# Patient Record
Sex: MALE | Race: WHITE | NOT HISPANIC OR LATINO | Employment: OTHER | URBAN - METROPOLITAN AREA
[De-identification: names, ages, dates, MRNs, and addresses within clinical notes are randomized per-mention and may not be internally consistent; named-entity substitution may affect disease eponyms.]

---

## 2017-01-24 ENCOUNTER — OFFICE VISIT (OUTPATIENT)
Dept: ORTHOPEDICS | Facility: CLINIC | Age: 82
End: 2017-01-24
Payer: COMMERCIAL

## 2017-01-24 ENCOUNTER — RADIANT APPOINTMENT (OUTPATIENT)
Dept: GENERAL RADIOLOGY | Facility: CLINIC | Age: 82
End: 2017-01-24
Attending: ORTHOPAEDIC SURGERY
Payer: COMMERCIAL

## 2017-01-24 VITALS — HEIGHT: 68 IN | WEIGHT: 170 LBS | BODY MASS INDEX: 25.76 KG/M2 | RESPIRATION RATE: 18 BRPM

## 2017-01-24 DIAGNOSIS — M17.0 OSTEOARTHRITIS OF BOTH KNEES, UNSPECIFIED OSTEOARTHRITIS TYPE: Primary | ICD-10-CM

## 2017-01-24 DIAGNOSIS — M17.0 PRIMARY OSTEOARTHRITIS OF BOTH KNEES: ICD-10-CM

## 2017-01-24 DIAGNOSIS — M17.0 OSTEOARTHRITIS OF BOTH KNEES, UNSPECIFIED OSTEOARTHRITIS TYPE: ICD-10-CM

## 2017-01-24 PROCEDURE — 20610 DRAIN/INJ JOINT/BURSA W/O US: CPT | Mod: 50 | Performed by: ORTHOPAEDIC SURGERY

## 2017-01-24 PROCEDURE — 73562 X-RAY EXAM OF KNEE 3: CPT | Mod: RT

## 2017-01-24 PROCEDURE — 99203 OFFICE O/P NEW LOW 30 MIN: CPT | Mod: 25 | Performed by: ORTHOPAEDIC SURGERY

## 2017-01-24 NOTE — PROGRESS NOTES
The patient's left and right knee was prepped with betadine solution after verification of allergies. Area approximately 10 cm x 10 cm prepped in a sterile fashion. After injection, betadine removed with soap and water and band-aids applied.    1ml depo-medrol with 1% lidocaine plain injected into patient's left and right knee by Dr. Juliano Childers  LOT# [id number]  Exp. 05/2019

## 2017-01-24 NOTE — PATIENT INSTRUCTIONS
You have had a steroid injection today.  For the first 2 hours there will likely be some numbing in the joint from the lidocaine.  This is a good sign, indicating that the injection is in the right place.  In 2 hours the lidocaine will wear off, and the joint will hurt like you had a shot.  Each day the cortisone makes it feel better.  It reaches peak effect in 2 weeks.  We expect it to last for 3 months.  You may resume regular activity when you feel ready.  If you are diabetic, your glucoses will be quite high for several days.

## 2017-01-25 RX ORDER — METHYLPREDNISOLONE ACETATE 80 MG/ML
160 INJECTION, SUSPENSION INTRA-ARTICULAR; INTRALESIONAL; INTRAMUSCULAR; SOFT TISSUE ONCE
Qty: 2 ML | Refills: 0 | OUTPATIENT
Start: 2017-01-25 | End: 2017-01-25

## 2017-01-26 NOTE — PROGRESS NOTES
Bambi Feldman is a 89 year old male who is seen as self referral  for bilateral knee pain.    Past Medical History   Diagnosis Date     Elevated cholesterol      HTN (hypertension)      Other malignant neoplasm of skin, site unspecified      Non-melanoma skin cancer     BPH (benign prostatic hyperplasia)        Past Surgical History   Procedure Laterality Date     Surgical history of -        skin cancer right neck and lip     Suprapubic prostatectomy         Family History   Problem Relation Age of Onset     C.A.D. No family hx of      DIABETES No family hx of      Hypertension No family hx of      CEREBROVASCULAR DISEASE No family hx of      Breast Cancer No family hx of      Cancer - colorectal No family hx of      Prostate Cancer No family hx of      Alzheimer Disease Sister        Social History     Social History     Marital Status: Single     Spouse Name: N/A     Number of Children: N/A     Years of Education: N/A     Occupational History     Not on file.     Social History Main Topics     Smoking status: Former Smoker     Quit date: 01/20/1970     Smokeless tobacco: Never Used     Alcohol Use: Yes      Comment: 1 drink per night      Drug Use: No     Sexual Activity: Not Currently     Other Topics Concern     Parent/Sibling W/ Cabg, Mi Or Angioplasty Before 65f 55m? No     Social History Narrative       Current Outpatient Prescriptions   Medication Sig Dispense Refill     methylPREDNISolone acetate (DEPO-MEDROL) 80 MG/ML injection 2 mLs (160 mg) by INTRA-ARTICULAR route once for 1 dose 2 mL 0     cyanocolbalamin (VITAMIN  B-12) 500 MCG tablet Take 500 mcg by mouth daily       UNABLE TO FIND daily MEDICATION NAME: Super Vitamin B       REINALDO SEED PO Take 1 teaspoonful by mouth daily       Ginger Root POWD Take 1 Dose by mouth daily       naproxen sodium (ALEVE) 220 MG tablet Take 220 mg by mouth nightly as needed for moderate pain       pravastatin (PRAVACHOL) 10 MG tablet Take 1 tablet (10 mg) by mouth daily  "90 tablet 1     lisinopril (PRINIVIL,ZESTRIL) 10 MG tablet Take 1 tablet (10 mg) by mouth daily 90 tablet 1     Red Yeast Rice Extract (RED YEAST RICE PO)        Magnesium 100 MG CAPS Take  by mouth.       Garlic 2000 MG CAPS Take  by mouth.       co-enzyme Q-10 (COQ10) 100 MG CAPS Take  by mouth daily.       cholecalciferol (VITAMIN D3) 19255 UNIT capsule Take 1 capsule by mouth daily.         Allergies   Allergen Reactions     Nkda [No Known Drug Allergies]        REVIEW OF SYSTEMS:  CONSTITUTIONAL:  NEGATIVE for fever, chills, change in weight, not feeling tired  SKIN:  NEGATIVE for worrisome rashes, no skin lumps, no skin ulcers and no non-healing wounds  EYES:  NEGATIVE for vision changes or irritation.  ENT/MOUTH:  NEGATIVE.  No hearing loss, no hoarseness, no difficulty swallowing.  RESP:  NEGATIVE. No cough or shortness of breath.  CV:  NEGATIVE for chest pain, palpitations or peripheral edema  GI:  NEGATIVE for nausea, abdominal pain, heartburn, or change in bowel habits  :  Negative. No dysuria, no hematuria  MUSCULOSKELETAL:  See HPI above  NEURO:  NEGATIVE . No headaches, no dizziness,  no numbness  ENDOCRINE:  NEGATIVE for temperature intolerance, skin/hair changes  HEME/ALLERGY/IMMUNE:  NEGATIVE for bleeding problems  PSYCHIATRIC:  NEGATIVE. no anxiety, no depression.      Exam:  Vitals: Resp 18  Ht 1.721 m (5' 7.75\")  Wt 77.111 kg (170 lb)  BMI 26.03 kg/m2  BMI= Body mass index is 26.03 kg/(m^2).  Constitutional:  healthy, alert and no distress  Neuro: Alert and Oriented x 3, Gait normal. Sensation grossly WNL.  HEENT:  Atraumatic, EOMI  Neck:  Neck supple with no tenderness.  Psych: Affect normal   Respiratory: Breathing not labored.  Cardiovascular: normal peripheral pulses  Lymph: no adenopathy  Skin: No rashes,worrisome lesions or skin problems  Spine: straight, no straight leg raising pain.  Hips show full range of motion.  There is no tenderness over the sacro-iliac joints, sciatic notch, " or greater trochanters.

## 2017-01-26 NOTE — PROGRESS NOTES
DATE OF VISIT:  2017      HISTORY OF PRESENT ILLNESS:  Mr. Bambi Feldman is an 89-year-old male seen for evaluation of bilateral knee pain.  This has been present for almost the last year, not sure what caused it.  He has had pain sleeping, walking, going up and down stairs, any kind of kneeling or bending.  He is on aspirin and Tylenol help.  He has aching constant pain rated 7/10.  He has had shots in the knees previously and they do seem to help.      IMAGING:  X-rays of both knees were performed today showing severe osteoarthritis with medial joint, bone on bone on the left and almost bone on bone on the right.      PHYSICAL EXAMINATION:  Shows motion from 0-130 degrees on both knees.  He has medial joint tenderness on both knees.  He has mild pseudolaxity of the MCL.  No laxity of LCL or cruciates.  He has mild effusion of both knees and does have patellofemoral crepitation of both knees.  Sensation and circulation are intact.      IMPRESSION:  Bilateral knee osteoarthritis, left slightly greater than right.  His right is actually more painful.  We discussed options and will perform steroid injection of each knee today with 80 mg Depo-Medrol and lidocaine.  We have discussed possible use of anti-inflammatories, glucosamine chondroitin sulfate or possible knee replacement.  He is on Aleve right now and getting some relief with that, so we will continue that for him.         TERESA GOODWIN MD             D: 2017 21:39   T: 2017 22:57   MT:       Name:     BAMBI FELDMAN   MRN:      [id number]        Account:      [id number]   :      1927           Visit Date:   2017      Document: [id number]

## 2017-02-06 ENCOUNTER — OFFICE VISIT (OUTPATIENT)
Dept: ORTHOPEDICS | Facility: CLINIC | Age: 82
End: 2017-02-06
Payer: COMMERCIAL

## 2017-02-06 VITALS — HEIGHT: 68 IN | RESPIRATION RATE: 15 BRPM | BODY MASS INDEX: 25.61 KG/M2 | WEIGHT: 169 LBS

## 2017-02-06 DIAGNOSIS — M70.61 TROCHANTERIC BURSITIS OF RIGHT HIP: Primary | ICD-10-CM

## 2017-02-06 PROCEDURE — 99213 OFFICE O/P EST LOW 20 MIN: CPT | Performed by: ORTHOPAEDIC SURGERY

## 2017-02-06 NOTE — MR AVS SNAPSHOT
After Visit Summary   2/6/2017    Bambi Feldman    MRN: [id number]           Patient Information     Date Of Birth          12/23/1927        Visit Information        Provider Department      2/6/2017 1:15 PM Juliano Childers MD [facility] Instructions                     Trochanteric Bursitis           What is trochanteric bursitis?   Trochanteric bursitis is irritation or inflammation of the trochanteric bursa. A bursa is a fluid-filled sac that acts as a cushion between tendons, bones, and skin. The trochanteric bursa is located on the upper, outer area of the thigh. There is a bump on the outer side of the upper part of the thigh bone (femur) called the greater trochanter. The trochanteric bursa is located over the greater trochanter.   How does it occur?   The trochanteric bursa may be inflamed by a group of muscles or tendons rubbing over the bursa and causing friction against the thigh bone. This injury can occur with running, walking, or bicycling, especially when the bicycle seat is too high.   What are the symptoms?   You have pain on the upper outer area of your thigh or in your hip. The pain is worse when you walk, bicycle, or go up or down stairs. You have pain when you move your thigh bone and feel tenderness in the area over the greater trochanter.   How is it diagnosed?   Your healthcare provider will ask about your symptoms and examine your hip and thigh.   How is it treated?   To treat this condition:   Put an ice pack, gel pack, or package of frozen vegetables, wrapped in a cloth on the area every 3 to 4 hours, for up to 20 minutes at a time.   Sleep with a pillow between your legs.  Take an anti-inflammatory medicine such as ibuprofen, or other medicine as directed by your provider. Nonsteroidal anti-inflammatory medicines (NSAIDs) may cause stomach bleeding and other problems. These risks increase with age. Read the label and take as directed. Unless  recommended by your healthcare provider, do not take for more than 10 days.   Your provider may give you an injection of a corticosteroid medicine.   While you are recovering from your injury you will need to change your sport or activity to one that does not make your condition worse. For example, you may need to swim instead of running or bicycling. If you are bicycling, you may need to lower your bicycle seat.   How long do the effects last?   The length of recovery depends on many factors such as your age, health, and if you have had a previous injury. Recovery time also depends on the severity of the injury. A bursa that is only mildly inflamed and has just started to hurt may improve within a few weeks. A bursa that is significantly inflamed and has been painful for a long time may take up to a few months to improve. You need to stop doing the activities that cause pain until your bursa has healed. If you continue doing activities that cause pain, your symptoms will return and it will take longer to recover.   When can I return to my normal activities?   Everyone recovers from an injury at a different rate. Return to your activities depends on how soon your leg recovers, not by how many days or weeks it has been since your injury has occurred. In general, the longer you have symptoms before you start treatment, the longer it will take to get better. The goal of rehabilitation is to return to your normal activities as soon as is safely possible. If you return too soon you may worsen your injury.   You may safely return to your normal activities when, starting from the top of the list and progressing to the end, each of the following is true:   You have full range of motion in the injured leg compared to the uninjured leg.   You have full strength of the injured leg compared to the uninjured leg.   You can walk straight ahead without pain or limping.   How can I prevent trochanteric bursitis?   Trochanteric  bursitis is best prevented by warming up properly and stretching the muscles on the outer side of your upper thigh.     Published by [employer].  This content is reviewed periodically and is subject to change as new health information becomes available. The information is intended to inform and educate and is not a replacement for medical evaluation, advice, diagnosis or treatment by a healthcare professional.   Written by Eitan Cedeno MD, for [employer].   ? 2010 [facility] and/or its affiliates. All Rights Reserved.   Copyright   [employer] 2011                               Trochanteric Bursitis Rehabilitation Exercises   You can begin stretching the muscles that run along the outside of your hip using the first exercise. You can do the strengthening exercises when the sharp pain lessens.                    Strengthening exercises:  Start abductor strengthening and begin the exercises demonstrated today.  Leg strengthening:  Hold onto the sink with painful leg toward the sink.  Lift painful leg out to touch the wall with the heel.  Lift 10-15 times, twice a day.                                              Follow-ups after your visit        Who to contact     If you have questions or need follow up information about today's clinic visit or your schedule please contact [facility] directly at [phone].  Normal or non-critical lab and imaging results will be communicated to you by MyChart, letter or phone within 4 business days after the clinic has received the results. If you do not hear from us within 7 days, please contact the clinic through MyChart or phone. If you have a critical or abnormal lab result, we will notify you by phone as soon as possible.  Submit refill requests through [employer] or call your pharmacy and they will forward the refill request to us. Please allow 3 business days for your refill to be completed.          Additional Information About Your Visit    "     MyChart Information     [employer] lets you send messages to your doctor, view your test results, renew your prescriptions, schedule appointments and more. To sign up, go to [email or web address] . Click on \"Log in\" on the left side of the screen, which will take you to the Welcome page. Then click on \"Sign up Now\" on the right side of the page.     You will be asked to enter the access code listed below, as well as some personal information. Please follow the directions to create your username and password.     Your access code is: [email or web address]  Expires: 3/1/2017 12:20 PM     Your access code will  in 90 days. If you need help or a new code, please call your [place] clinic or [phone].        Care EveryWhere ID     This is your Care EveryWhere ID. This could be used by other organizations to access your [facility] medical records  [id number]        Your Vitals Were     Respirations Height BMI (Body Mass Index)             15 1.727 m (5' 8\") 25.70 kg/m2          Blood Pressure from Last 3 Encounters:   16 121/62   16 132/76   03/10/16 128/64    Weight from Last 3 Encounters:   17 76.658 kg (169 lb)   17 77.111 kg (170 lb)   16 76.658 kg (169 lb)              Today, you had the following     No orders found for display       Primary Care Provider Office Phone # Fax #    Chandrika Stonemerlin  [phone]       [address] [address]        Thank you!     Thank you for choosing [facility]  for your care. Our goal is always to provide you with excellent care. Hearing back from our patients is one way we can continue to improve our services. Please take a few minutes to complete the written survey that you may receive in the mail after your visit with us. Thank you!             Your Updated Medication List - Protect others around you: Learn how to safely use, store and throw away your medicines at " www.disposemymeds.org.          This list is accurate as of: 2/6/17  1:44 PM.  Always use your most recent med list.                   Brand Name Dispense Instructions for use    ALEVE 220 MG tablet   Generic drug:  naproxen sodium      Take 220 mg by mouth nightly as needed for moderate pain       REINALDO SEED PO      Take 1 teaspoonful by mouth daily       cholecalciferol 02969 UNITS capsule    vitamin D3     Take 1 capsule by mouth daily.       co-enzyme Q-10 100 MG Caps capsule      Take  by mouth daily.       cyanocolbalamin 500 MCG tablet    vitamin  B-12     Take 500 mcg by mouth daily       Garlic 2000 MG Caps      Take  by mouth.       Ginger Root Powd      Take 1 Dose by mouth daily       lisinopril 10 MG tablet    PRINIVIL/ZESTRIL    90 tablet    Take 1 tablet (10 mg) by mouth daily       magnesium 100 MG Caps      Take  by mouth.       pravastatin 10 MG tablet    PRAVACHOL    90 tablet    Take 1 tablet (10 mg) by mouth daily       RED YEAST RICE PO          UNABLE TO FIND      daily MEDICATION NAME: Super Vitamin B

## 2017-02-06 NOTE — PATIENT INSTRUCTIONS
Trochanteric Bursitis           What is trochanteric bursitis?   Trochanteric bursitis is irritation or inflammation of the trochanteric bursa. A bursa is a fluid-filled sac that acts as a cushion between tendons, bones, and skin. The trochanteric bursa is located on the upper, outer area of the thigh. There is a bump on the outer side of the upper part of the thigh bone (femur) called the greater trochanter. The trochanteric bursa is located over the greater trochanter.   How does it occur?   The trochanteric bursa may be inflamed by a group of muscles or tendons rubbing over the bursa and causing friction against the thigh bone. This injury can occur with running, walking, or bicycling, especially when the bicycle seat is too high.   What are the symptoms?   You have pain on the upper outer area of your thigh or in your hip. The pain is worse when you walk, bicycle, or go up or down stairs. You have pain when you move your thigh bone and feel tenderness in the area over the greater trochanter.   How is it diagnosed?   Your healthcare provider will ask about your symptoms and examine your hip and thigh.   How is it treated?   To treat this condition:   Put an ice pack, gel pack, or package of frozen vegetables, wrapped in a cloth on the area every 3 to 4 hours, for up to 20 minutes at a time.   Sleep with a pillow between your legs.  Take an anti-inflammatory medicine such as ibuprofen, or other medicine as directed by your provider. Nonsteroidal anti-inflammatory medicines (NSAIDs) may cause stomach bleeding and other problems. These risks increase with age. Read the label and take as directed. Unless recommended by your healthcare provider, do not take for more than 10 days.   Your provider may give you an injection of a corticosteroid medicine.   While you are recovering from your injury you will need to change your sport or activity to one that does not make your condition worse. For example,  you may need to swim instead of running or bicycling. If you are bicycling, you may need to lower your bicycle seat.   How long do the effects last?   The length of recovery depends on many factors such as your age, health, and if you have had a previous injury. Recovery time also depends on the severity of the injury. A bursa that is only mildly inflamed and has just started to hurt may improve within a few weeks. A bursa that is significantly inflamed and has been painful for a long time may take up to a few months to improve. You need to stop doing the activities that cause pain until your bursa has healed. If you continue doing activities that cause pain, your symptoms will return and it will take longer to recover.   When can I return to my normal activities?   Everyone recovers from an injury at a different rate. Return to your activities depends on how soon your leg recovers, not by how many days or weeks it has been since your injury has occurred. In general, the longer you have symptoms before you start treatment, the longer it will take to get better. The goal of rehabilitation is to return to your normal activities as soon as is safely possible. If you return too soon you may worsen your injury.   You may safely return to your normal activities when, starting from the top of the list and progressing to the end, each of the following is true:   You have full range of motion in the injured leg compared to the uninjured leg.   You have full strength of the injured leg compared to the uninjured leg.   You can walk straight ahead without pain or limping.   How can I prevent trochanteric bursitis?   Trochanteric bursitis is best prevented by warming up properly and stretching the muscles on the outer side of your upper thigh.     Published by [employer].  This content is reviewed periodically and is subject to change as new health information becomes available. The information is intended to inform and educate  and is not a replacement for medical evaluation, advice, diagnosis or treatment by a healthcare professional.   Written by Eitan Cedeno MD, for [employer].   ? 2010 [employer] and/or its affiliates. All Rights Reserved.   Copyright   [employer] 2011                               Trochanteric Bursitis Rehabilitation Exercises   You can begin stretching the muscles that run along the outside of your hip using the first exercise. You can do the strengthening exercises when the sharp pain lessens.                    Strengthening exercises:  Start abductor strengthening and begin the exercises demonstrated today.  Leg strengthening:  Hold onto the sink with painful leg toward the sink.  Lift painful leg out to touch the wall with the heel.  Lift 10-15 times, twice a day.

## 2017-02-06 NOTE — NURSING NOTE
"Chief Complaint   Patient presents with     Consult     Right hip pain. Last seen for bilateral knee osteoarthritis 1/24/17       Initial Resp 15  Ht 1.727 m (5' 8\")  Wt 76.658 kg (169 lb)  BMI 25.70 kg/m2 Estimated body mass index is 25.7 kg/(m^2) as calculated from the following:    Height as of this encounter: 1.727 m (5' 8\").    Weight as of this encounter: 76.658 kg (169 lb).  Medication Reconciliation: complete     Godfrey Pinon PA-C  Supervising physician: Juliano Childers MD  Dept. of Orthopedics  [facility]          "

## 2017-02-07 NOTE — PROGRESS NOTES
Bambi Feldman is a 89 year old male who is seen as self referral for right hip pain.    Past Medical History   Diagnosis Date     Elevated cholesterol      HTN (hypertension)      Other malignant neoplasm of skin, site unspecified      Non-melanoma skin cancer     BPH (benign prostatic hyperplasia)        Past Surgical History   Procedure Laterality Date     Surgical history of -        skin cancer right neck and lip     Suprapubic prostatectomy         Family History   Problem Relation Age of Onset     C.A.D. No family hx of      DIABETES No family hx of      Hypertension No family hx of      CEREBROVASCULAR DISEASE No family hx of      Breast Cancer No family hx of      Cancer - colorectal No family hx of      Prostate Cancer No family hx of      Alzheimer Disease Sister        Social History     Social History     Marital Status: Single     Spouse Name: N/A     Number of Children: N/A     Years of Education: N/A     Occupational History     Not on file.     Social History Main Topics     Smoking status: Former Smoker     Quit date: 01/20/1970     Smokeless tobacco: Never Used     Alcohol Use: Yes      Comment: 1 drink per night      Drug Use: No     Sexual Activity: Not Currently     Other Topics Concern     Parent/Sibling W/ Cabg, Mi Or Angioplasty Before 65f 55m? No     Social History Narrative       Current Outpatient Prescriptions   Medication Sig Dispense Refill     cyanocolbalamin (VITAMIN  B-12) 500 MCG tablet Take 500 mcg by mouth daily       UNABLE TO FIND daily MEDICATION NAME: Super Vitamin B       REINALDO SEED PO Take 1 teaspoonful by mouth daily       Ginger Root POWD Take 1 Dose by mouth daily       naproxen sodium (ALEVE) 220 MG tablet Take 220 mg by mouth nightly as needed for moderate pain       pravastatin (PRAVACHOL) 10 MG tablet Take 1 tablet (10 mg) by mouth daily 90 tablet 1     lisinopril (PRINIVIL,ZESTRIL) 10 MG tablet Take 1 tablet (10 mg) by mouth daily 90 tablet 1     Red Yeast Rice Extract  "(RED YEAST RICE PO)        Magnesium 100 MG CAPS Take  by mouth.       Garlic 2000 MG CAPS Take  by mouth.       co-enzyme Q-10 (COQ10) 100 MG CAPS Take  by mouth daily.       cholecalciferol (VITAMIN D3) 48320 UNIT capsule Take 1 capsule by mouth daily.         Allergies   Allergen Reactions     Nkda [No Known Drug Allergies]        REVIEW OF SYSTEMS:  CONSTITUTIONAL:  NEGATIVE for fever, chills, change in weight, not feeling tired  SKIN:  NEGATIVE for worrisome rashes, no skin lumps, no skin ulcers and no non-healing wounds  EYES:  NEGATIVE for vision changes or irritation.  ENT/MOUTH:  NEGATIVE.  No hearing loss, no hoarseness, no difficulty swallowing.  RESP:  NEGATIVE. No cough or shortness of breath.  BREAST:  NEGATIVE for masses, tenderness or discharge  CV:  NEGATIVE for chest pain, palpitations or peripheral edema  GI:  NEGATIVE for nausea, abdominal pain, heartburn, or change in bowel habits  :  Negative. No dysuria, no hematuria  MUSCULOSKELETAL:  See HPI above  NEURO:  NEGATIVE . No headaches, no dizziness,  no numbness  ENDOCRINE:  NEGATIVE for temperature intolerance, skin/hair changes  HEME/ALLERGY/IMMUNE:  NEGATIVE for bleeding problems  PSYCHIATRIC:  NEGATIVE. no anxiety, no depression.     Exam:  Vitals: Resp 15  Ht 1.727 m (5' 8\")  Wt 76.658 kg (169 lb)  BMI 25.70 kg/m2  BMI= Body mass index is 25.7 kg/(m^2).  Constitutional:  healthy, alert and no distress  Neuro: Alert and Oriented x 3, Gait normal. Sensation grossly WNL.  Psych: Affect normal   Respiratory: Breathing not labored.  Cardiovascular: normal peripheral pulses  Lymph: no adenopathy  Skin: No rashes,worrisome lesions or skin problems    "

## 2017-02-07 NOTE — PROGRESS NOTES
DATE OF SERVICE:  2017      HISTORY OF PRESENT ILLNESS:  Mr. Bambi Feldman is an 89-year-old male seen with right hip pain.  He has pain on the right hip with sleeping, and cannot lie on that side.  Even on the left side the right hip hurts.  He often wakes up in the middle of the night with pain.  Gets up and takes Aleve PM and it seems to help.  He has had no other specific treatment.  This started about 2016.  He does not have x-ray of the hips.  We have seen him just recently for knee osteoarthritis and performed steroid injection of that.      PHYSICAL EXAMINATION:  Good mobility of the hips with external rotation to 50 degrees, internal rotation to 30 degrees without pain.  He has tenderness over the right greater trochanter.  There is mild tenderness near the sacroiliac joints bilaterally.  He has good flexion, extension of the hips.  He does have some increased pain with adduction across his body on the right, negative on the left.  He had negative straight leg raise to 90 degrees bilaterally.  Sensation, motor and circulation are intact.      IMPRESSION:  Right hip trochanteric bursitis.  We discussed options.  Will have him use a pillow between his knees when he sleeps.  He should tilt slightly forward or backward, sleeping on the right side.  We have gone over hip abduction strengthening and IT band stretching.  If this fails, consider steroid injection.         TERESA GOODWIN MD             D: 2017 20:01   T: 2017 04:44   MT: [email or web address]      Name:     BAMBI FELDMAN   MRN:      [id number]        Account:      [id number]   :      1927           Visit Date:   2017      Document: [id number]

## 2017-04-27 ENCOUNTER — TELEPHONE (OUTPATIENT)
Dept: FAMILY MEDICINE | Facility: CLINIC | Age: 82
End: 2017-04-27

## 2017-04-27 NOTE — TELEPHONE ENCOUNTER
TC left a message for patient to return call to clinic to clarify appointment that is scheduled on 05/01 with Dr Linda for cortisone injections. Patient has seen Dr Childers in the past and has never seen Dr Linda. Dr Linda can see patient if needed but need to clarify if this appointment was intended to be scheduled with Dr Childers and not Dr Linda.

## 2017-05-02 ENCOUNTER — OFFICE VISIT (OUTPATIENT)
Dept: ORTHOPEDICS | Facility: CLINIC | Age: 82
End: 2017-05-02
Payer: COMMERCIAL

## 2017-05-02 VITALS — TEMPERATURE: 97.8 F | WEIGHT: 170 LBS | HEIGHT: 68 IN | BODY MASS INDEX: 25.76 KG/M2 | RESPIRATION RATE: 18 BRPM

## 2017-05-02 DIAGNOSIS — M70.61 TROCHANTERIC BURSITIS OF RIGHT HIP: Primary | ICD-10-CM

## 2017-05-02 PROCEDURE — 20610 DRAIN/INJ JOINT/BURSA W/O US: CPT | Mod: RT | Performed by: ORTHOPAEDIC SURGERY

## 2017-05-02 RX ORDER — TRIAMCINOLONE ACETONIDE 40 MG/ML
40 INJECTION, SUSPENSION INTRA-ARTICULAR; INTRAMUSCULAR ONCE
Qty: 1 ML | Refills: 0 | OUTPATIENT
Start: 2017-05-02 | End: 2017-05-02

## 2017-05-02 NOTE — PROGRESS NOTES
HISTORY OF PRESENT ILLNESS:  Mr. Bambi Feldman is an 89-year-old male seen with right hip pain.  He has pain on the right hip with sleeping, and cannot lie on that side.  Even on the left side the right hip hurts.  He often wakes up in the middle of the night with pain.  Gets up and takes Aleve PM and it seems to help.  We did give him stretching and strengthening exercises. Pain started about 11/01/2016.  He does not have x-ray of the hips.      PHYSICAL EXAMINATION:  Good mobility of the hips with external rotation to 50 degrees, internal rotation to 30 degrees without pain.  He has tenderness over the right greater trochanter.  There is mild tenderness near the sacroiliac joints bilaterally.  He has good flexion, extension of the hips.  He does have some increased pain with adduction across his body on the right, negative on the left.  He had negative straight leg raise to 90 degrees bilaterally.  Sensation, motor and circulation are intact.      IMPRESSION:  Right hip trochanteric bursitis.  We discussed options.    He  desires injection today of right greater trochanteric bursitis.  Risks, benefits, potential complications and alternatives were discussed.  With the patient's consent, we injected the right greater trochanteric bursitis  with Kenalog 40 mg and lidocaine  after sterile prep.          TERESA GOODWIN MD

## 2017-05-02 NOTE — MR AVS SNAPSHOT
"              After Visit Summary   2017    Bambi Feldman    MRN: [id number]           Patient Information     Date Of Birth          1927        Visit Information        Provider Department      2017 2:00 PM Juliano Childers MD [facility]        Today's Diagnoses     Trochanteric bursitis of right hip    -  1       Follow-ups after your visit        Who to contact     If you have questions or need follow up information about today's clinic visit or your schedule please contact [facility] directly at [phone].  Normal or non-critical lab and imaging results will be communicated to you by [facility], letter or phone within 4 business days after the clinic has received the results. If you do not hear from us within 7 days, please contact the clinic through [email or web address] or phone. If you have a critical or abnormal lab result, we will notify you by phone as soon as possible.  Submit refill requests through [facility] or call your pharmacy and they will forward the refill request to us. Please allow 3 business days for your refill to be completed.          Additional Information About Your Visit        MyChart Information     [facility] lets you send messages to your doctor, view your test results, renew your prescriptions, schedule appointments and more. To sign up, go to [email or web address] . Click on \"Log in\" on the left side of the screen, which will take you to the Welcome page. Then click on \"Sign up Now\" on the right side of the page.     You will be asked to enter the access code listed below, as well as some personal information. Please follow the directions to create your username and password.     Your access code is: [email or web address]  Expires: 2017  2:25 PM     Your access code will  in 90 days. If you need help or a new code, please call your [facility] or [phone].        Care EveryWhere ID     This is your Care EveryWhere ID. This " "could be used by other organizations to access your [facility] medical records  [id number]        Your Vitals Were     Temperature Respirations Height BMI (Body Mass Index)          97.8  F (36.6  C) 18 1.721 m (5' 7.75\") 26.04 kg/m2         Blood Pressure from Last 3 Encounters:   12/01/16 121/62   08/18/16 132/76   03/10/16 128/64    Weight from Last 3 Encounters:   05/02/17 77.1 kg (170 lb)   02/06/17 76.7 kg (169 lb)   01/24/17 77.1 kg (170 lb)              We Performed the Following     DRAIN/INJECT LARGE JOINT/BURSA     TRIAMCINOLONE ACET INJ NOS          Today's Medication Changes          These changes are accurate as of: 5/2/17  2:25 PM.  If you have any questions, ask your nurse or doctor.               Start taking these medicines.        Dose/Directions    triamcinolone acetonide 40 MG/ML injection   Commonly known as:  KENALOG   Used for:  Trochanteric bursitis of right hip   Started by:  Juliano Childers MD        Dose:  40 mg   1 mL (40 mg) by INTRA-ARTICULAR route once for 1 dose   Quantity:  1 mL   Refills:  0            Where to get your medicines      Some of these will need a paper prescription and others can be bought over the counter.  Ask your nurse if you have questions.     You don't need a prescription for these medications     triamcinolone acetonide 40 MG/ML injection                Primary Care Provider Office Phone # Fax #    Chandrika Cordova [phone]       [address] [address]        Thank you!     Thank you for choosing [facility]  for your care. Our goal is always to provide you with excellent care. Hearing back from our patients is one way we can continue to improve our services. Please take a few minutes to complete the written survey that you may receive in the mail after your visit with us. Thank you!             Your Updated Medication List - Protect others around you: Learn how to " safely use, store and throw away your medicines at www.disposemymeds.org.          This list is accurate as of: 5/2/17  2:25 PM.  Always use your most recent med list.                   Brand Name Dispense Instructions for use    ALEVE 220 MG tablet   Generic drug:  naproxen sodium      Take 220 mg by mouth nightly as needed for moderate pain       REINALDO SEED PO      Take 1 teaspoonful by mouth daily       cholecalciferol 74212 UNITS capsule    vitamin D3     Take 1 capsule by mouth daily.       co-enzyme Q-10 100 MG Caps capsule      Take  by mouth daily.       cyanocolbalamin 500 MCG tablet    vitamin  B-12     Take 500 mcg by mouth daily       Garlic 2000 MG Caps      Take  by mouth.       Ginger Root Powd      Take 1 Dose by mouth daily       lisinopril 10 MG tablet    PRINIVIL/ZESTRIL    90 tablet    Take 1 tablet (10 mg) by mouth daily       magnesium 100 MG Caps      Take  by mouth.       pravastatin 10 MG tablet    PRAVACHOL    90 tablet    Take 1 tablet (10 mg) by mouth daily       RED YEAST RICE PO          triamcinolone acetonide 40 MG/ML injection    KENALOG    1 mL    1 mL (40 mg) by INTRA-ARTICULAR route once for 1 dose       UNABLE TO FIND      daily MEDICATION NAME: Super Vitamin B

## 2017-05-02 NOTE — PROGRESS NOTES
The patient's right hip was prepped with betadine solution after verification of allergies. Area approximately 10 cm x 10 cm prepped in a sterile fashion. After injection, betadine removed with soap and water and band-aids applied.    1ml kenalog with 1% lidocaine plain injected into patient's right hip by Dr. Juliano Childers  LOT# [id number]  Exp. 06/2018    Godfrey Pinon PA-C  Supervising physician: Juliano Childers MD  Dept. of Orthopedics  [facility]

## 2017-05-02 NOTE — NURSING NOTE
"Chief Complaint   Patient presents with     RECHECK     Right hip trochanteric bursitis and bilateral knee OA last injection 1/24/17.       Initial Temp 97.8  F (36.6  C)  Resp 18  Ht 1.721 m (5' 7.75\")  Wt 77.1 kg (170 lb)  BMI 26.04 kg/m2 Estimated body mass index is 26.04 kg/(m^2) as calculated from the following:    Height as of this encounter: 1.721 m (5' 7.75\").    Weight as of this encounter: 77.1 kg (170 lb).  Medication Reconciliation: complete   Sarah William MA      "

## 2017-05-02 NOTE — LETTER
5/2/2017       RE: Bambi Feldman  [address] [place] [address]           Dear Colleague,    Thank you for referring your patient, Bambi Feldman, to the [facility]. Please see a copy of my visit note below.         HISTORY OF PRESENT ILLNESS:  Mr. Bambi Feldman is an 89-year-old male seen with right hip pain.  He has pain on the right hip with sleeping, and cannot lie on that side.  Even on the left side the right hip hurts.  He often wakes up in the middle of the night with pain.  Gets up and takes Aleve PM and it seems to help.  We did give him stretching and strengthening exercises. Pain started about 11/01/2016.  He does not have x-ray of the hips.      PHYSICAL EXAMINATION:  Good mobility of the hips with external rotation to 50 degrees, internal rotation to 30 degrees without pain.  He has tenderness over the right greater trochanter.  There is mild tenderness near the sacroiliac joints bilaterally.  He has good flexion, extension of the hips.  He does have some increased pain with adduction across his body on the right, negative on the left.  He had negative straight leg raise to 90 degrees bilaterally.  Sensation, motor and circulation are intact.      IMPRESSION:  Right hip trochanteric bursitis.  We discussed options.    He  desires injection today of right greater trochanteric bursitis.  Risks, benefits, potential complications and alternatives were discussed.  With the patient's consent, we injected the right greater trochanteric bursitis  with Kenalog 40 mg and lidocaine  after sterile prep.          JULIANO GOODWIN MD              The patient's right hip was prepped with betadine solution after verification of allergies. Area approximately 10 cm x 10 cm prepped in a sterile fashion. After injection, betadine removed with soap and water and band-aids applied.    1ml kenalog with 1% lidocaine plain injected into patient's right hip by Dr. Juliano Goodwin  LOT#  [id number]  Exp. 06/2018    Godfrey Pinon PA-C  Supervising physician: Juliano Childers MD  Dept. of Orthopedics  [facility]          Again, thank you for allowing me to participate in the care of your patient.        Sincerely,              Juliano Childers MD

## 2017-05-05 ENCOUNTER — TELEPHONE (OUTPATIENT)
Dept: ORTHOPEDICS | Facility: CLINIC | Age: 82
End: 2017-05-05

## 2017-05-05 NOTE — TELEPHONE ENCOUNTER
Reason for Call:  Other Prior Auth    Detailed comments: Patient contacted [facility] regarding Synvisc injection.  Insurance will cover but prior auth is needed.  Please call patient to advise when this is in place so patient can schedule appointment.    Phone Number Patient can be reached at: Other phone number:  [phone]    Best Time: Any    Can we leave a detailed message on this number? YES    Call taken on 5/5/2017 at 1:30 PM by Jose Olsen

## 2017-05-08 NOTE — TELEPHONE ENCOUNTER
Reason for Call:  Other Prior Auth    Detailed comments: Marya with [employer] called after speaking to patient.  Prior Auth form can be downloaded and printed from [employer]; main page scroll to provider portal and at top go to Admin tool; click on forms for providers; medical policy and scroll to prior auth and fax number is on top of form. Medication should be covered but prior auth is needed.    Phone Number Patient can be reached at: Other phone number:  [phone] to reach Marya    Best Time: Any    Can we leave a detailed message on this number? YES    Call taken on 5/8/2017 at 2:00 PM by Jose Olsen

## 2017-05-08 NOTE — TELEPHONE ENCOUNTER
Spoke with Bambi regarding his request. I let him know to reach out to his insurance company to see what is needed for the prior authorization and to ensure that Synvisc is the proper medication that will be covered. I gave him our fax number for to give to his insurance company. He had no further questions at this time.    Godfrey Pinon PA-C  Supervising physician: Juliano Childers MD  Dept. of Orthopedics  [facility]

## 2017-05-12 NOTE — TELEPHONE ENCOUNTER
Reason for Call:  Other call back    Detailed comments: patient calling back to find out update    Phone Number Patient can be reached at: cell number on file [phone]    Best Time: any    Can we leave a detailed message on this number? YES    Call taken on 5/12/2017 at 11:31 AM by Anitha Cartwright

## 2017-05-15 NOTE — TELEPHONE ENCOUNTER
Spoke with Bambi regarding the synvisc injections. I let him know that we need him to fill out a release of information form before we can send in the completed prior authorization form as requested by [facility]. I left this at the  of the [facility] for him to complete. I told him that once this is complete I can fax in the prior authorization. I advised him to cancel his appointment for 5/16/17 because we will not have the prior authorization completed by that time and to wait until we hear back from [facility] before scheduling his appointment. I warned him that given his health history it is possible that his insurance will deny the injections but we will still let the process play out. He expressed understanding of this plan and had no further questions at this time.    Godfrey Pinon PA-C  Supervising physician: Juliano Childers MD  Dept. of Orthopedics  [facility]

## 2017-05-18 ENCOUNTER — TELEPHONE (OUTPATIENT)
Dept: ORTHOPEDICS | Facility: CLINIC | Age: 82
End: 2017-05-18

## 2017-05-18 NOTE — TELEPHONE ENCOUNTER
Reason for Call:  Other Call back    Detailed comments: Patient calling to check if OK had been received for Dr. Childers to give longer lasting injections in his knees.    Phone Number Patient can be reached at: Home number on file [phone] (home)    Best Time: Any    Can we leave a detailed message on this number? YES    Call taken on 5/18/2017 at 1:53 PM by Jose Olsen

## 2017-05-19 NOTE — TELEPHONE ENCOUNTER
Spoke with Bambi regarding his request. I asked if he had completed the release of information form yet and he had not. He stated that he went to the [facility] to do this and I informed him that I had left it at the [facility] . I let him know that once he completes this I will send out the prior authorization paperwork on Monday 5/22/17. He had no further questions at this time.    VALE CorralesC  Supervising physician: Juliano Childers MD  Dept. of Orthopedics  [facility]

## 2017-05-26 ENCOUNTER — TELEPHONE (OUTPATIENT)
Dept: ORTHOPEDICS | Facility: CLINIC | Age: 82
End: 2017-05-26

## 2017-05-26 NOTE — TELEPHONE ENCOUNTER
Spoke with Bambi letting him know that we spoke with [facility] and they stated that they would do what they can to try and expedite the prior authorization. I told Bambi that we would contact him once we have received the response from [facility].    VALE CorralesC  Supervising physician: Juliano Childers MD  Dept. of Orthopedics  [facility]

## 2017-05-26 NOTE — TELEPHONE ENCOUNTER
Reason for Call:  Other patient request    Detailed comments: patient needing an injection called Synvisc. Patient states that there needs to be an authorization on it and asking for Dr. Chliders to call [employer] to get it approved [phone]    Phone Number Patient can be reached at: Home number on file [phone] (home)    Best Time: any time    Can we leave a detailed message on this number? YES    Call taken on 5/26/2017 at 1:26 PM by Yolanda Newton

## 2017-05-26 NOTE — TELEPHONE ENCOUNTER
Spoke with Bambi letting him know that I sent in the prior authorization paperwork on 5/22/17 and that he should check with [facility] regarding the status. He had no further questions at this time.    Godfrey Pinon PA-C  Supervising physician: Juliano Childers MD  Dept. of Orthopedics  [facility]

## 2017-06-05 ENCOUNTER — OFFICE VISIT (OUTPATIENT)
Dept: ORTHOPEDICS | Facility: CLINIC | Age: 82
End: 2017-06-05
Payer: COMMERCIAL

## 2017-06-05 VITALS — BODY MASS INDEX: 24.71 KG/M2 | TEMPERATURE: 97.4 F | HEIGHT: 68 IN | RESPIRATION RATE: 18 BRPM | WEIGHT: 163 LBS

## 2017-06-05 DIAGNOSIS — M17.0 PRIMARY OSTEOARTHRITIS OF BOTH KNEES: Primary | ICD-10-CM

## 2017-06-05 PROCEDURE — 20610 DRAIN/INJ JOINT/BURSA W/O US: CPT | Mod: 50 | Performed by: ORTHOPAEDIC SURGERY

## 2017-06-05 NOTE — NURSING NOTE
"Chief Complaint   Patient presents with     RECHECK     Bilateral knee OA last injection 1/24/17.       Initial Temp 97.4  F (36.3  C)  Resp 18  Ht 1.727 m (5' 8\")  Wt 73.9 kg (163 lb)  BMI 24.78 kg/m2 Estimated body mass index is 24.78 kg/(m^2) as calculated from the following:    Height as of this encounter: 1.727 m (5' 8\").    Weight as of this encounter: 73.9 kg (163 lb).  Medication Reconciliation: complete   Sarah William MA      "

## 2017-06-05 NOTE — LETTER
6/5/2017       RE: Bambi Feldman  [address] [facility] [address]           Dear Colleague,    Thank you for referring your patient, Bambi Feldman, to the [facility]. Please see a copy of my visit note below.    Injected 96 mg as directed one for 1 dose dispense 12ml 6cc of Synvisc one injected into bilateral knee after local anesthetic by Dr. Childers product number [id number] Lot#[id number] exp. 10/31/19            Follow up bilateral knee osteoarthritis.  X-ray on 1/24/17 shows severe osteoarthritis with medial wear.  Has had prior treatment with Symptom treatment  Activity modification  Steroid injection last 1/24/17  NSAID use: naproxen (Anaprox, Naprosyn, Naprelan)  Physical Therapy: No  Pain limits him with walking, ambulating stairs and kneeling, bending, sleeping.  Range of motion 0-130 degrees..    Tenderness at medial knee.  Prior Synvisc injection? No  Any other joint disease such as rheumatoid arthritis or psoriatic arthritis?  No    Assessment:  Bilateral knee osteoarthritis.  He  desires Synvisc injection today of bilateral knee(s).  Risks, benefits, potential complications and alternatives were discussed.   With the patient's consent, sterile prep was performed of bilateral knee(s).  Each knee was injected with 6 cc Synvisc-One after lidocaine infiltration of the needle tract at anterolateral site.  Return to clinic as needed.         Again, thank you for allowing me to participate in the care of your patient.        Sincerely,              Juliano Childers MD

## 2017-06-05 NOTE — MR AVS SNAPSHOT
"              After Visit Summary   6/5/2017    Bambi Feldman    MRN: [id number]           Patient Information     Date Of Birth          12/23/1927        Visit Information        Provider Department      6/5/2017 1:45 PM Juliano Childers MD [facility]        Today's Diagnoses     Primary osteoarthritis of both knees    -  1      Care Instructions    Your knee was injected with Synvisc (a lubricant) today.  It will not numb the knee.  There should be some improvement right away, but it reaches peak effect in two months.  Resume activity as tolerated.            Follow-ups after your visit        Who to contact     If you have questions or need follow up information about today's clinic visit or your schedule please contact [facility] directly at [phone].  Normal or non-critical lab and imaging results will be communicated to you by MyChart, letter or phone within 4 business days after the clinic has received the results. If you do not hear from us within 7 days, please contact the clinic through MyChart or phone. If you have a critical or abnormal lab result, we will notify you by phone as soon as possible.  Submit refill requests through [employer] or call your pharmacy and they will forward the refill request to us. Please allow 3 business days for your refill to be completed.          Additional Information About Your Visit        MyChart Information     [employer] lets you send messages to your doctor, view your test results, renew your prescriptions, schedule appointments and more. To sign up, go to [email or web address] . Click on \"Log in\" on the left side of the screen, which will take you to the Welcome page. Then click on \"Sign up Now\" on the right side of the page.     You will be asked to enter the access code listed below, as well as some personal information. Please follow the directions to create your username and password.     Your access code is: " "[email or web address]  Expires: 2017  2:25 PM     Your access code will  in 90 days. If you need help or a new code, please call your [facility] clinic or [phone].        Care EveryWhere ID     This is your Care EveryWhere ID. This could be used by other organizations to access your [facility] medical records  [id number]        Your Vitals Were     Temperature Respirations Height BMI (Body Mass Index)          97.4  F (36.3  C) 18 1.727 m (5' 8\") 24.78 kg/m2         Blood Pressure from Last 3 Encounters:   16 121/62   16 132/76   03/10/16 128/64    Weight from Last 3 Encounters:   17 73.9 kg (163 lb)   17 77.1 kg (170 lb)   17 76.7 kg (169 lb)              We Performed the Following     C SYNVISC PER 1 MG     DRAIN/INJECT LARGE JOINT/BURSA          Today's Medication Changes          These changes are accurate as of: 17  7:48 PM.  If you have any questions, ask your nurse or doctor.               Start taking these medicines.        Dose/Directions    Hylan 48 MG/6ML Sosy injection   Commonly known as:  SYNVISC ONE   Used for:  Primary osteoarthritis of both knees   Started by:  Juliano Childers MD        Dose:  6 mL   6 mLs by INTRA-ARTICULAR route once for 1 dose   Quantity:  6 mL   Refills:  0            Where to get your medicines      Some of these will need a paper prescription and others can be bought over the counter.  Ask your nurse if you have questions.     You don't need a prescription for these medications     Hylan 48 MG/6ML Sosy injection                Primary Care Provider Office Phone # Fax #    Chandrika Cordova  [phone]       [address] [address]        Thank you!     Thank you for choosing [facility]  for your care. Our goal is always to provide you with excellent care. Hearing back from our patients is one way we can continue to improve our services. Please take a few " minutes to complete the written survey that you may receive in the mail after your visit with us. Thank you!             Your Updated Medication List - Protect others around you: Learn how to safely use, store and throw away your medicines at www.disposemymeds.org.          This list is accurate as of: 6/5/17  7:48 PM.  Always use your most recent med list.                   Brand Name Dispense Instructions for use    ALEVE 220 MG tablet   Generic drug:  naproxen sodium      Take 220 mg by mouth nightly as needed for moderate pain       REINALDO SEED PO      Take 1 teaspoonful by mouth daily       cholecalciferol 41160 UNITS capsule    vitamin D3     Take 1 capsule by mouth daily.       co-enzyme Q-10 100 MG Caps capsule      Take  by mouth daily.       cyanocolbalamin 500 MCG tablet    vitamin  B-12     Take 500 mcg by mouth daily       Garlic 2000 MG Caps      Take  by mouth.       Ginger Root Powd      Take 1 Dose by mouth daily       Hylan 48 MG/6ML Sosy injection    SYNVISC ONE    6 mL    6 mLs by INTRA-ARTICULAR route once for 1 dose       lisinopril 10 MG tablet    PRINIVIL/ZESTRIL    90 tablet    Take 1 tablet (10 mg) by mouth daily       magnesium 100 MG Caps      Take  by mouth.       pravastatin 10 MG tablet    PRAVACHOL    90 tablet    Take 1 tablet (10 mg) by mouth daily       RED YEAST RICE PO          UNABLE TO FIND      daily MEDICATION NAME: Super Vitamin B

## 2017-06-05 NOTE — PROGRESS NOTES
Injected 96 mg as directed one for 1 dose dispense 12ml 6cc of Synvisc one injected into bilateral knee after local anesthetic by Dr. Childers product number [id number] Lot#[id number] exp. 10/31/19

## 2017-06-06 NOTE — PROGRESS NOTES
Follow up bilateral knee osteoarthritis.  X-ray on 1/24/17 shows severe osteoarthritis with medial wear.  Has had prior treatment with Symptom treatment  Activity modification  Steroid injection last 1/24/17  NSAID use: naproxen (Anaprox, Naprosyn, Naprelan)  Physical Therapy: No  Pain limits him with walking, ambulating stairs and kneeling, bending, sleeping.  Range of motion 0-130 degrees..    Tenderness at medial knee.  Prior Synvisc injection? No  Any other joint disease such as rheumatoid arthritis or psoriatic arthritis?  No    Assessment:  Bilateral knee osteoarthritis.  He  desires Synvisc injection today of bilateral knee(s).  Risks, benefits, potential complications and alternatives were discussed.   With the patient's consent, sterile prep was performed of bilateral knee(s).  Each knee was injected with 6 cc Synvisc-One after lidocaine infiltration of the needle tract at anterolateral site.  Return to clinic as needed.

## 2017-06-06 NOTE — PATIENT INSTRUCTIONS
Your knee was injected with Synvisc (a lubricant) today.  It will not numb the knee.  There should be some improvement right away, but it reaches peak effect in two months.  Resume activity as tolerated.

## 2017-07-31 ENCOUNTER — TRANSFERRED RECORDS (OUTPATIENT)
Dept: HEALTH INFORMATION MANAGEMENT | Facility: CLINIC | Age: 82
End: 2017-07-31

## 2017-07-31 LAB
CREAT SERPL-MCNC: 1.57 MG/DL (ref 0.6–1.2)
GFR SERPL CREATININE-BSD FRML MDRD: 44 ML/MIN/1.73M2
TSH SERPL-ACNC: 3.46 UIU/ML (ref 0.47–4.68)

## 2017-08-03 ENCOUNTER — OFFICE VISIT (OUTPATIENT)
Dept: FAMILY MEDICINE | Facility: CLINIC | Age: 82
End: 2017-08-03
Payer: COMMERCIAL

## 2017-08-03 VITALS
SYSTOLIC BLOOD PRESSURE: 136 MMHG | TEMPERATURE: 97.3 F | BODY MASS INDEX: 24.33 KG/M2 | DIASTOLIC BLOOD PRESSURE: 71 MMHG | OXYGEN SATURATION: 98 % | WEIGHT: 160 LBS | HEART RATE: 69 BPM

## 2017-08-03 DIAGNOSIS — K52.9 GASTROENTERITIS: ICD-10-CM

## 2017-08-03 DIAGNOSIS — D72.828 NEUTROPHILIC LEUKOCYTOSIS: Primary | ICD-10-CM

## 2017-08-03 LAB
ALBUMIN SERPL-MCNC: 3.4 G/DL (ref 3.4–5)
ALP SERPL-CCNC: 96 U/L (ref 40–150)
ALT SERPL W P-5'-P-CCNC: 40 U/L (ref 0–70)
ANION GAP SERPL CALCULATED.3IONS-SCNC: 8 MMOL/L (ref 3–14)
AST SERPL W P-5'-P-CCNC: 22 U/L (ref 0–45)
BASOPHILS # BLD AUTO: 0.1 10E9/L (ref 0–0.2)
BASOPHILS NFR BLD AUTO: 1.1 %
BILIRUB SERPL-MCNC: 0.6 MG/DL (ref 0.2–1.3)
BUN SERPL-MCNC: 21 MG/DL (ref 7–30)
CALCIUM SERPL-MCNC: 9 MG/DL (ref 8.5–10.1)
CHLORIDE SERPL-SCNC: 103 MMOL/L (ref 94–109)
CO2 SERPL-SCNC: 26 MMOL/L (ref 20–32)
CREAT SERPL-MCNC: 1.11 MG/DL (ref 0.66–1.25)
DIFFERENTIAL METHOD BLD: NORMAL
EOSINOPHIL # BLD AUTO: 0.1 10E9/L (ref 0–0.7)
EOSINOPHIL NFR BLD AUTO: 1.2 %
ERYTHROCYTE [DISTWIDTH] IN BLOOD BY AUTOMATED COUNT: 13.8 % (ref 10–15)
GFR SERPL CREATININE-BSD FRML MDRD: 62 ML/MIN/1.7M2
GLUCOSE SERPL-MCNC: 105 MG/DL (ref 70–99)
HCT VFR BLD AUTO: 40.5 % (ref 40–53)
HGB BLD-MCNC: 13.6 G/DL (ref 13.3–17.7)
LYMPHOCYTES # BLD AUTO: 2.2 10E9/L (ref 0.8–5.3)
LYMPHOCYTES NFR BLD AUTO: 33.1 %
MAGNESIUM SERPL-MCNC: 1.9 MG/DL (ref 1.6–2.3)
MCH RBC QN AUTO: 30.8 PG (ref 26.5–33)
MCHC RBC AUTO-ENTMCNC: 33.6 G/DL (ref 31.5–36.5)
MCV RBC AUTO: 92 FL (ref 78–100)
MONOCYTES # BLD AUTO: 0.7 10E9/L (ref 0–1.3)
MONOCYTES NFR BLD AUTO: 10.9 %
NEUTROPHILS # BLD AUTO: 3.5 10E9/L (ref 1.6–8.3)
NEUTROPHILS NFR BLD AUTO: 53.7 %
PLATELET # BLD AUTO: 264 10E9/L (ref 150–450)
POTASSIUM SERPL-SCNC: 4.7 MMOL/L (ref 3.4–5.3)
PROT SERPL-MCNC: 7 G/DL (ref 6.8–8.8)
RBC # BLD AUTO: 4.41 10E12/L (ref 4.4–5.9)
SODIUM SERPL-SCNC: 137 MMOL/L (ref 133–144)
WBC # BLD AUTO: 6.5 10E9/L (ref 4–11)

## 2017-08-03 PROCEDURE — 36415 COLL VENOUS BLD VENIPUNCTURE: CPT | Performed by: NURSE PRACTITIONER

## 2017-08-03 PROCEDURE — 85025 COMPLETE CBC W/AUTO DIFF WBC: CPT | Performed by: NURSE PRACTITIONER

## 2017-08-03 PROCEDURE — 80053 COMPREHEN METABOLIC PANEL: CPT | Performed by: NURSE PRACTITIONER

## 2017-08-03 PROCEDURE — 83735 ASSAY OF MAGNESIUM: CPT | Performed by: NURSE PRACTITIONER

## 2017-08-03 PROCEDURE — 99213 OFFICE O/P EST LOW 20 MIN: CPT | Performed by: NURSE PRACTITIONER

## 2017-08-03 ASSESSMENT — PAIN SCALES - GENERAL: PAINLEVEL: NO PAIN (0)

## 2017-08-03 NOTE — PROGRESS NOTES
SUBJECTIVE:                                                    Bambi Feldman is a 89 year old male who presents to clinic today for the following health issues:      Acute Illness   Acute illness concerns: Diarrhea and vomiting  Onset: Last Monday    Fever: no    Chills/Sweats: YES- Monday night    Headache (location?): no    Sinus Pressure:no    Conjunctivitis:  no    Ear Pain: no    Rhinorrhea: no    Congestion: no    Sore Throat: no     Cough: no    Wheeze: no    Decreased Appetite: YES    Nausea: YES- Monday     Vomiting: YES- Monday x 6    Diarrhea:  YES- Monday and may now be constipated.    Dysuria/Freq.: no    Fatigue/Achiness: YES    Sick/Strep Exposure: no     Therapies Tried and outcome: Went to a clinic in [place], [place]    Seen at [facility] 8/1  Seen for diarrhea and vomiting  No fever  No diarrhea or vomiting since 8/1  Denies nausea  Appetite is normal    UA normal  TSH normal  WBC 21.18  Elevated neutrophils 19.23  90.85%    Denies new pains  Rash  Cough, SOB, lightheadedness, fatigue, malaise  No tick bites      Problem list and histories reviewed & adjusted, as indicated.  Additional history: none    Patient Active Problem List   Diagnosis     HTN (hypertension)     Other malignant neoplasm of skin, site unspecified     OA (osteoarthritis) of knee     HYPERLIPIDEMIA LDL GOAL <130     Advanced directives, counseling/discussion     Elevated serum creatinine     Primary osteoarthritis of both knees     Trochanteric bursitis of right hip     Past Surgical History:   Procedure Laterality Date     SUPRAPUBIC PROSTATECTOMY       SURGICAL HISTORY OF -       skin cancer right neck and lip       Social History   Substance Use Topics     Smoking status: Former Smoker     Quit date: 1/20/1970     Smokeless tobacco: Never Used     Alcohol use Yes      Comment: 1 drink per night      Family History   Problem Relation Age of Onset     Alzheimer Disease Sister      C.A.D. No family hx of      DIABETES No  family hx of      Hypertension No family hx of      CEREBROVASCULAR DISEASE No family hx of      Breast Cancer No family hx of      Cancer - colorectal No family hx of      Prostate Cancer No family hx of              Reviewed and updated as needed this visit by clinical staffTobacco  Allergies  Meds  Med Hx  Surg Hx  Fam Hx  Soc Hx      Reviewed and updated as needed this visit by Provider         ROS:  Constitutional, HEENT, cardiovascular, pulmonary, gi and gu systems are negative, except as otherwise noted.      OBJECTIVE:   /71 (BP Location: Right arm, Patient Position: Chair, Cuff Size: Adult Regular)  Pulse 69  Temp 97.3  F (36.3  C) (Oral)  Wt 160 lb (72.6 kg)  SpO2 98%  BMI 24.33 kg/m2  Body mass index is 24.33 kg/(m^2).  GENERAL: healthy, alert and no distress  NECK: no adenopathy, no asymmetry, masses, or scars and thyroid normal to palpation  RESP: lungs clear to auscultation - no rales, rhonchi or wheezes  CV: regular rate and rhythm, normal S1 S2, no S3 or S4, no murmur, click or rub, no peripheral edema and peripheral pulses strong  ABDOMEN: soft, nontender, no hepatosplenomegaly, no masses and bowel sounds normal  MS: no gross musculoskeletal defects noted, no edema, gait steady and age appropriate    Diagnostic Test Results:  none     ASSESSMENT/PLAN:       ICD-10-CM    1. Neutrophilic leukocytosis D72.9 CBC with platelets and differential   2. Gastroenteritis K52.9 Comprehensive metabolic panel (BMP + Alb, Alk Phos, ALT, AST, Total. Bili, TP)     Magnesium       Patient with neutrophilia in the setting of gastroenteritis. Will evaluate for improvement in white count  Symptoms improving so I would expect blood work to be returning to normal  Otherwise, check peripheral smear    [facility]      This encounter has been reviewed.  The patient was not examined by me.  JUDE LANDAVERDE M.D.   Supervising Physician in Internal Medicine,  [place].

## 2017-08-03 NOTE — MR AVS SNAPSHOT
"              After Visit Summary   8/3/2017    Bambi Feldman    MRN: [id number]           Patient Information     Date Of Birth          12/23/1927        Visit Information        Provider Department      8/3/2017 8:40 AM Vinita Medel APRN CNP [facility]        Today's Diagnoses     Neutrophilic leukocytosis    -  1    Gastroenteritis           Follow-ups after your visit        Your next 10 appointments already scheduled     Aug 03, 2017 10:20 AM CDT   SHORT with Remy Alatorre MD   [facility] ([facility])    [address] [place] [id number] [phone]              Who to contact     If you have questions or need follow up information about today's clinic visit or your schedule please contact [facility] directly at [phone].  Normal or non-critical lab and imaging results will be communicated to you by MyChart, letter or phone within 4 business days after the clinic has received the results. If you do not hear from us within 7 days, please contact the clinic through MyChart or phone. If you have a critical or abnormal lab result, we will notify you by phone as soon as possible.  Submit refill requests through [employer] or call your pharmacy and they will forward the refill request to us. Please allow 3 business days for your refill to be completed.          Additional Information About Your Visit        MyChart Information     [employer] lets you send messages to your doctor, view your test results, renew your prescriptions, schedule appointments and more. To sign up, go to [email or web address] . Click on \"Log in\" on the left side of the screen, which will take you to the Welcome page. Then click on \"Sign up Now\" on the right side of the page.     You will be asked to enter the access code listed below, as well as some personal information. Please follow the directions to " create your username and password.     Your access code is: [email or web address]  Expires: 2017  9:12 AM     Your access code will  in 90 days. If you need help or a new code, please call your [facility] or [phone].        Care EveryWhere ID     This is your Care EveryWhere ID. This could be used by other organizations to access your [place] medical records  [id number]        Your Vitals Were     Pulse Temperature Pulse Oximetry BMI (Body Mass Index)          69 97.3  F (36.3  C) (Oral) 98% 24.33 kg/m2         Blood Pressure from Last 3 Encounters:   17 136/71   16 121/62   16 132/76    Weight from Last 3 Encounters:   17 160 lb (72.6 kg)   17 163 lb (73.9 kg)   17 170 lb (77.1 kg)              We Performed the Following     CBC with platelets and differential     Comprehensive metabolic panel (BMP + Alb, Alk Phos, ALT, AST, Total. Bili, TP)     Magnesium        Primary Care Provider Office Phone # Fax #    Chandrika Cordova [phone]       [address] [address]        Equal Access to Services     MARYBETH SAN : Hadii aad ku hadasho Soomaali, waaxda luqadaha, qaybta kaalmada adeegyada, familia olivas. So [facility] [phone].    ATENCIÓN: Si habla español, tiene a torres disposición servicios gratuitos de asistencia lingüística. ame al [phone].    We comply with applicable federal civil rights laws and [place] laws. We do not discriminate on the basis of race, color, national origin, age, disability sex, sexual orientation or gender identity.            Thank you!     Thank you for choosing [facility]  for your care. Our goal is always to provide you with excellent care. Hearing back from our patients is one way we can continue to improve our services. Please take a few minutes to complete the written survey that you may receive in the mail after your  visit with us. Thank you!             Your Updated Medication List - Protect others around you: Learn how to safely use, store and throw away your medicines at www.disposemymeds.org.          This list is accurate as of: 8/3/17  9:12 AM.  Always use your most recent med list.                   Brand Name Dispense Instructions for use Diagnosis    ALEVE 220 MG tablet   Generic drug:  naproxen sodium      Take 220 mg by mouth nightly as needed for moderate pain        REINALDO SEED PO      Take 1 teaspoonful by mouth daily        cholecalciferol 68411 UNITS capsule    vitamin D3     Take 1 capsule by mouth daily.        co-enzyme Q-10 100 MG Caps capsule      Take  by mouth daily.        cyanocolbalamin 500 MCG tablet    vitamin  B-12     Take 500 mcg by mouth daily    Diarrhea, unspecified type       Garlic 2000 MG Caps      Take  by mouth.        Ginger Root Powd      Take 1 Dose by mouth daily        lisinopril 10 MG tablet    PRINIVIL/ZESTRIL    90 tablet    Take 1 tablet (10 mg) by mouth daily    Essential hypertension       magnesium 100 MG Caps      Take  by mouth.        pravastatin 10 MG tablet    PRAVACHOL    90 tablet    Take 1 tablet (10 mg) by mouth daily    Hyperlipidemia LDL goal <130       RED YEAST RICE PO           UNABLE TO FIND      daily MEDICATION NAME: Super Vitamin B    Diarrhea, unspecified type

## 2017-08-03 NOTE — NURSING NOTE
"Chief Complaint   Patient presents with     Patient Request     Seen at a clinic from [place], [place] high WBC counts       Initial /71 (BP Location: Right arm, Patient Position: Chair, Cuff Size: Adult Regular)  Pulse 69  Temp 97.3  F (36.3  C) (Oral)  Wt 160 lb (72.6 kg)  SpO2 98%  BMI 24.33 kg/m2 Estimated body mass index is 24.33 kg/(m^2) as calculated from the following:    Height as of 6/5/17: 5' 8\" (1.727 m).    Weight as of this encounter: 160 lb (72.6 kg).  Medication Reconciliation: complete.  ZEYAD Rivera MA      "

## 2017-09-08 ENCOUNTER — OFFICE VISIT (OUTPATIENT)
Dept: FAMILY MEDICINE | Facility: CLINIC | Age: 82
End: 2017-09-08
Payer: COMMERCIAL

## 2017-09-08 VITALS
HEART RATE: 63 BPM | TEMPERATURE: 96.6 F | BODY MASS INDEX: 24.63 KG/M2 | WEIGHT: 162 LBS | OXYGEN SATURATION: 99 % | DIASTOLIC BLOOD PRESSURE: 67 MMHG | SYSTOLIC BLOOD PRESSURE: 147 MMHG

## 2017-09-08 DIAGNOSIS — I10 HYPERTENSION GOAL BP (BLOOD PRESSURE) < 140/90: ICD-10-CM

## 2017-09-08 DIAGNOSIS — E78.5 HYPERLIPIDEMIA LDL GOAL <130: ICD-10-CM

## 2017-09-08 DIAGNOSIS — Z01.818 PREOPERATIVE EXAMINATION: Primary | ICD-10-CM

## 2017-09-08 DIAGNOSIS — M17.0 PRIMARY OSTEOARTHRITIS OF BOTH KNEES: ICD-10-CM

## 2017-09-08 DIAGNOSIS — Z01.818 PREOP GENERAL PHYSICAL EXAM: ICD-10-CM

## 2017-09-08 DIAGNOSIS — Z23 NEED FOR PNEUMOCOCCAL VACCINATION: ICD-10-CM

## 2017-09-08 DIAGNOSIS — R79.89 ELEVATED SERUM CREATININE: ICD-10-CM

## 2017-09-08 PROCEDURE — 93000 ELECTROCARDIOGRAM COMPLETE: CPT | Performed by: INTERNAL MEDICINE

## 2017-09-08 PROCEDURE — 99214 OFFICE O/P EST MOD 30 MIN: CPT | Performed by: INTERNAL MEDICINE

## 2017-09-08 RX ORDER — PRAVASTATIN SODIUM 10 MG
10 TABLET ORAL DAILY
Qty: 90 TABLET | Refills: 3 | Status: SHIPPED | OUTPATIENT
Start: 2017-09-08 | End: 2020-01-21

## 2017-09-08 RX ORDER — LISINOPRIL 10 MG/1
10 TABLET ORAL DAILY
Qty: 90 TABLET | Refills: 3 | Status: SHIPPED | OUTPATIENT
Start: 2017-09-08 | End: 2018-10-16

## 2017-09-08 NOTE — PROGRESS NOTES
[address] [address]  [phone]  Dept: [phone]    PRE-OP EVALUATION:  Today's date: 2017    Bambi Feldman (: 1927) presents for pre-operative evaluation assessment as requested by  .  He requires evaluation and anesthesia risk assessment prior to undergoing surgery/procedure for treatment of left eye .  Proposed procedure: cataract    Date of Surgery/ Procedure: 17  Time of Surgery/ Procedure: 11:20 A.M.  Hospital/Surgical Facility: [facility]  Fax number for surgical facility: [phone]  Primary Physician: Chandrika Cordova  Type of Anesthesia Anticipated: to be determined    Patient has a Health Care Directive or Living Will:  NO    Preop Questions 2017   1.  Do you have a history of heart attack, stroke, stent, bypass or surgery on an artery in the head, neck, heart or legs? No   2.  Do you ever have any pain or discomfort in your chest? No   3.  Do you have a history of  Heart Failure? No   4.   Are you troubled by shortness of breath when:  walking on a level surface, or up a slight hill, or at night? No   5.  Do you currently have a cold, bronchitis or other respiratory infection? No   6.  Do you have a cough, shortness of breath, or wheezing? No   7.  Do you sometimes get pains in the calves of your legs when you walk? No   8. Do you or anyone in your family have previous history of blood clots? No   9.  Do you or does anyone in your family have a serious bleeding problem such as prolonged bleeding following surgeries or cuts? No   10. Have you ever had problems with anemia or been told to take iron pills? No   11. Have you had any abnormal blood loss such as black, tarry or bloody stools? No   12. Have you ever had a blood transfusion? No   13. Have you or any of your relatives ever had problems with anesthesia? No   14. Do you have sleep apnea, excessive snoring or daytime drowsiness? No   15. Do  you have any prosthetic heart valves? No   16. Do you have prosthetic joints? No           HPI:                                                      Brief HPI related to upcoming procedure: will have cataract surgery.      Patient reports normal XIMENA at [facility] last month, too    HYPERTENSION - Patient has longstanding history of mod-severe HTN , currently denies any symptoms referable to elevated blood pressure. Specifically denies chest pain, palpitations, dyspnea, orthopnea, PND or peripheral edema. Blood pressure readings have been in normal range. Current medication regimen is as listed below. Patient denies any side effects of medication.                                                                                                                                                                                          .  HYPOTHYROIDISM - Patient has a longstanding history of chronic Hypothyroidism. Patient has been doing well, noting no tremor, insomnia, hair loss or changes in skin texture. Last TSH value of 3.4 on 7/31/17. Continues to take medications as directed, without adverse reactions or side effects.                                                                                                                                                                                                                        .  RENAL INSUFFICIENCY - Patient has a longstanding history of chronic renal insufficiency of moderate severity. Exacerbating triggers in the past have been NA. Last Cr 1.11 on 8/3/17  .                                                                                                                                                                               .    MEDICAL HISTORY:                                                    Patient Active Problem List    Diagnosis Date Noted     Trochanteric bursitis of right hip 02/06/2017     Priority: Medium     Primary osteoarthritis of both knees  01/24/2017     Priority: Medium     Elevated serum creatinine 03/10/2016     Priority: Medium     Advanced directives, counseling/discussion 03/13/2013     Priority: Medium     Advance Care Planning:   ACP Review and Resources Provided:  Reviewed chart for advance care plan.  Bambi Feldman has no plan or code status on file. Discussed available resources and provided with information. Confirmed code status reflects current choices pending further ACP discussions.  Confirmed/documented designated decision maker(s). See permanent comments section of demographics in clinical tab.   Added by Julio Alberto on 3/13/2013             HYPERLIPIDEMIA LDL GOAL <130 05/09/2010     Priority: Medium     OA (osteoarthritis) of knee 02/09/2010     Priority: Medium     Hypertension goal BP (blood pressure) < 140/90      Priority: Medium     Just started medication in 2009       Other malignant neoplasm of skin, site unspecified      Priority: Medium     Non-melanoma skin cancer.  Treated by Dr[facility] Corea at [facility]  Problem list name updated by automated process. Provider to review and confirm        Past Medical History:   Diagnosis Date     BPH (benign prostatic hyperplasia)      Elevated cholesterol      HTN (hypertension)      Other malignant neoplasm of skin, site unspecified     Non-melanoma skin cancer     Past Surgical History:   Procedure Laterality Date     SUPRAPUBIC PROSTATECTOMY       SURGICAL HISTORY OF -       skin cancer right neck and lip     Current Outpatient Prescriptions   Medication Sig Dispense Refill     cyanocolbalamin (VITAMIN  B-12) 500 MCG tablet Take 500 mcg by mouth daily       UNABLE TO FIND daily MEDICATION NAME: Super Vitamin B       REINALDO SEED PO Take 1 teaspoonful by mouth daily       Ginger Root POWD Take 1 Dose by mouth daily       naproxen sodium (ALEVE) 220 MG tablet Take 220 mg by mouth nightly as needed for moderate pain       pravastatin (PRAVACHOL) 10 MG tablet Take 1 tablet (10 mg) by mouth daily 90  tablet 1     lisinopril (PRINIVIL,ZESTRIL) 10 MG tablet Take 1 tablet (10 mg) by mouth daily 90 tablet 1     Red Yeast Rice Extract (RED YEAST RICE PO)        Magnesium 100 MG CAPS Take  by mouth.       Garlic 2000 MG CAPS Take  by mouth.       co-enzyme Q-10 (COQ10) 100 MG CAPS Take  by mouth daily.       cholecalciferol (VITAMIN D3) 91648 UNIT capsule Take 1 capsule by mouth daily.       OTC products: None, except as noted above    Allergies   Allergen Reactions     Nkda [No Known Drug Allergies]       Latex Allergy: NO    Social History   Substance Use Topics     Smoking status: Former Smoker     Quit date: 1/20/1970     Smokeless tobacco: Never Used     Alcohol use Yes      Comment: 1 drink per night      History   Drug Use No       REVIEW OF SYSTEMS:                                                    C: NEGATIVE for fever, chills, change in weight  E/M: NEGATIVE for ear, mouth and throat problems  R: NEGATIVE for significant cough or SOB  CV: NEGATIVE for chest pain, palpitations or peripheral edema  GI: NEGATIVE for nausea, abdominal pain, heartburn, or change in bowel habits  MUSCULOSKELETAL: POSITIVE  for arthralgias in knees lately: Getting injections via [facility]    EXAM:                                                    /67 (BP Location: Right arm, Patient Position: Chair, Cuff Size: Adult Regular)  Pulse 63  Temp 96.6  F (35.9  C) (Oral)  Wt 162 lb (73.5 kg)  SpO2 99%  BMI 24.63 kg/m2  GENERAL APPEARANCE: healthy, alert and no distress  HENT: ear canals and TM's normal and nose and mouth without ulcers or lesions. 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     DIAGNOSTICS:                                                    EKG: appears normal, NSR, normal axis, normal intervals, no acute ST/T changes c/w ischemia, no LVH by voltage criteria, Right Bundle Branch Block, unchanged from previous tracings    Recent Labs   Lab Test  08/03/17   0916 07/31/17  03/10/16   1343   HGB  13.6   --    --    PLT  264   --    --    NA  137   --   135   POTASSIUM  4.7   --   4.4   CR  1.11  1.57*  1.28*        IMPRESSION:                                                    Reason for surgery/procedure: cataract  Diagnosis/reason for consult: med clearance    The proposed surgical procedure is considered LOW risk.    REVISED CARDIAC RISK INDEX  The patient has the following serious cardiovascular risks for perioperative complications such as (MI, PE, VFib and 3  AV Block):  No serious cardiac risks  INTERPRETATION: 0 risks: Class I (very low risk - 0.4% complication rate)    The patient has the following additional risks for perioperative complications:  No identified additional risks      ICD-10-CM    1. Preoperative examination Z01.818    2. Primary osteoarthritis of both knees M17.0    3. Elevated serum creatinine R79.89    4. Need for pneumococcal vaccination Z23      Recent BMP and CBC and FLP all normal at his [facility] check up    RECOMMENDATIONS:                                                        Cardiovascular Risk  Performs 4 METs exercise without symptoms (Light housework (dusting, washing dishes)) .         --Patient is to take all scheduled medications on the day of surgery EXCEPT for modifications listed below.    Hold all supplements until after surgery    APPROVAL GIVEN to proceed with proposed procedure, without further diagnostic evaluation       Signed Electronically by: Jessa Alston MD    Copy of this evaluation report is provided to requesting physician.    [facility] Preop Guidelines

## 2017-09-08 NOTE — NURSING NOTE
"Chief Complaint   Patient presents with     Pre-Op Exam     [facility]     Health Maintenance     fall risk       Initial /67 (BP Location: Right arm, Patient Position: Chair, Cuff Size: Adult Regular)  Pulse 63  Temp 96.6  F (35.9  C) (Oral)  Wt 162 lb (73.5 kg)  SpO2 99%  BMI 24.63 kg/m2 Estimated body mass index is 24.63 kg/(m^2) as calculated from the following:    Height as of 6/5/17: 5' 8\" (1.727 m).    Weight as of this encounter: 162 lb (73.5 kg).  Medication Reconciliation: complete   Candida Lennon ma      "

## 2017-09-08 NOTE — MR AVS SNAPSHOT
After Visit Summary   9/8/2017    Bambi Feldman    MRN: [id number]           Patient Information     Date Of Birth          12/23/1927        Visit Information        Provider Department      9/8/2017 10:20 AM Jessa Alston MD [facility]        Today's Diagnoses     Preoperative examination    -  1    Primary osteoarthritis of both knees        Elevated serum creatinine        Need for pneumococcal vaccination        Preop general physical exam        Hypertension goal BP (blood pressure) < 140/90        Hyperlipidemia LDL goal <130          Care Instructions    Stop all supplements until after surgery    Take only the lisinopril morning of surgery    Before Your Surgery      Call your surgeon if there is any change in your health. This includes signs of a cold or flu (such as a sore throat, runny nose, cough, rash or fever).    Do not smoke, drink alcohol or take over the counter medicine (unless your surgeon or primary care doctor tells you to) for the 24 hours before and after surgery.    If you take prescribed drugs: Follow your doctor s orders about which medicines to take and which to stop until after surgery.    Eating and drinking prior to surgery: follow the instructions from your surgeon    Take a shower or bath the night before surgery. Use the soap your surgeon gave you to gently clean your skin. If you do not have soap from your surgeon, use your regular soap. Do not shave or scrub the surgery site.  Wear clean pajamas and have clean sheets on your bed.           Follow-ups after your visit        Who to contact     If you have questions or need follow up information about today's clinic visit or your schedule please contact [facility] directly at [phone].  Normal or non-critical lab and imaging results will be communicated to you by MyChart, letter or phone within 4 business days after the clinic has received the results. If you do  "not hear from us within 7 days, please contact the clinic through [employer] or phone. If you have a critical or abnormal lab result, we will notify you by phone as soon as possible.  Submit refill requests through [employer] or call your pharmacy and they will forward the refill request to us. Please allow 3 business days for your refill to be completed.          Additional Information About Your Visit        NEHPhart Information     [employer]s lets you send messages to your doctor, view your test results, renew your prescriptions, schedule appointments and more. To sign up, go to [email or web address] . Click on \"Log in\" on the left side of the screen, which will take you to the Welcome page. Then click on \"Sign up Now\" on the right side of the page.     You will be asked to enter the access code listed below, as well as some personal information. Please follow the directions to create your username and password.     Your access code is: [email or web address]  Expires: 2017  9:12 AM     Your access code will  in 90 days. If you need help or a new code, please call your [facility] clinic or [phone].        Care EveryWhere ID     This is your Care EveryWhere ID. This could be used by other organizations to access your [facility] medical records  [id number]        Your Vitals Were     Pulse Temperature Pulse Oximetry BMI (Body Mass Index)          63 96.6  F (35.9  C) (Oral) 99% 24.63 kg/m2         Blood Pressure from Last 3 Encounters:   17 147/67   17 136/71   16 121/62    Weight from Last 3 Encounters:   17 162 lb (73.5 kg)   17 160 lb (72.6 kg)   17 163 lb (73.9 kg)              We Performed the Following     EKG 12-lead complete w/read - Clinics          Where to get your medicines      These medications were sent to [id number] - [place], [place] - [address]  [address] [address]     Phone:  [phone]     lisinopril 10 MG tablet    " pravastatin 10 MG tablet          Primary Care Provider Office Phone # Fax #    Chandrika Cordova [phone]       [address] [address]        Equal Access to Services     MARYBETH SAN : Hadii aad ku hadamyo Soionaali, waaxda luqadaha, qaybta kaalmada adeegyada, familia cunninghamteri lorenzoflako obrien heaven olivas. So [facility] [phone].    ATENCIÓN: Si habla español, tiene a torres disposición servicios gratuitos de asistencia lingüística. Llame al [phone].    We comply with applicable federal civil rights laws and [place] laws. We do not discriminate on the basis of race, color, national origin, age, disability sex, sexual orientation or gender identity.            Thank you!     Thank you for choosing [facility]  for your care. Our goal is always to provide you with excellent care. Hearing back from our patients is one way we can continue to improve our services. Please take a few minutes to complete the written survey that you may receive in the mail after your visit with us. Thank you!             Your Updated Medication List - Protect others around you: Learn how to safely use, store and throw away your medicines at www.disposemymeds.org.          This list is accurate as of: 9/8/17 10:58 AM.  Always use your most recent med list.                   Brand Name Dispense Instructions for use Diagnosis    ALEVE 220 MG tablet   Generic drug:  naproxen sodium      Take 220 mg by mouth nightly as needed for moderate pain        REINALDO SEED PO      Take 1 teaspoonful by mouth daily        cholecalciferol 37862 UNITS capsule    vitamin D3     Take 1 capsule by mouth daily.        co-enzyme Q-10 100 MG Caps capsule      Take  by mouth daily.        cyanocolbalamin 500 MCG tablet    vitamin  B-12     Take 500 mcg by mouth daily    Diarrhea, unspecified type       Garlic 2000 MG Caps      Take  by mouth.        Ginger Root Powd      Take 1 Dose by mouth daily        lisinopril  10 MG tablet    PRINIVIL/ZESTRIL    90 tablet    Take 1 tablet (10 mg) by mouth daily    Hypertension goal BP (blood pressure) < 140/90       magnesium 100 MG Caps      Take  by mouth.        pravastatin 10 MG tablet    PRAVACHOL    90 tablet    Take 1 tablet (10 mg) by mouth daily    Hyperlipidemia LDL goal <130       RED YEAST RICE PO           UNABLE TO FIND      daily MEDICATION NAME: Super Vitamin B    Diarrhea, unspecified type

## 2017-11-29 ENCOUNTER — OFFICE VISIT (OUTPATIENT)
Dept: FAMILY MEDICINE | Facility: CLINIC | Age: 82
End: 2017-11-29
Payer: COMMERCIAL

## 2017-11-29 VITALS
DIASTOLIC BLOOD PRESSURE: 67 MMHG | BODY MASS INDEX: 24.32 KG/M2 | HEIGHT: 68 IN | WEIGHT: 160.5 LBS | HEART RATE: 75 BPM | SYSTOLIC BLOOD PRESSURE: 152 MMHG | OXYGEN SATURATION: 98 %

## 2017-11-29 DIAGNOSIS — R19.8 GASTROINTESTINAL SYMPTOM: Primary | ICD-10-CM

## 2017-11-29 DIAGNOSIS — I10 HYPERTENSION GOAL BP (BLOOD PRESSURE) < 140/90: ICD-10-CM

## 2017-11-29 PROCEDURE — 99214 OFFICE O/P EST MOD 30 MIN: CPT | Performed by: FAMILY MEDICINE

## 2017-11-29 RX ORDER — POLYETHYLENE GLYCOL 3350 17 G/17G
1 POWDER, FOR SOLUTION ORAL DAILY
Qty: 510 G | Refills: 1 | Status: SHIPPED | OUTPATIENT
Start: 2017-11-29 | End: 2018-02-26

## 2017-11-29 NOTE — MR AVS SNAPSHOT
After Visit Summary   11/29/2017    Bambi Feldman    MRN: [id number]           Patient Information     Date Of Birth          12/23/1927        Visit Information        Provider Department      11/29/2017 9:00 AM Angela Gallagher MD [facility]        Today's Diagnoses     Gastrointestinal symptom    -  1       Follow-ups after your visit        Additional Services     GASTROENTEROLOGY ADULT REF CONSULT ONLY       Preferred Location: [place] [facility] [phone]      Please be aware that coverage of these services is subject to the terms and limitations of your health insurance plan.  Call member services at your health plan with any benefit or coverage questions.  Any procedures must be performed at a [place] facility OR coordinated by your clinic's referral office.    Please bring the following with you to your appointment:    (1) Any X-Rays, CTs or MRIs which have been performed.  Contact the facility where they were done to arrange for  prior to your scheduled appointment.    (2) List of current medications   (3) This referral request   (4) Any documents/labs given to you for this referral                  Who to contact     If you have questions or need follow up information about today's clinic visit or your schedule please contact [facility] directly at [phone].  Normal or non-critical lab and imaging results will be communicated to you by MyChart, letter or phone within 4 business days after the clinic has received the results. If you do not hear from us within 7 days, please contact the clinic through MyChart or phone. If you have a critical or abnormal lab result, we will notify you by phone as soon as possible.  Submit refill requests through [employer] or call your pharmacy and they will forward the refill request to us. Please allow 3 business days for your refill to be completed.          Additional Information About Your Visit       "  MyChart Information     [employer] lets you send messages to your doctor, view your test results, renew your prescriptions, schedule appointments and more. To sign up, go to [email or web address] . Click on \"Log in\" on the left side of the screen, which will take you to the Welcome page. Then click on \"Sign up Now\" on the right side of the page.     You will be asked to enter the access code listed below, as well as some personal information. Please follow the directions to create your username and password.     Your access code is: [email or web address]  Expires: 2018  9:31 AM     Your access code will  in 90 days. If you need help or a new code, please call your [facility] clinic or [phone].        Care EveryWhere ID     This is your Care EveryWhere ID. This could be used by other organizations to access your [facility] medical records  [id number]        Your Vitals Were     Pulse Height Pulse Oximetry BMI (Body Mass Index)          75 5' 8\" (1.727 m) 98% 24.4 kg/m2         Blood Pressure from Last 3 Encounters:   17 152/67   17 147/67   17 136/71    Weight from Last 3 Encounters:   17 160 lb 8 oz (72.8 kg)   17 162 lb (73.5 kg)   17 160 lb (72.6 kg)              We Performed the Following     GASTROENTEROLOGY ADULT REF CONSULT ONLY          Today's Medication Changes          These changes are accurate as of: 17  9:31 AM.  If you have any questions, ask your nurse or doctor.               Start taking these medicines.        Dose/Directions    polyethylene glycol powder   Commonly known as:  MIRALAX   Used for:  Gastrointestinal symptom   Started by:  Angela Gallagher MD        Dose:  1 capful   Take 17 g (1 capful) by mouth daily   Quantity:  510 g   Refills:  1            Where to get your medicines      These medications were sent to [facility] [address] [place] [address]     Phone:  " [phone]     polyethylene glycol powder                Primary Care Provider Office Phone # Fax #    Chandrika Cordova [phone]       [facility] [facility] [address]        Equal Access to Services     MARYBETH SAN : Hadii aad ku hadamyjuan Soomaali, waaxda luqadaha, qaybta kaalmada adeegyada, familia rebeccain hayaateri waite martarupa olivas. So [facility] [phone].    ATENCIÓN: Si habla español, tiene a torres disposición servicios gratuitos de asistencia lingüística. Llame al [phone].    We comply with applicable federal civil rights laws and [place] laws. We do not discriminate on the basis of race, color, national origin, age, disability, sex, sexual orientation, or gender identity.            Thank you!     Thank you for choosing [facility]  for your care. Our goal is always to provide you with excellent care. Hearing back from our patients is one way we can continue to improve our services. Please take a few minutes to complete the written survey that you may receive in the mail after your visit with us. Thank you!             Your Updated Medication List - Protect others around you: Learn how to safely use, store and throw away your medicines at www.disposemymeds.org.          This list is accurate as of: 11/29/17  9:31 AM.  Always use your most recent med list.                   Brand Name Dispense Instructions for use Diagnosis    ALEVE 220 MG tablet   Generic drug:  naproxen sodium      Take 220 mg by mouth nightly as needed for moderate pain        REINALDO SEED PO      Take 1 teaspoonful by mouth daily        cholecalciferol 43662 UNITS capsule    vitamin D3     Take 1 capsule by mouth daily.        co-enzyme Q-10 100 MG Caps capsule      Take  by mouth daily.        cyanocolbalamin 500 MCG tablet    vitamin  B-12     Take 500 mcg by mouth daily    Diarrhea, unspecified type       Garlic 2000 MG Caps      Take  by mouth.        Ginger Root Powd      Take 1 Dose by  mouth daily        lisinopril 10 MG tablet    PRINIVIL/ZESTRIL    90 tablet    Take 1 tablet (10 mg) by mouth daily    Hypertension goal BP (blood pressure) < 140/90       magnesium 100 MG Caps      Take  by mouth.        polyethylene glycol powder    MIRALAX    510 g    Take 17 g (1 capful) by mouth daily    Gastrointestinal symptom       pravastatin 10 MG tablet    PRAVACHOL    90 tablet    Take 1 tablet (10 mg) by mouth daily    Hyperlipidemia LDL goal <130       RED YEAST RICE PO           UNABLE TO FIND      daily MEDICATION NAME: Super Vitamin B    Diarrhea, unspecified type

## 2017-11-29 NOTE — PROGRESS NOTES
SUBJECTIVE:   Bambi Feldman is a 89 year old male who presents to clinic today for the following health issues:    Concern - vomiting and diarrhea  Onset: 3 months    Description:   Patient states that he has had 4 episodes where he will throw up 4-5 times in a row followed by diarrhea.     Intensity: severe    Progression of Symptoms:  Unsure, seems to happen every month    Accompanying Signs & Symptoms:  Says that once he noticed some blood in his vomit.    Previous history of similar problem:   none    Precipitating factors:   Worsened by: possibly by taking his pain pills    Alleviating factors:  Improved by: none    Therapies Tried and outcome: none      He has been having vomiting on and off since August 2017. He had it 4 times since August 2017.   He was seen by provider up [place] and pill was given that helped to stop throwing up.   His vomiting was accompanied by diarrhea. That resolved in a day or so.     His symptoms of diarrhea and vomiting come every 6 weeks, lasts for a day or 2 and resolve on its own.     This episode started 2 days ago. He thought he had burp and instead of burping he threw up. No blood in vomitus.   He threw up for 4-5 times on Monday, Tuesday that is yesterday he did not have vomiting. No diarrhea yesterday.    no vomiting or nausea since this morning. No diarrhea today. He ate egg and bread this morning and could keep it down.   Last BM was on Monday: had diarrhea w/o blood or mucus.     No abd pain. No fever. No recent use of antibiotics.     He has had colonoscopies in past, it was good.     Per pt he was seen by [facility] last year and was put on some pill to help him relax.   He does not take that pill anymore.   He also mentioned about having to push hard to get his bowels emptied even if the stools are not hard.       Problem list and histories reviewed & adjusted, as indicated.  Additional history: as documented    Patient Active Problem List   Diagnosis     Hypertension goal  BP (blood pressure) < 140/90     Other malignant neoplasm of skin, site unspecified     OA (osteoarthritis) of knee     HYPERLIPIDEMIA LDL GOAL <130     Advanced directives, counseling/discussion     Elevated serum creatinine     Primary osteoarthritis of both knees     Trochanteric bursitis of right hip     Past Surgical History:   Procedure Laterality Date     SUPRAPUBIC PROSTATECTOMY       SURGICAL HISTORY OF -       skin cancer right neck and lip       Social History   Substance Use Topics     Smoking status: Former Smoker     Quit date: 1/20/1970     Smokeless tobacco: Never Used     Alcohol use Yes      Comment: 1 drink per night      Family History   Problem Relation Age of Onset     Alzheimer Disease Sister      C.A.D. No family hx of      DIABETES No family hx of      Hypertension No family hx of      CEREBROVASCULAR DISEASE No family hx of      Breast Cancer No family hx of      Cancer - colorectal No family hx of      Prostate Cancer No family hx of          Current Outpatient Prescriptions   Medication Sig Dispense Refill     polyethylene glycol (MIRALAX) powder Take 17 g (1 capful) by mouth daily 510 g 1     lisinopril (PRINIVIL/ZESTRIL) 10 MG tablet Take 1 tablet (10 mg) by mouth daily 90 tablet 3     pravastatin (PRAVACHOL) 10 MG tablet Take 1 tablet (10 mg) by mouth daily 90 tablet 3     cyanocolbalamin (VITAMIN  B-12) 500 MCG tablet Take 500 mcg by mouth daily       UNABLE TO FIND daily MEDICATION NAME: Super Vitamin B       REINALDO SEED PO Take 1 teaspoonful by mouth daily       Ginger Root POWD Take 1 Dose by mouth daily       naproxen sodium (ALEVE) 220 MG tablet Take 220 mg by mouth nightly as needed for moderate pain       Red Yeast Rice Extract (RED YEAST RICE PO)        Magnesium 100 MG CAPS Take  by mouth.       Garlic 2000 MG CAPS Take  by mouth.       co-enzyme Q-10 (COQ10) 100 MG CAPS Take  by mouth daily.       cholecalciferol (VITAMIN D3) 25887 UNIT capsule Take 1 capsule by mouth daily.    "    BP Readings from Last 3 Encounters:   11/29/17 152/67   09/08/17 147/67   08/03/17 136/71    Wt Readings from Last 3 Encounters:   11/29/17 160 lb 8 oz (72.8 kg)   09/08/17 162 lb (73.5 kg)   08/03/17 160 lb (72.6 kg)                  Labs reviewed in EPIC        Reviewed and updated as needed this visit by clinical staffTobacco  Allergies  Meds  Med Hx  Surg Hx  Fam Hx  Soc Hx      Reviewed and updated as needed this visit by Provider         ROS:  Constitutional, HEENT, cardiovascular, pulmonary, gi and gu systems are negative, except as otherwise noted.      OBJECTIVE:   /67 (Cuff Size: Adult Regular)  Pulse 75  Ht 5' 8\" (1.727 m)  Wt 160 lb 8 oz (72.8 kg)  SpO2 98%  BMI 24.4 kg/m2  Body mass index is 24.4 kg/(m^2).  GENERAL: healthy, alert and no distress  NECK: no adenopathy, no asymmetry, masses, or scars and thyroid normal to palpation  RESP: lungs clear to auscultation - no rales, rhonchi or wheezes  CV: regular rate and rhythm, normal S1 S2, no S3 or S4, no murmur, click or rub, no peripheral edema and peripheral pulses strong  ABDOMEN: soft, nontender, no hepatosplenomegaly, no masses and bowel sounds normal  MS: no gross musculoskeletal defects noted, no edema    ASSESSMENT/PLAN:         ICD-10-CM    1. Gastrointestinal symptom R19.8 polyethylene glycol (MIRALAX) powder     GASTROENTEROLOGY ADULT REF CONSULT ONLY   2. Hypertension goal BP (blood pressure) < 140/90 I10      Pt's symptoms have resolved today. His abd exam is benign.   Intermittent abdominal symptoms: may be changes in food, constipation, self limiting mild gastroenteritis.   If possible keep log of symptoms, diet, BMs.   He takes OTC stool softner every 1-2 days. Miralax added. Adjust dose as per stool consistency.  He has problems emptying his bowels completely. Could be age related, other causes cannot be excluded. For this I have referred him to Gastroenterology.     His systolic BP was higher than 140, did not get " chance to recheck at the end of visit. Monitor during subsequent visits.     Angela Gallagher MD  [facility]

## 2017-12-19 ENCOUNTER — OFFICE VISIT (OUTPATIENT)
Dept: ORTHOPEDICS | Facility: CLINIC | Age: 82
End: 2017-12-19
Payer: COMMERCIAL

## 2017-12-19 VITALS — RESPIRATION RATE: 18 BRPM | WEIGHT: 160 LBS | HEIGHT: 68 IN | BODY MASS INDEX: 24.25 KG/M2 | TEMPERATURE: 97 F

## 2017-12-19 DIAGNOSIS — M17.0 PRIMARY OSTEOARTHRITIS OF BOTH KNEES: Primary | ICD-10-CM

## 2017-12-19 PROCEDURE — 20610 DRAIN/INJ JOINT/BURSA W/O US: CPT | Mod: 50 | Performed by: ORTHOPAEDIC SURGERY

## 2017-12-19 NOTE — PROGRESS NOTES
The patient's left and right knees were prepped with betadine solution after verification of allergies. Area approximately 10 cm x 10 cm prepped in a sterile fashion. After injection, betadine removed with soap and water and band-aids applied.    6 mL (48 mg) Synvisc-One was injected as directed into each knee (for a total of 12 mL Synvisc-One) after local anesthetic by Dr. Childers Product # [address]-3 Lot# [id number] exp. 09/30/2019    VALE CorralesC  Supervising physician: Juliano Childers MD  Dept. of Orthopedics  [facility]

## 2017-12-19 NOTE — LETTER
12/19/2017         RE: Bambi Feldman  [address] [facility] [address]        Dear Colleague,    Thank you for referring your patient, Bambi Feldman, to the [facility]. Please see a copy of my visit note below.    The patient's left and right knees were prepped with betadine solution after verification of allergies. Area approximately 10 cm x 10 cm prepped in a sterile fashion. After injection, betadine removed with soap and water and band-aids applied.    6 mL (48 mg) Synvisc-One was injected as directed into each knee (for a total of 12 mL Synvisc-One) after local anesthetic by Dr. Childers Product # [address]-3 Lot# [id number] exp. 09/30/2019    VALE CorralesC  Supervising physician: Juliano Childers MD  Dept. of Orthopedics  [facility]          Follow up bilateral knee osteoarthritis.  X-ray on 1/24/17 shows severe osteoarthritis with medial wear.  Has had prior treatment with Symptom treatment  Activity modification  Steroid injection last 1/24/17  NSAID use: naproxen (Anaprox, Naprosyn, Naprelan)  Physical Therapy: No  Pain limits him with walking, ambulating stairs and kneeling, bending, sleeping.  Range of motion 0-130 degrees..    Tenderness at medial knee.  Prior Synvisc injection? Yes, 6/5/17 worked for several months.  Any other joint disease such as rheumatoid arthritis or psoriatic arthritis?  No    Assessment:  Bilateral knee osteoarthritis.  He  desires Synvisc injection today of bilateral knee(s).  Risks, benefits, potential complications and alternatives were discussed.   With the patient's consent, sterile prep was performed of bilateral knee(s).  Each knee was injected with 6 cc Synvisc-One after lidocaine infiltration of the needle tract at anterolateral site.  Return to clinic as needed.         Again, thank you for allowing me to participate in the care of your patient.        Sincerely,        Juliano Childers MD

## 2017-12-19 NOTE — MR AVS SNAPSHOT
"              After Visit Summary   2017    Bambi Feldman    MRN: [id number]           Patient Information     Date Of Birth          1927        Visit Information        Provider Department      2017 2:00 PM Juliano Childers MD [facility]        Today's Diagnoses     Primary osteoarthritis of both knees    -  1       Follow-ups after your visit        Who to contact     If you have questions or need follow up information about today's clinic visit or your schedule please contact [facility] directly at [phone].  Normal or non-critical lab and imaging results will be communicated to you by [employer], letter or phone within 4 business days after the clinic has received the results. If you do not hear from us within 7 days, please contact the clinic through [employer] or phone. If you have a critical or abnormal lab result, we will notify you by phone as soon as possible.  Submit refill requests through [employer] or call your pharmacy and they will forward the refill request to us. Please allow 3 business days for your refill to be completed.          Additional Information About Your Visit        MyChart Information     [employer] lets you send messages to your doctor, view your test results, renew your prescriptions, schedule appointments and more. To sign up, go to [email or web address] . Click on \"Log in\" on the left side of the screen, which will take you to the Welcome page. Then click on \"Sign up Now\" on the right side of the page.     You will be asked to enter the access code listed below, as well as some personal information. Please follow the directions to create your username and password.     Your access code is: [email or web address]  Expires: 2018  9:31 AM     Your access code will  in 90 days. If you need help or a new code, please call your [facility] or [phone].        Care EveryWhere ID     This is your Care EveryWhere ID. " "This could be used by other organizations to access your [place] medical records  [id number]        Your Vitals Were     Temperature Respirations Height BMI (Body Mass Index)          97  F (36.1  C) 18 1.727 m (5' 8\") 24.33 kg/m2         Blood Pressure from Last 3 Encounters:   11/29/17 152/67   09/08/17 147/67   08/03/17 136/71    Weight from Last 3 Encounters:   12/19/17 72.6 kg (160 lb)   11/29/17 72.8 kg (160 lb 8 oz)   09/08/17 73.5 kg (162 lb)              We Performed the Following     C SYNVISC PER 1 MG, 48 units     DRAIN/INJECT LARGE JOINT/BURSA          Today's Medication Changes          These changes are accurate as of: 12/19/17  5:26 PM.  If you have any questions, ask your nurse or doctor.               Start taking these medicines.        Dose/Directions    Hylan 48 MG/6ML Sosy injection   Used for:  Primary osteoarthritis of both knees   Started by:  Juliano Childers MD        Dose:  12 mL   12 mLs by INTRA-ARTICULAR route once for 1 dose   Quantity:  12 mL   Refills:  0            Where to get your medicines      Some of these will need a paper prescription and others can be bought over the counter.  Ask your nurse if you have questions.     You don't need a prescription for these medications     Hylan 48 MG/6ML Sosy injection                Primary Care Provider Office Phone # Fax #    Chandrika Cordova [phone]       [address] [place] [address]        Equal Access to Services     MARYBETH SAN AH: Hadii alia marroquino Soionaali, waaxda luqadaha, qaybta kaalmada adeegyada, waxesther temi olivas. So [facility] [phone].    ATENCIÓN: Si habla español, tiene a torres disposición servicios gratuitos de asistencia lingüística. Llame al [phone].    We comply with applicable federal civil rights laws and [place] laws. We do not discriminate on the basis of race, color, national origin, age, disability, sex, sexual orientation, or gender " identity.            Thank you!     Thank you for choosing [facility]  for your care. Our goal is always to provide you with excellent care. Hearing back from our patients is one way we can continue to improve our services. Please take a few minutes to complete the written survey that you may receive in the mail after your visit with us. Thank you!             Your Updated Medication List - Protect others around you: Learn how to safely use, store and throw away your medicines at www.disposemymeds.org.          This list is accurate as of: 12/19/17  5:26 PM.  Always use your most recent med list.                   Brand Name Dispense Instructions for use Diagnosis    ALEVE 220 MG tablet   Generic drug:  naproxen sodium      Take 220 mg by mouth nightly as needed for moderate pain        REINALDO SEED PO      Take 1 teaspoonful by mouth daily        cholecalciferol 66635 UNITS capsule    vitamin D3     Take 1 capsule by mouth daily.        co-enzyme Q-10 100 MG Caps capsule      Take  by mouth daily.        cyanocolbalamin 500 MCG tablet    vitamin  B-12     Take 500 mcg by mouth daily    Diarrhea, unspecified type       Garlic 2000 MG Caps      Take  by mouth.        Ginger Root Powd      Take 1 Dose by mouth daily        Hylan 48 MG/6ML Sosy injection     12 mL    12 mLs by INTRA-ARTICULAR route once for 1 dose    Primary osteoarthritis of both knees       lisinopril 10 MG tablet    PRINIVIL/ZESTRIL    90 tablet    Take 1 tablet (10 mg) by mouth daily    Hypertension goal BP (blood pressure) < 140/90       magnesium 100 MG Caps      Take  by mouth.        polyethylene glycol powder    MIRALAX    510 g    Take 17 g (1 capful) by mouth daily    Gastrointestinal symptom       pravastatin 10 MG tablet    PRAVACHOL    90 tablet    Take 1 tablet (10 mg) by mouth daily    Hyperlipidemia LDL goal <130       RED YEAST RICE PO           UNABLE TO FIND      daily MEDICATION NAME: Super Vitamin B     Diarrhea, unspecified type

## 2017-12-19 NOTE — NURSING NOTE
"Chief Complaint   Patient presents with     RECHECK     Bilateral knee OA last injection Synvisc 6/5/17.       Initial Temp 97  F (36.1  C)  Resp 18  Ht 1.727 m (5' 8\")  Wt 72.6 kg (160 lb)  BMI 24.33 kg/m2 Estimated body mass index is 24.33 kg/(m^2) as calculated from the following:    Height as of this encounter: 1.727 m (5' 8\").    Weight as of this encounter: 72.6 kg (160 lb).  Medication Reconciliation: complete   Sarah William MA      "

## 2017-12-19 NOTE — PROGRESS NOTES
Follow up bilateral knee osteoarthritis.  X-ray on 1/24/17 shows severe osteoarthritis with medial wear.  Has had prior treatment with Symptom treatment  Activity modification  Steroid injection last 1/24/17  NSAID use: naproxen (Anaprox, Naprosyn, Naprelan)  Physical Therapy: No  Pain limits him with walking, ambulating stairs and kneeling, bending, sleeping.  Range of motion 0-130 degrees..    Tenderness at medial knee.  Prior Synvisc injection? Yes, 6/5/17 worked for several months.  Any other joint disease such as rheumatoid arthritis or psoriatic arthritis?  No    Assessment:  Bilateral knee osteoarthritis.  He  desires Synvisc injection today of bilateral knee(s).  Risks, benefits, potential complications and alternatives were discussed.   With the patient's consent, sterile prep was performed of bilateral knee(s).  Each knee was injected with 6 cc Synvisc-One after lidocaine infiltration of the needle tract at anterolateral site.  Return to clinic as needed.

## 2018-02-26 ENCOUNTER — OFFICE VISIT (OUTPATIENT)
Dept: FAMILY MEDICINE | Facility: CLINIC | Age: 83
End: 2018-02-26
Payer: COMMERCIAL

## 2018-02-26 VITALS
BODY MASS INDEX: 25.58 KG/M2 | OXYGEN SATURATION: 98 % | DIASTOLIC BLOOD PRESSURE: 65 MMHG | HEIGHT: 67 IN | SYSTOLIC BLOOD PRESSURE: 144 MMHG | HEART RATE: 77 BPM | TEMPERATURE: 96.7 F | WEIGHT: 163 LBS

## 2018-02-26 DIAGNOSIS — R19.8 GASTROINTESTINAL SYMPTOM: ICD-10-CM

## 2018-02-26 DIAGNOSIS — E78.5 HYPERLIPIDEMIA LDL GOAL <130: ICD-10-CM

## 2018-02-26 DIAGNOSIS — N18.30 CHRONIC KIDNEY DISEASE, STAGE 3 (MODERATE): ICD-10-CM

## 2018-02-26 DIAGNOSIS — Z63.6 CAREGIVER STRESS: ICD-10-CM

## 2018-02-26 DIAGNOSIS — I10 HYPERTENSION GOAL BP (BLOOD PRESSURE) < 140/90: Primary | ICD-10-CM

## 2018-02-26 DIAGNOSIS — F51.01 PRIMARY INSOMNIA: ICD-10-CM

## 2018-02-26 DIAGNOSIS — M67.80 CYST OF TENDON SHEATH: ICD-10-CM

## 2018-02-26 DIAGNOSIS — M17.0 PRIMARY OSTEOARTHRITIS OF BOTH KNEES: ICD-10-CM

## 2018-02-26 PROCEDURE — 99214 OFFICE O/P EST MOD 30 MIN: CPT | Performed by: INTERNAL MEDICINE

## 2018-02-26 RX ORDER — POLYETHYLENE GLYCOL 3350 17 G/17G
1 POWDER, FOR SOLUTION ORAL DAILY
Qty: 510 G | Refills: 3 | Status: SHIPPED | OUTPATIENT
Start: 2018-02-26 | End: 2020-02-04

## 2018-02-26 RX ORDER — ESCITALOPRAM OXALATE 10 MG/1
5 TABLET ORAL DAILY
Qty: 45 TABLET | Refills: 1 | Status: SHIPPED | OUTPATIENT
Start: 2018-02-26 | End: 2020-01-21

## 2018-02-26 SDOH — SOCIAL STABILITY - SOCIAL INSECURITY: DEPENDENT RELATIVE NEEDING CARE AT HOME: Z63.6

## 2018-02-26 NOTE — NURSING NOTE
"Chief Complaint   Patient presents with     Lipids     Hypertension     Mass     lump on left arm        Initial /65 (BP Location: Right arm, Patient Position: Sitting, Cuff Size: Adult Regular)  Pulse 77  Temp 96.7  F (35.9  C) (Oral)  Ht 5' 7\" (1.702 m)  Wt 163 lb (73.9 kg)  SpO2 98%  BMI 25.53 kg/m2 Estimated body mass index is 25.53 kg/(m^2) as calculated from the following:    Height as of this encounter: 5' 7\" (1.702 m).    Weight as of this encounter: 163 lb (73.9 kg).  Medication Reconciliation: complete   Candida Lennon ma      "

## 2018-02-26 NOTE — MR AVS SNAPSHOT
"              After Visit Summary   2/26/2018    Bambi Feldman    MRN: [id number]           Patient Information     Date Of Birth          12/23/1927        Visit Information        Provider Department      2/26/2018 8:20 AM Jessa Alston MD [facility]        Today's Diagnoses     Hypertension goal BP (blood pressure) < 140/90    -  1    Hyperlipidemia LDL goal <130        Chronic kidney disease, stage 3 (moderate)        Primary osteoarthritis of both knees        Gastrointestinal symptom        Primary insomnia        Caregiver stress        Cyst of tendon sheath          Care Instructions    Try a month off of the pravastatin and observe aches and pains.      See Dr Childers re: tendon cyst      Return to clinic about 6 months, recheck labs and medications.   (August or Sept '18)          Follow-ups after your visit        Follow-up notes from your care team     Return in about 6 months (around 8/26/2018).      Who to contact     If you have questions or need follow up information about today's clinic visit or your schedule please contact [facility] directly at [phone].  Normal or non-critical lab and imaging results will be communicated to you by [employer], letter or phone within 4 business days after the clinic has received the results. If you do not hear from us within 7 days, please contact the clinic through [employer] or phone. If you have a critical or abnormal lab result, we will notify you by phone as soon as possible.  Submit refill requests through "[employer]" or call your pharmacy and they will forward the refill request to us. Please allow 3 business days for your refill to be completed.          Additional Information About Your Visit        [employer] Information     "[employer]" lets you send messages to your doctor, view your test results, renew your prescriptions, schedule appointments and more. To sign up, go to [email or web address]" . Click on \"Log " "in\" on the left side of the screen, which will take you to the Welcome page. Then click on \"Sign up Now\" on the right side of the page.     You will be asked to enter the access code listed below, as well as some personal information. Please follow the directions to create your username and password.     Your access code is: [email or web address]  Expires: 2018  9:31 AM     Your access code will  in 90 days. If you need help or a new code, please call your [place] clinic or [phone].        Care EveryWhere ID     This is your Care EveryWhere ID. This could be used by other organizations to access your [place] medical records  [id number]        Your Vitals Were     Pulse Temperature Height Pulse Oximetry BMI (Body Mass Index)       77 96.7  F (35.9  C) (Oral) 5' 7\" (1.702 m) 98% 25.53 kg/m2        Blood Pressure from Last 3 Encounters:   18 144/65   17 152/67   17 147/67    Weight from Last 3 Encounters:   18 163 lb (73.9 kg)   17 160 lb (72.6 kg)   17 160 lb 8 oz (72.8 kg)              We Performed the Following     ALT     AST     Basic metabolic panel     Lipid panel reflex to direct LDL Fasting          Today's Medication Changes          These changes are accurate as of 18  9:07 AM.  If you have any questions, ask your nurse or doctor.               Start taking these medicines.        Dose/Directions    escitalopram 10 MG tablet   Commonly known as:  LEXAPRO   Used for:  Primary insomnia, Caregiver stress   Started by:  Jessa Alston MD        Dose:  5 mg   Take 0.5 tablets (5 mg) by mouth daily   Quantity:  45 tablet   Refills:  1            Where to get your medicines      These medications were sent to [facility] [address] [place] [address]     Phone:  [phone]     escitalopram 10 MG tablet    polyethylene glycol powder                Primary Care Provider Office Phone # Fax #    " Chandrika Art,  [phone]       [address] [facility] [address]        Equal Access to Services     MARYBETH SAN : Frandy Mendoza, wateja jonestyha, maddiehoward kagabbyda mauramoo, familia rebeccain hayaateri lorenzoflako obrien heaven olivas. So [facility] [phone].    ATENCIÓN: Si habla español, tiene a torres disposición servicios gratuitos de asistencia lingüística. Llame al [phone].    We comply with applicable federal civil rights laws and [place] laws. We do not discriminate on the basis of race, color, national origin, age, disability, sex, sexual orientation, or gender identity.            Thank you!     Thank you for choosing [facility]  for your care. Our goal is always to provide you with excellent care. Hearing back from our patients is one way we can continue to improve our services. Please take a few minutes to complete the written survey that you may receive in the mail after your visit with us. Thank you!             Your Updated Medication List - Protect others around you: Learn how to safely use, store and throw away your medicines at www.disposemymeds.org.          This list is accurate as of 2/26/18  9:07 AM.  Always use your most recent med list.                   Brand Name Dispense Instructions for use Diagnosis    ALEVE 220 MG tablet   Generic drug:  naproxen sodium      Take 220 mg by mouth nightly as needed for moderate pain        REINALDO SEED PO      Take 1 teaspoonful by mouth daily        cholecalciferol 24446 UNITS capsule    vitamin D3     Take 1 capsule by mouth daily.        co-enzyme Q-10 100 MG Caps capsule      Take  by mouth daily.        cyanocolbalamin 500 MCG tablet    vitamin  B-12     Take 500 mcg by mouth daily    Diarrhea, unspecified type       escitalopram 10 MG tablet    LEXAPRO    45 tablet    Take 0.5 tablets (5 mg) by mouth daily    Primary insomnia, Caregiver stress       Garlic 2000 MG Caps      Take  by mouth.        Ginger  Root Powd      Take 1 Dose by mouth daily        lisinopril 10 MG tablet    PRINIVIL/ZESTRIL    90 tablet    Take 1 tablet (10 mg) by mouth daily    Hypertension goal BP (blood pressure) < 140/90       magnesium 100 MG Caps      Take  by mouth.        polyethylene glycol powder    MIRALAX    510 g    Take 17 g (1 capful) by mouth daily    Gastrointestinal symptom       pravastatin 10 MG tablet    PRAVACHOL    90 tablet    Take 1 tablet (10 mg) by mouth daily    Hyperlipidemia LDL goal <130       RED YEAST RICE PO           UNABLE TO FIND      daily MEDICATION NAME: Super Vitamin B    Diarrhea, unspecified type

## 2018-02-26 NOTE — PROGRESS NOTES
SUBJECTIVE:   Bambi Feldman is a 89 year old male who presents to clinic today for the following health issues:    89 y/o M here for BP recheck and also to check a lump on his L leg.   He lives in his home and looks after his daughter ([employer]).    The miralax has really helped his bowel complaints  Lexapro really helps with racing thoughts / anxiety / insomnia  ( caregiver stress)   Rx originated from [facility]    He complains of a lump on left wrist x 2 months       Hyperlipidemia Follow-Up      Rate your low fat/cholesterol diet?: good    Taking statin?  Yes, no muscle aches from statin    Other lipid medications/supplements?:  none    Hypertension Follow-up      Outpatient blood pressures are not being checked.    Low Salt Diet: low salt      Amount of exercise or physical activity: 2-3 days/week for an average of 15-30 minutes    Problems taking medications regularly: No    Medication side effects: none    Diet: regular (no restrictions)            Problem list and histories reviewed & adjusted, as indicated.  Additional history: as documented    Patient Active Problem List   Diagnosis     Hypertension goal BP (blood pressure) < 140/90     Other malignant neoplasm of skin, site unspecified     OA (osteoarthritis) of knee     HYPERLIPIDEMIA LDL GOAL <130     Advanced directives, counseling/discussion     Elevated serum creatinine     Primary osteoarthritis of both knees     Trochanteric bursitis of right hip     Chronic kidney disease, stage 3 (moderate)     Past Surgical History:   Procedure Laterality Date     SUPRAPUBIC PROSTATECTOMY       SURGICAL HISTORY OF -       skin cancer right neck and lip       Social History   Substance Use Topics     Smoking status: Former Smoker     Quit date: 1/20/1970     Smokeless tobacco: Never Used     Alcohol use Yes      Comment: 1 drink per night      Family History   Problem Relation Age of Onset     Alzheimer Disease Sister      C.A.D. No family hx of      DIABETES No family  hx of      Hypertension No family hx of      CEREBROVASCULAR DISEASE No family hx of      Breast Cancer No family hx of      Cancer - colorectal No family hx of      Prostate Cancer No family hx of          Current Outpatient Prescriptions   Medication Sig Dispense Refill     polyethylene glycol (MIRALAX) powder Take 17 g (1 capful) by mouth daily 510 g 3     escitalopram (LEXAPRO) 10 MG tablet Take 0.5 tablets (5 mg) by mouth daily 45 tablet 1     lisinopril (PRINIVIL/ZESTRIL) 10 MG tablet Take 1 tablet (10 mg) by mouth daily 90 tablet 3     pravastatin (PRAVACHOL) 10 MG tablet Take 1 tablet (10 mg) by mouth daily 90 tablet 3     cyanocolbalamin (VITAMIN  B-12) 500 MCG tablet Take 500 mcg by mouth daily       UNABLE TO FIND daily MEDICATION NAME: Super Vitamin B       REINALDO SEED PO Take 1 teaspoonful by mouth daily       Ginger Root POWD Take 1 Dose by mouth daily       naproxen sodium (ALEVE) 220 MG tablet Take 220 mg by mouth nightly as needed for moderate pain       Red Yeast Rice Extract (RED YEAST RICE PO)        Magnesium 100 MG CAPS Take  by mouth.       Garlic 2000 MG CAPS Take  by mouth.       co-enzyme Q-10 (COQ10) 100 MG CAPS Take  by mouth daily.       cholecalciferol (VITAMIN D3) 30083 UNIT capsule Take 1 capsule by mouth daily.       Allergies   Allergen Reactions     Nkda [No Known Drug Allergies]      Recent Labs   Lab Test  08/03/17   0916 07/31/17  03/10/16   1343  07/13/15   1417  01/19/15   1301   LDL   --    --   128*  165*   --    ALT  40   --    --    --    --    CR  1.11  1.57*  1.28*  1.30*   --    GFRESTIMATED  62  44  53*  52*   --    GFRESTBLACK  75   --   64  63   --    POTASSIUM  4.7   --   4.4  4.6   --    TSH   --   3.460   --    --   2.68      BP Readings from Last 3 Encounters:   02/26/18 144/65   11/29/17 152/67   09/08/17 147/67    Wt Readings from Last 3 Encounters:   02/26/18 163 lb (73.9 kg)   12/19/17 160 lb (72.6 kg)   11/29/17 160 lb 8 oz (72.8 kg)                  Labs  "reviewed in EPIC    Reviewed and updated as needed this visit by clinical staff  Tobacco  Allergies  Meds  Med Hx  Surg Hx  Fam Hx  Soc Hx      Reviewed and updated as needed this visit by Provider         ROS:  Constitutional, HEENT, cardiovascular (shoveled snow without problems) and , pulmonary (no cough), gi and gu systems are negative, except as otherwise noted.  Severe dejenerative joint arthritis in knees slows him down a bit    OBJECTIVE:     /65 (BP Location: Right arm, Patient Position: Sitting, Cuff Size: Adult Regular)  Pulse 77  Temp 96.7  F (35.9  C) (Oral)  Ht 5' 7\" (1.702 m)  Wt 163 lb (73.9 kg)  SpO2 98%  BMI 25.53 kg/m2  Body mass index is 25.53 kg/(m^2).  GENERAL: healthy, alert and no distress  EYES: Eyes grossly normal to inspection, PERRL and conjunctivae and sclerae normal  NECK: no adenopathy, no asymmetry, masses, or scars and thyroid normal to palpation  RESP: lungs clear to auscultation - no rales, rhonchi or wheezes  CV: regular rate and rhythm, normal S1 S2, no S3 or S4, soft early systolic murmur along sternum, no click or rub, no peripheral edema and peripheral pulses strong  ABDOMEN: soft, nontender, no hepatosplenomegaly, no masses and bowel sounds normal  MS: no gross musculoskeletal defects noted, no edema  MS: large tendon cyst left dorsal wrist, the size of a large grape.    SKIN: no suspicious lesions or rashes  NEURO: Normal strength and tone for age, mentation intact and speech normal  PSYCH: mentation appears normal, affect normal/bright    Diagnostic Test Results:  Results for orders placed or performed in visit on 08/03/17   CBC with platelets and differential   Result Value Ref Range    WBC 6.5 4.0 - 11.0 10e9/L    RBC Count 4.41 4.4 - 5.9 10e12/L    Hemoglobin 13.6 13.3 - 17.7 g/dL    Hematocrit 40.5 40.0 - 53.0 %    MCV 92 78 - 100 fl    MCH 30.8 26.5 - 33.0 pg    MCHC 33.6 31.5 - 36.5 g/dL    RDW 13.8 10.0 - 15.0 %    Platelet Count 264 150 - 450 " 10e9/L    Diff Method Automated Method     % Neutrophils 53.7 %    % Lymphocytes 33.1 %    % Monocytes 10.9 %    % Eosinophils 1.2 %    % Basophils 1.1 %    Absolute Neutrophil 3.5 1.6 - 8.3 10e9/L    Absolute Lymphocytes 2.2 0.8 - 5.3 10e9/L    Absolute Monocytes 0.7 0.0 - 1.3 10e9/L    Absolute Eosinophils 0.1 0.0 - 0.7 10e9/L    Absolute Basophils 0.1 0.0 - 0.2 10e9/L   Comprehensive metabolic panel (BMP + Alb, Alk Phos, ALT, AST, Total. Bili, TP)   Result Value Ref Range    Sodium 137 133 - 144 mmol/L    Potassium 4.7 3.4 - 5.3 mmol/L    Chloride 103 94 - 109 mmol/L    Carbon Dioxide 26 20 - 32 mmol/L    Anion Gap 8 3 - 14 mmol/L    Glucose 105 (H) 70 - 99 mg/dL    Urea Nitrogen 21 7 - 30 mg/dL    Creatinine 1.11 0.66 - 1.25 mg/dL    GFR Estimate 62 >60 mL/min/1.7m2    GFR Estimate If Black 75 >60 mL/min/1.7m2    Calcium 9.0 8.5 - 10.1 mg/dL    Bilirubin Total 0.6 0.2 - 1.3 mg/dL    Albumin 3.4 3.4 - 5.0 g/dL    Protein Total 7.0 6.8 - 8.8 g/dL    Alkaline Phosphatase 96 40 - 150 U/L    ALT 40 0 - 70 U/L    AST 22 0 - 45 U/L   Magnesium   Result Value Ref Range    Magnesium 1.9 1.6 - 2.3 mg/dL       ASSESSMENT/PLAN:        ICD-10-CM    1. Hypertension goal BP (blood pressure) < 140/90 I10 Basic metabolic panel  (Ca, Cl, CO2, Creat, Gluc, K, Na, BUN)     CANCELED: Basic metabolic panel   2. Hyperlipidemia LDL goal <130 E78.5 Lipid panel reflex to direct LDL Fasting     AST     ALT     CANCELED: Lipid panel reflex to direct LDL Fasting     CANCELED: AST     CANCELED: ALT   3. Chronic kidney disease, stage 3 (moderate) N18.3 Basic metabolic panel  (Ca, Cl, CO2, Creat, Gluc, K, Na, BUN)     CANCELED: Basic metabolic panel   4. Primary osteoarthritis of both knees M17.0    5. Gastrointestinal symptom R19.8 polyethylene glycol (MIRALAX) powder   6. Primary insomnia F51.01 escitalopram (LEXAPRO) 10 MG tablet   7. Caregiver stress Z63.6 escitalopram (LEXAPRO) 10 MG tablet   8. Cyst of tendon sheath M67.80        Patient  Instructions   Try a month off of the pravastatin and observe aches and pains.       see Dr Childers re: tendon cyst    Return to clinic 6 months    Jessa Alston MD  [facility]

## 2018-02-26 NOTE — PATIENT INSTRUCTIONS
Try a month off of the pravastatin and observe aches and pains.      See Dr Childers re: tendon cyst      Return to clinic about 6 months, recheck labs and medications.   (August or Sept '18)

## 2018-03-01 DIAGNOSIS — I10 HYPERTENSION GOAL BP (BLOOD PRESSURE) < 140/90: ICD-10-CM

## 2018-03-01 DIAGNOSIS — E78.5 HYPERLIPIDEMIA LDL GOAL <130: ICD-10-CM

## 2018-03-01 DIAGNOSIS — N18.30 CHRONIC KIDNEY DISEASE, STAGE 3 (MODERATE): ICD-10-CM

## 2018-03-01 LAB
ALT SERPL W P-5'-P-CCNC: 18 U/L (ref 0–70)
ANION GAP SERPL CALCULATED.3IONS-SCNC: 7 MMOL/L (ref 3–14)
AST SERPL W P-5'-P-CCNC: 16 U/L (ref 0–45)
BUN SERPL-MCNC: 15 MG/DL (ref 7–30)
CALCIUM SERPL-MCNC: 9.1 MG/DL (ref 8.5–10.1)
CHLORIDE SERPL-SCNC: 98 MMOL/L (ref 94–109)
CHOLEST SERPL-MCNC: 186 MG/DL
CO2 SERPL-SCNC: 27 MMOL/L (ref 20–32)
CREAT SERPL-MCNC: 1.11 MG/DL (ref 0.66–1.25)
GFR SERPL CREATININE-BSD FRML MDRD: 62 ML/MIN/1.7M2
GLUCOSE SERPL-MCNC: 101 MG/DL (ref 70–99)
HDLC SERPL-MCNC: 58 MG/DL
LDLC SERPL CALC-MCNC: 106 MG/DL
NONHDLC SERPL-MCNC: 128 MG/DL
POTASSIUM SERPL-SCNC: 4.7 MMOL/L (ref 3.4–5.3)
SODIUM SERPL-SCNC: 132 MMOL/L (ref 133–144)
TRIGL SERPL-MCNC: 108 MG/DL

## 2018-03-01 PROCEDURE — 84460 ALANINE AMINO (ALT) (SGPT): CPT | Performed by: INTERNAL MEDICINE

## 2018-03-01 PROCEDURE — 80048 BASIC METABOLIC PNL TOTAL CA: CPT | Performed by: INTERNAL MEDICINE

## 2018-03-01 PROCEDURE — 36415 COLL VENOUS BLD VENIPUNCTURE: CPT | Performed by: INTERNAL MEDICINE

## 2018-03-01 PROCEDURE — 80061 LIPID PANEL: CPT | Performed by: INTERNAL MEDICINE

## 2018-03-01 PROCEDURE — 84450 TRANSFERASE (AST) (SGOT): CPT | Performed by: INTERNAL MEDICINE

## 2018-03-01 NOTE — LETTER
[facility]  [address] [place]  [address]  [phone]        March 2, 2018    Bambi Feldman  [address] [facility] [address]        Dear Tyrone Lacy are your results.  Your labs are normal/stable at this time.     Please call  with any questions.  We can also discuss any questions regarding these labs at your next scheduled visit.    Results for orders placed or performed in visit on 03/01/18   Basic metabolic panel  (Ca, Cl, CO2, Creat, Gluc, K, Na, BUN)   Result Value Ref Range    Sodium 132 (L) 133 - 144 mmol/L    Potassium 4.7 3.4 - 5.3 mmol/L    Chloride 98 94 - 109 mmol/L    Carbon Dioxide 27 20 - 32 mmol/L    Anion Gap 7 3 - 14 mmol/L    Glucose 101 (H) 70 - 99 mg/dL    Urea Nitrogen 15 7 - 30 mg/dL    Creatinine 1.11 0.66 - 1.25 mg/dL    GFR Estimate 62 >60 mL/min/1.7m2    GFR Estimate If Black 75 >60 mL/min/1.7m2    Calcium 9.1 8.5 - 10.1 mg/dL   Lipid panel reflex to direct LDL Fasting   Result Value Ref Range    Cholesterol 186 <200 mg/dL    Triglycerides 108 <150 mg/dL    HDL Cholesterol 58 >39 mg/dL    LDL Cholesterol Calculated 106 (H) <100 mg/dL    Non HDL Cholesterol 128 <130 mg/dL   AST   Result Value Ref Range    AST 16 0 - 45 U/L   ALT   Result Value Ref Range    ALT 18 0 - 70 U/L         If you have any questions please call the clinic at [phone].    Sincerely,    Jessa WAITE

## 2018-03-01 NOTE — PROGRESS NOTES
Bambi Feldman    Enclosed are your results.  Your labs are normal/stable at this time.     Please call  with any questions.  We can also discuss any questions regarding these labs at your next scheduled visit.    Sincerely,    Jessa Alston M.D.

## 2018-03-20 ENCOUNTER — OFFICE VISIT (OUTPATIENT)
Dept: ORTHOPEDICS | Facility: CLINIC | Age: 83
End: 2018-03-20
Payer: COMMERCIAL

## 2018-03-20 VITALS — BODY MASS INDEX: 25.11 KG/M2 | RESPIRATION RATE: 18 BRPM | TEMPERATURE: 97 F | HEIGHT: 67 IN | WEIGHT: 160 LBS

## 2018-03-20 DIAGNOSIS — M67.432 GANGLION CYST OF WRIST, LEFT: Primary | ICD-10-CM

## 2018-03-20 PROCEDURE — 20612 ASPIRATE/INJ GANGLION CYST: CPT | Mod: LT | Performed by: ORTHOPAEDIC SURGERY

## 2018-03-20 PROCEDURE — 99203 OFFICE O/P NEW LOW 30 MIN: CPT | Mod: 25 | Performed by: ORTHOPAEDIC SURGERY

## 2018-03-20 NOTE — MR AVS SNAPSHOT
"              After Visit Summary   3/20/2018    Bambi Feldman    MRN: [id number]           Patient Information     Date Of Birth          1927        Visit Information        Provider Department      3/20/2018 2:00 PM Juliano Childers MD [facility]        Today's Diagnoses     Ganglion cyst of wrist, left    -  1      Care Instructions    If cyst returns, we may consider surgery.  Use tubigrip for 1 week.            Follow-ups after your visit        Who to contact     If you have questions or need follow up information about today's clinic visit or your schedule please contact [facility] directly at [phone].  Normal or non-critical lab and imaging results will be communicated to you by MyChart, letter or phone within 4 business days after the clinic has received the results. If you do not hear from us within 7 days, please contact the clinic through [facility] or phone. If you have a critical or abnormal lab result, we will notify you by phone as soon as possible.  Submit refill requests through [employer] or call your pharmacy and they will forward the refill request to us. Please allow 3 business days for your refill to be completed.          Additional Information About Your Visit        MyChart Information     [employer] lets you send messages to your doctor, view your test results, renew your prescriptions, schedule appointments and more. To sign up, go to [email or web address] . Click on \"Log in\" on the left side of the screen, which will take you to the Welcome page. Then click on \"Sign up Now\" on the right side of the page.     You will be asked to enter the access code listed below, as well as some personal information. Please follow the directions to create your username and password.     Your access code is: [email or web address]  Expires: 2018  8:41 AM     Your access code will  in 90 days. If you need help or a new code, please call your [place] " "clinic or [phone].        Care EveryWhere ID     This is your Care EveryWhere ID. This could be used by other organizations to access your [place] medical records  [id number]        Your Vitals Were     Temperature Respirations Height BMI (Body Mass Index)          97  F (36.1  C) 18 5' 7\" (1.702 m) 25.06 kg/m2         Blood Pressure from Last 3 Encounters:   02/26/18 144/65   11/29/17 152/67   09/08/17 147/67    Weight from Last 3 Encounters:   03/20/18 160 lb (72.6 kg)   02/26/18 163 lb (73.9 kg)   12/19/17 160 lb (72.6 kg)              We Performed the Following     ASPIRATION &/OR INJECTION GANGLION CYST, ANY     TRIAMCINOLONE ACET INJ NOS          Today's Medication Changes          These changes are accurate as of 3/20/18 11:59 PM.  If you have any questions, ask your nurse or doctor.               Start taking these medicines.        Dose/Directions    triamcinolone acetonide 40 MG/ML injection   Commonly known as:  KENALOG   Used for:  Ganglion cyst of wrist, left   Started by:  Juliano Childers MD        Dose:  20 mg   0.5 mLs (20 mg) by INTRA-ARTICULAR route once for 1 dose   Quantity:  0.5 mL   Refills:  0            Where to get your medicines      Some of these will need a paper prescription and others can be bought over the counter.  Ask your nurse if you have questions.     You don't need a prescription for these medications     triamcinolone acetonide 40 MG/ML injection                Primary Care Provider Office Phone # Fax #    Chandrika DO Art [phone]       [facility] [place] [address]        Equal Access to Services     MARYBETH SAN AH: Hadcatherine Mendoza, wagiseleda lucecilia, qaybta kaalfamilia burr. So [facility] [phone].    ATENCIÓN: Si habla español, tiene a torres disposición servicios gratuitos de asistencia lingüística. Llame al [phone].    We comply with applicable federal civil rights laws and " [place] laws. We do not discriminate on the basis of race, color, national origin, age, disability, sex, sexual orientation, or gender identity.            Thank you!     Thank you for choosing [facility]  for your care. Our goal is always to provide you with excellent care. Hearing back from our patients is one way we can continue to improve our services. Please take a few minutes to complete the written survey that you may receive in the mail after your visit with us. Thank you!             Your Updated Medication List - Protect others around you: Learn how to safely use, store and throw away your medicines at www.disposemymeds.org.          This list is accurate as of 3/20/18 11:59 PM.  Always use your most recent med list.                   Brand Name Dispense Instructions for use Diagnosis    ALEVE 220 MG tablet   Generic drug:  naproxen sodium      Take 220 mg by mouth nightly as needed for moderate pain        REINALDO SEED PO      Take 1 teaspoonful by mouth daily        cholecalciferol 74403 UNITS capsule    vitamin D3     Take 1 capsule by mouth daily.        co-enzyme Q-10 100 MG Caps capsule      Take  by mouth daily.        cyanocolbalamin 500 MCG tablet    vitamin  B-12     Take 500 mcg by mouth daily    Diarrhea, unspecified type       escitalopram 10 MG tablet    LEXAPRO    45 tablet    Take 0.5 tablets (5 mg) by mouth daily    Primary insomnia, Caregiver stress       Garlic 2000 MG Caps      Take  by mouth.        Ginger Root Powd      Take 1 Dose by mouth daily        lisinopril 10 MG tablet    PRINIVIL/ZESTRIL    90 tablet    Take 1 tablet (10 mg) by mouth daily    Hypertension goal BP (blood pressure) < 140/90       magnesium 100 MG Caps      Take  by mouth.        polyethylene glycol powder    MIRALAX    510 g    Take 17 g (1 capful) by mouth daily    Gastrointestinal symptom       pravastatin 10 MG tablet    PRAVACHOL    90 tablet    Take 1 tablet (10 mg) by mouth daily     Hyperlipidemia LDL goal <130       RED YEAST RICE PO           triamcinolone acetonide 40 MG/ML injection    KENALOG    0.5 mL    0.5 mLs (20 mg) by INTRA-ARTICULAR route once for 1 dose    Ganglion cyst of wrist, left       UNABLE TO FIND      daily MEDICATION NAME: Super Vitamin B    Diarrhea, unspecified type

## 2018-03-20 NOTE — LETTER
3/20/2018         RE: Bambi Feldman  [address] [address]        Dear Colleague,    Thank you for referring your patient, Bambi Feldman, to the [facility]. Please see a copy of my visit note below.    The patient's left wrist was prepped with betadine solution after verification of allergies. Area approximately 10 cm x 10 cm prepped in a sterile fashion. The skin was infiltrated with 1% lidocaine by Dr. Juliano Childers. Approximately 2 cc's of jelly-like fluid were removed. At this time the syringe was removed and the cyst was injected with 0.5ml kenalog. The betadine was then removed with soap and water and band-aids applied.     0.5ml kenalog with injected into patient's left wrist cyst by Dr. Juliano Childers  LOT# [id number]  Exp. 04/2019    VALE CorralesC  Supervising physician: Juliano Childers MD  Dept. of Orthopedics  [facility]          Bambi Feldman is a 89 year old male who is seen in consultation at the request of Dr. Jessa Alston  for left wrist mass.    Past Medical History:   Diagnosis Date     BPH (benign prostatic hyperplasia)      Elevated cholesterol      HTN (hypertension)      Other malignant neoplasm of skin, site unspecified     Non-melanoma skin cancer       Past Surgical History:   Procedure Laterality Date     SUPRAPUBIC PROSTATECTOMY       SURGICAL HISTORY OF -       skin cancer right neck and lip       Family History   Problem Relation Age of Onset     Alzheimer Disease Sister      C.A.D. No family hx of      DIABETES No family hx of      Hypertension No family hx of      CEREBROVASCULAR DISEASE No family hx of      Breast Cancer No family hx of      Cancer - colorectal No family hx of      Prostate Cancer No family hx of        Social History     Social History     Marital status: Single     Spouse name: N/A     Number of children: N/A     Years of education: N/A     Occupational History     Not on file.  "    Social History Main Topics     Smoking status: Former Smoker     Quit date: 1/20/1970     Smokeless tobacco: Never Used     Alcohol use Yes      Comment: 1 drink per night      Drug use: No     Sexual activity: Not Currently     Other Topics Concern     Parent/Sibling W/ Cabg, Mi Or Angioplasty Before 65f 55m? No     Social History Narrative    Lives in a house (one story, [address])  with daughter ([employer]).. She helps around the house, he is her caregiver.  Has a son in [facility] [employer] who is somewhat involved.    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         Current Outpatient Prescriptions   Medication Sig Dispense Refill     triamcinolone acetonide (KENALOG) 40 MG/ML injection 0.5 mLs (20 mg) by INTRA-ARTICULAR route once for 1 dose 0.5 mL 0     polyethylene glycol (MIRALAX) powder Take 17 g (1 capful) by mouth daily 510 g 3     escitalopram (LEXAPRO) 10 MG tablet Take 0.5 tablets (5 mg) by mouth daily 45 tablet 1     lisinopril (PRINIVIL/ZESTRIL) 10 MG tablet Take 1 tablet (10 mg) by mouth daily 90 tablet 3     pravastatin (PRAVACHOL) 10 MG tablet Take 1 tablet (10 mg) by mouth daily 90 tablet 3     cyanocolbalamin (VITAMIN  B-12) 500 MCG tablet Take 500 mcg by mouth daily       UNABLE TO FIND daily MEDICATION NAME: Super Vitamin B       REINALDO SEED PO Take 1 teaspoonful by mouth daily       Ginger Root POWD Take 1 Dose by mouth daily       naproxen sodium (ALEVE) 220 MG tablet Take 220 mg by mouth nightly as needed for moderate pain       Red Yeast Rice Extract (RED YEAST RICE PO)        Magnesium 100 MG CAPS Take  by mouth.       Garlic 2000 MG CAPS Take  by mouth.       co-enzyme Q-10 (COQ10) 100 MG CAPS Take  by mouth daily.       cholecalciferol (VITAMIN D3) 59474 UNIT capsule Take 1 capsule by mouth daily.         Allergies   Allergen Reactions     Nkda [No Known Drug Allergies]        REVIEW OF SYSTEMS:  CONSTITUTIONAL:  NEGATIVE for fever, chills, change in weight, not " "feeling tired  SKIN:  NEGATIVE for worrisome rashes, no skin lumps, no skin ulcers and no non-healing wounds  EYES:  NEGATIVE for vision changes or irritation.  ENT/MOUTH:  NEGATIVE.  No hearing loss, no hoarseness, no difficulty swallowing.  RESP:  NEGATIVE. No cough or shortness of breath.  CV:  NEGATIVE for chest pain, palpitations or peripheral edema  GI:  NEGATIVE for nausea, abdominal pain, heartburn, or change in bowel habits  :  Negative. No dysuria, no hematuria  MUSCULOSKELETAL:  See HPI above  NEURO:  NEGATIVE . No headaches, no dizziness,  no numbness  ENDOCRINE:  NEGATIVE for temperature intolerance, skin/hair changes  HEME/ALLERGY/IMMUNE:  NEGATIVE for bleeding problems  PSYCHIATRIC:  NEGATIVE. no anxiety, no depression.     Exam:  Vitals: Temp 97  F (36.1  C)  Resp 18  Ht 5' 7\" (1.702 m)  Wt 160 lb (72.6 kg)  BMI 25.06 kg/m2  BMI= Body mass index is 25.06 kg/(m^2).  Constitutional:  healthy, alert and no distress  Neuro: Alert and Oriented x 3, Gait normal. Sensation grossly WNL.  Psych: Affect normal   Respiratory: Breathing not labored.  Cardiovascular: normal peripheral pulses  Lymph: no adenopathy  Skin: No rashes,worrisome lesions or skin problems      Again, thank you for allowing me to participate in the care of your patient.        Sincerely,        Juliano Childers MD    "

## 2018-03-20 NOTE — PROGRESS NOTES
The patient's left wrist was prepped with betadine solution after verification of allergies. Area approximately 10 cm x 10 cm prepped in a sterile fashion. The skin was infiltrated with 1% lidocaine by Dr. Juliano Childers. Approximately 2 cc's of jelly-like fluid were removed. At this time the syringe was removed and the cyst was injected with 0.5ml kenalog. The betadine was then removed with soap and water and band-aids applied.     0.5ml kenalog with injected into patient's left wrist cyst by Dr. Juliano Childers  LOT# [id number]  Exp. 04/2019    VALE CorralesC  Supervising physician: Juliano Childers MD  Dept. of Orthopedics  [facility]

## 2018-03-22 PROBLEM — M67.432 GANGLION CYST OF WRIST, LEFT: Status: ACTIVE | Noted: 2018-03-22

## 2018-03-22 RX ORDER — TRIAMCINOLONE ACETONIDE 40 MG/ML
20 INJECTION, SUSPENSION INTRA-ARTICULAR; INTRAMUSCULAR ONCE
Qty: 0.5 ML | Refills: 0 | OUTPATIENT
Start: 2018-03-22 | End: 2018-03-22

## 2018-03-22 NOTE — PROGRESS NOTES
"Bambi Feldman is a 89 year old male who is seen in consultation at the request of Dr. Jessa Alston  for left wrist mass.    Past Medical History:   Diagnosis Date     BPH (benign prostatic hyperplasia)      Elevated cholesterol      HTN (hypertension)      Other malignant neoplasm of skin, site unspecified     Non-melanoma skin cancer       Past Surgical History:   Procedure Laterality Date     SUPRAPUBIC PROSTATECTOMY       SURGICAL HISTORY OF -       skin cancer right neck and lip       Family History   Problem Relation Age of Onset     Alzheimer Disease Sister      C.A.D. No family hx of      DIABETES No family hx of      Hypertension No family hx of      CEREBROVASCULAR DISEASE No family hx of      Breast Cancer No family hx of      Cancer - colorectal No family hx of      Prostate Cancer No family hx of        Social History     Social History     Marital status: Single     Spouse name: N/A     Number of children: N/A     Years of education: N/A     Occupational History     Not on file.     Social History Main Topics     Smoking status: Former Smoker     Quit date: 1/20/1970     Smokeless tobacco: Never Used     Alcohol use Yes      Comment: 1 drink per night      Drug use: No     Sexual activity: Not Currently     Other Topics Concern     Parent/Sibling W/ Cabg, Mi Or Angioplasty Before 65f 55m? No     Social History Narrative    Lives in a house ([address], [employer])  with daughter ([employer]).. She helps around the house, he is her caregiver.  Has a son in [facility] [employer] who is somewhat involved.    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         Current Outpatient Prescriptions   Medication Sig Dispense Refill     triamcinolone acetonide (KENALOG) 40 MG/ML injection 0.5 mLs (20 mg) by INTRA-ARTICULAR route once for 1 dose 0.5 mL 0     polyethylene glycol (MIRALAX) powder Take 17 g (1 capful) by mouth daily 510 g 3     escitalopram (LEXAPRO) 10 MG tablet Take 0.5 tablets (5 " "mg) by mouth daily 45 tablet 1     lisinopril (PRINIVIL/ZESTRIL) 10 MG tablet Take 1 tablet (10 mg) by mouth daily 90 tablet 3     pravastatin (PRAVACHOL) 10 MG tablet Take 1 tablet (10 mg) by mouth daily 90 tablet 3     cyanocolbalamin (VITAMIN  B-12) 500 MCG tablet Take 500 mcg by mouth daily       UNABLE TO FIND daily MEDICATION NAME: Super Vitamin B       REINALDO SEED PO Take 1 teaspoonful by mouth daily       Ginger Root POWD Take 1 Dose by mouth daily       naproxen sodium (ALEVE) 220 MG tablet Take 220 mg by mouth nightly as needed for moderate pain       Red Yeast Rice Extract (RED YEAST RICE PO)        Magnesium 100 MG CAPS Take  by mouth.       Garlic 2000 MG CAPS Take  by mouth.       co-enzyme Q-10 (COQ10) 100 MG CAPS Take  by mouth daily.       cholecalciferol (VITAMIN D3) 84487 UNIT capsule Take 1 capsule by mouth daily.         Allergies   Allergen Reactions     Nkda [No Known Drug Allergies]        REVIEW OF SYSTEMS:  CONSTITUTIONAL:  NEGATIVE for fever, chills, change in weight, not feeling tired  SKIN:  NEGATIVE for worrisome rashes, no skin lumps, no skin ulcers and no non-healing wounds  EYES:  NEGATIVE for vision changes or irritation.  ENT/MOUTH:  NEGATIVE.  No hearing loss, no hoarseness, no difficulty swallowing.  RESP:  NEGATIVE. No cough or shortness of breath.  CV:  NEGATIVE for chest pain, palpitations or peripheral edema  GI:  NEGATIVE for nausea, abdominal pain, heartburn, or change in bowel habits  :  Negative. No dysuria, no hematuria  MUSCULOSKELETAL:  See HPI above  NEURO:  NEGATIVE . No headaches, no dizziness,  no numbness  ENDOCRINE:  NEGATIVE for temperature intolerance, skin/hair changes  HEME/ALLERGY/IMMUNE:  NEGATIVE for bleeding problems  PSYCHIATRIC:  NEGATIVE. no anxiety, no depression.     Exam:  Vitals: Temp 97  F (36.1  C)  Resp 18  Ht 5' 7\" (1.702 m)  Wt 160 lb (72.6 kg)  BMI 25.06 kg/m2  BMI= Body mass index is 25.06 kg/(m^2).  Constitutional:  healthy, alert and " no distress  Neuro: Alert and Oriented x 3, Gait normal. Sensation grossly WNL.  Psych: Affect normal   Respiratory: Breathing not labored.  Cardiovascular: normal peripheral pulses  Lymph: no adenopathy  Skin: No rashes,worrisome lesions or skin problems

## 2018-03-22 NOTE — PROGRESS NOTES
Visit Date:   2018      DATE OF VISIT:  2018      HISTORY OF PRESENT ILLNESS:  Mr. Norman is a 89-year-old male referred from Dr. Jessa Alston for evaluation of left wrist mass.  He has a mass over the dorsum of the wrist that is somewhat tender and annoying.  He cannot wear a wrist watch because of it.  It has been present for about 3 months.  He has no pain at rest but with activity such as shoveling snow he has pain up to 8/10.  Ice tends to help.      PHYSICAL EXAMINATION:  There is a 2 x 1.5 cm mass over the dorsal aspect of the left wrist.  It is present over the distal radioulnar joint.  It is not tender at the more typical area toward the radial side or central side of the dorsal wrist.  He is not tender on the wrist itself, just on the mass.  He has good pronation and supination, wrist flexion and extension.  Sensation and circulation are intact.      IMPRESSION:  Left dorsal wrist ganglion cyst likely coming from the distal radioulnar joint.  I have explained the celina of ganglion cysts and that it is not dangerous.  If it is annoying we can consider aspiration, pressure, possible manual disruption or surgical excision.  He does not want surgery at this point.  He would like to try aspiration.      After consent we proceeded with sterile prep of the wrist.  Injection of 1% Lidocaine adjacent to the cyst and then aspiration through a short tunnel with removal of about 1 mL of thick gelatinous fluid.  I injected 20 mg of Kenalog.  We applied Tubigrip for compression.  He should use Tubigrip for 1 week.  Return to clinic p.r.n.         TERESA GOODWIN MD             D: 2018   T: 2018   MT: CAROLE      Name:     DEBRA NORMAN   MRN:      [id number]        Account:      [id number]   :      1927           Visit Date:   2018      Document: [id number]

## 2018-06-12 ENCOUNTER — OFFICE VISIT (OUTPATIENT)
Dept: FAMILY MEDICINE | Facility: CLINIC | Age: 83
End: 2018-06-12
Payer: COMMERCIAL

## 2018-06-12 VITALS
BODY MASS INDEX: 25.06 KG/M2 | OXYGEN SATURATION: 97 % | TEMPERATURE: 98.7 F | WEIGHT: 160 LBS | HEART RATE: 78 BPM | DIASTOLIC BLOOD PRESSURE: 67 MMHG | SYSTOLIC BLOOD PRESSURE: 130 MMHG

## 2018-06-12 DIAGNOSIS — R09.82 POST-NASAL DRAINAGE: Primary | ICD-10-CM

## 2018-06-12 PROCEDURE — 99213 OFFICE O/P EST LOW 20 MIN: CPT | Performed by: FAMILY MEDICINE

## 2018-06-12 RX ORDER — LORATADINE 10 MG/1
10 TABLET ORAL DAILY
Qty: 10 TABLET | Refills: 0 | Status: SHIPPED | OUTPATIENT
Start: 2018-06-12 | End: 2020-01-21

## 2018-06-12 NOTE — MR AVS SNAPSHOT
"              After Visit Summary   6/12/2018    Bambi Feldman    MRN: [id number]           Patient Information     Date Of Birth          12/23/1927        Visit Information        Provider Department      6/12/2018 2:00 PM Remy Alatorre MD [facility]        Today's Diagnoses     Post-nasal drainage    -  1       Follow-ups after your visit        Your next 10 appointments already scheduled     Jun 12, 2018  3:00 PM CDT   Return Visit with Juliano Childers MD   [facility]    [address]  [place] [id number] [phone]              Who to contact     If you have questions or need follow up information about today's clinic visit or your schedule please contact [facility] directly at [phone].  Normal or non-critical lab and imaging results will be communicated to you by MyChart, letter or phone within 4 business days after the clinic has received the results. If you do not hear from us within 7 days, please contact the clinic through MyChart or phone. If you have a critical or abnormal lab result, we will notify you by phone as soon as possible.  Submit refill requests through [employer] or call your pharmacy and they will forward the refill request to us. Please allow 3 business days for your refill to be completed.          Additional Information About Your Visit        MyChart Information     [employer] lets you send messages to your doctor, view your test results, renew your prescriptions, schedule appointments and more. To sign up, go to [email or web address] . Click on \"Log in\" on the left side of the screen, which will take you to the Welcome page. Then click on \"Sign up Now\" on the right side of the page.     You will be asked to enter the access code listed below, as well as some personal information. Please follow the directions to create your username and password.   "   Your access code is: [email or web address]  Expires: 2018  8:41 AM     Your access code will  in 90 days. If you need help or a new code, please call your [facility] or [phone].        Care EveryWhere ID     This is your Care EveryWhere ID. This could be used by other organizations to access your [place] medical records  [id number]        Your Vitals Were     Pulse Temperature Pulse Oximetry BMI (Body Mass Index)          78 98.7  F (37.1  C) (Oral) 97% 25.06 kg/m2         Blood Pressure from Last 3 Encounters:   18 130/67   18 144/65   17 152/67    Weight from Last 3 Encounters:   18 160 lb (72.6 kg)   18 160 lb (72.6 kg)   18 163 lb (73.9 kg)              Today, you had the following     No orders found for display         Today's Medication Changes          These changes are accurate as of 18  2:39 PM.  If you have any questions, ask your nurse or doctor.               Start taking these medicines.        Dose/Directions    loratadine 10 MG tablet   Commonly known as:  CLARITIN   Used for:  Post-nasal drainage   Started by:  Remy Alatorre MD        Dose:  10 mg   Take 1 tablet (10 mg) by mouth daily   Quantity:  10 tablet   Refills:  0            Where to get your medicines      Some of these will need a paper prescription and others can be bought over the counter.  Ask your nurse if you have questions.     Bring a paper prescription for each of these medications     loratadine 10 MG tablet                Primary Care Provider Office Phone # Fax #    Chandrika Stonemerlin  [phone]       [address] [facility] [address]        Equal Access to Services     AGUSTIN SAN AH: Hadcatherine Mendoza, wagiseleda luqadaha, qaybta kaalmada adeegyada, familia olivas. So [facility] [phone].    ATENCIÓN: Si habla español, tiene a torres disposición servicios gratuitos de asistencia lingüística. Llame al  [phone].    We comply with applicable federal civil rights laws and [place] laws. We do not discriminate on the basis of race, color, national origin, age, disability, sex, sexual orientation, or gender identity.            Thank you!     Thank you for choosing [facility]  for your care. Our goal is always to provide you with excellent care. Hearing back from our patients is one way we can continue to improve our services. Please take a few minutes to complete the written survey that you may receive in the mail after your visit with us. Thank you!             Your Updated Medication List - Protect others around you: Learn how to safely use, store and throw away your medicines at www.disposemymeds.org.          This list is accurate as of 6/12/18  2:39 PM.  Always use your most recent med list.                   Brand Name Dispense Instructions for use Diagnosis    ALEVE 220 MG tablet   Generic drug:  naproxen sodium      Take 220 mg by mouth nightly as needed for moderate pain        REINALDO SEED PO      Take 1 teaspoonful by mouth daily        cholecalciferol 52244 units capsule    vitamin D3     Take 1 capsule by mouth daily.        co-enzyme Q-10 100 MG Caps capsule      Take  by mouth daily.        cyanocolbalamin 500 MCG tablet    vitamin  B-12     Take 500 mcg by mouth daily    Diarrhea, unspecified type       escitalopram 10 MG tablet    LEXAPRO    45 tablet    Take 0.5 tablets (5 mg) by mouth daily    Primary insomnia, Caregiver stress       Garlic 2000 MG Caps      Take  by mouth.        Ginger Root Powd      Take 1 Dose by mouth daily        lisinopril 10 MG tablet    PRINIVIL/ZESTRIL    90 tablet    Take 1 tablet (10 mg) by mouth daily    Hypertension goal BP (blood pressure) < 140/90       loratadine 10 MG tablet    CLARITIN    10 tablet    Take 1 tablet (10 mg) by mouth daily    Post-nasal drainage       magnesium 100 MG Caps      Take  by mouth.        polyethylene glycol  powder    MIRALAX    510 g    Take 17 g (1 capful) by mouth daily    Gastrointestinal symptom       pravastatin 10 MG tablet    PRAVACHOL    90 tablet    Take 1 tablet (10 mg) by mouth daily    Hyperlipidemia LDL goal <130       RED YEAST RICE PO           UNABLE TO FIND      daily MEDICATION NAME: Super Vitamin B    Diarrhea, unspecified type

## 2018-06-12 NOTE — PROGRESS NOTES
SUBJECTIVE:   Bambi Feldman is a 89 year old male who presents to clinic today for the following health issues:      Acute Illness   Acute illness concerns: Cough  Onset: x 3-4 days    Fever: no     Chills/Sweats: no     Headache (location?): YES- Slight    Sinus Pressure:no    Conjunctivitis:  no    Ear Pain: no    Rhinorrhea: YES    Congestion: YES    Sore Throat: no     Cough: YES-non-productive    Wheeze: YES- Little bit    Decreased Appetite: no    Nausea: no    Vomiting: no    Diarrhea:  no    Dysuria/Freq.: no    Fatigue/Achiness: no    Sick/Strep Exposure: no     Therapies Tried and outcome: None    O: /67 (BP Location: Left arm, Patient Position: Sitting, Cuff Size: Adult Regular)  Pulse 78  Temp 98.7  F (37.1  C) (Oral)  Wt 160 lb (72.6 kg)  SpO2 97%  BMI 25.06 kg/m2      Head: Normocephalic, atraumatic.  Eyes: Conjunctiva clear, non icteric. PERRLA.  Ears: External ears and TMs normal BL.  Nose: Septum midline, nasal mucosa pink and moist. No discharge.  Mouth / Throat: Normal dentition.  No oral lesions. Pharynx non erythematous, tonsils without hypertrophy.  Neck: Supple, no enlarged LN, trachea midline.      Chest wall normal to inspection and palpation. Good excursion bilaterally. Lungs clear to auscultation. Good air movement bilaterally without rales, wheezes, or rhonchi.   Regular rate and  rhythm. S1 and S2 normal, no murmurs, clicks, gallops or rubs. No edema or JVD.      ICD-10-CM    1. Post-nasal drainage R09.82 loratadine (CLARITIN) 10 MG tablet     Patient was told if he develops fever or chills he should return.

## 2018-06-26 ENCOUNTER — OFFICE VISIT (OUTPATIENT)
Dept: ORTHOPEDICS | Facility: CLINIC | Age: 83
End: 2018-06-26
Payer: COMMERCIAL

## 2018-06-26 VITALS
BODY MASS INDEX: 25.11 KG/M2 | TEMPERATURE: 97.8 F | DIASTOLIC BLOOD PRESSURE: 71 MMHG | WEIGHT: 160 LBS | SYSTOLIC BLOOD PRESSURE: 127 MMHG | RESPIRATION RATE: 18 BRPM | HEIGHT: 67 IN

## 2018-06-26 DIAGNOSIS — M17.0 PRIMARY OSTEOARTHRITIS OF BOTH KNEES: Primary | ICD-10-CM

## 2018-06-26 PROCEDURE — 20610 DRAIN/INJ JOINT/BURSA W/O US: CPT | Mod: 50 | Performed by: ORTHOPAEDIC SURGERY

## 2018-06-26 NOTE — PROGRESS NOTES
Follow up bilateral knee osteoarthritis.  X-ray on 1/24/17 shows severe osteoarthritis with medial wear.  Has had prior treatment with Symptom treatment  Activity modification  Steroid injection last 1/24/17  NSAID use: naproxen (Anaprox, Naprosyn, Naprelan)  Physical Therapy: No  Pain limits him with walking, ambulating stairs and kneeling, bending, sleeping.  Range of motion 0-130 degrees..    Tenderness at medial knee.  Prior Synvisc injection? Yes, 6/5/17, 12/19/17 worked for several months.  Any other joint disease such as rheumatoid arthritis or psoriatic arthritis?  No    Assessment:  Bilateral knee osteoarthritis.  He  desires Synvisc injection today of bilateral knee(s).  Risks, benefits, potential complications and alternatives were discussed.   With the patient's consent, sterile prep was performed of bilateral knee(s).  Each knee was injected with 6 cc Synvisc-One after lidocaine infiltration of the needle tract at anterolateral site.  Return to clinic as needed.

## 2018-06-26 NOTE — PROGRESS NOTES
The patient's left and right knees were prepped with betadine solution after verification of allergies. Area approximately 10 cm x 10 cm prepped in a sterile fashion. After injection, betadine removed with soap and water and band-aids applied.  6 mL (48 mg) Synvisc-One was injected into each knee (for a total of 12mL, 96mg Synvisc-One) as directed after local anesthetic by Dr. Childers Product # [address]-6 Lot# [id number] exp. 02/28/2021    VALE CorralesC  Supervising physician: Juliano Childers MD  Dept. of Orthopedics  [facility]

## 2018-06-26 NOTE — MR AVS SNAPSHOT
"              After Visit Summary   6/26/2018    Bambi Feldman    MRN: [id number]           Patient Information     Date Of Birth          12/23/1927        Visit Information        Provider Department      6/26/2018 3:00 PM Juliano Childers MD [facility]        Today's Diagnoses     Primary osteoarthritis of both knees    -  1       Follow-ups after your visit        Who to contact     If you have questions or need follow up information about today's clinic visit or your schedule please contact [facility] directly at [phone].  Normal or non-critical lab and imaging results will be communicated to you by MyChart, letter or phone within 4 business days after the clinic has received the results. If you do not hear from us within 7 days, please contact the clinic through MyChart or phone. If you have a critical or abnormal lab result, we will notify you by phone as soon as possible.  Submit refill requests through [employer] or call your pharmacy and they will forward the refill request to us. Please allow 3 business days for your refill to be completed.          Additional Information About Your Visit        Care EveryWhere ID     This is your Care EveryWhere ID. This could be used by other organizations to access your [place] medical records  [id number]        Your Vitals Were     Temperature Respirations Height BMI (Body Mass Index)          97.8  F (36.6  C) 18 1.702 m (5' 7\") 25.06 kg/m2         Blood Pressure from Last 3 Encounters:   06/26/18 127/71   06/12/18 130/67   02/26/18 144/65    Weight from Last 3 Encounters:   06/26/18 72.6 kg (160 lb)   06/12/18 72.6 kg (160 lb)   03/20/18 72.6 kg (160 lb)              We Performed the Following     C SYNVISC PER 1 MG, 48 units     DRAIN/INJECT LARGE JOINT/BURSA          Today's Medication Changes          These changes are accurate as of 6/26/18  3:51 PM.  If you have any questions, ask your nurse or doctor.    "            Start taking these medicines.        Dose/Directions    hylan 48 MG/6ML injection   Commonly known as:  SYNVISC ONE   Used for:  Primary osteoarthritis of both knees   Started by:  Juliano Childers MD        Dose:  96 mg   12 mLs (96 mg) by INTRA-ARTICULAR route once for 1 dose   Quantity:  12 mL   Refills:  0            Where to get your medicines      Some of these will need a paper prescription and others can be bought over the counter.  Ask your nurse if you have questions.     You don't need a prescription for these medications     hylan 48 MG/6ML injection                Primary Care Provider Office Phone # Fax #    Chandrika Cordova [phone]       [address] [facility] [address]        Equal Access to Services     MARYBETH SAN : Frandy Mendoza, wagiseleda lumaninderadaha, qaybta kaalmada ademoo, familia olivas. So [facility] [phone].    ATENCIÓN: Si habla español, tiene a torres disposición servicios gratuitos de asistencia lingüística. Llame al [phone].    We comply with applicable federal civil rights laws and [place] laws. We do not discriminate on the basis of race, color, national origin, age, disability, sex, sexual orientation, or gender identity.            Thank you!     Thank you for choosing [facility]  for your care. Our goal is always to provide you with excellent care. Hearing back from our patients is one way we can continue to improve our services. Please take a few minutes to complete the written survey that you may receive in the mail after your visit with us. Thank you!             Your Updated Medication List - Protect others around you: Learn how to safely use, store and throw away your medicines at www.disposemymeds.org.          This list is accurate as of 6/26/18  3:51 PM.  Always use your most recent med list.                   Brand Name Dispense Instructions for use Diagnosis     ALEVE 220 MG tablet   Generic drug:  naproxen sodium      Take 220 mg by mouth nightly as needed for moderate pain        REINALDO SEED PO      Take 1 teaspoonful by mouth daily        cholecalciferol 65857 units capsule    vitamin D3     Take 1 capsule by mouth daily.        co-enzyme Q-10 100 MG Caps capsule      Take  by mouth daily.        cyanocolbalamin 500 MCG tablet    vitamin  B-12     Take 500 mcg by mouth daily    Diarrhea, unspecified type       escitalopram 10 MG tablet    LEXAPRO    45 tablet    Take 0.5 tablets (5 mg) by mouth daily    Primary insomnia, Caregiver stress       Garlic 2000 MG Caps      Take  by mouth.        Ginger Root Powd      Take 1 Dose by mouth daily        hylan 48 MG/6ML injection    SYNVISC ONE    12 mL    12 mLs (96 mg) by INTRA-ARTICULAR route once for 1 dose    Primary osteoarthritis of both knees       lisinopril 10 MG tablet    PRINIVIL/ZESTRIL    90 tablet    Take 1 tablet (10 mg) by mouth daily    Hypertension goal BP (blood pressure) < 140/90       loratadine 10 MG tablet    CLARITIN    10 tablet    Take 1 tablet (10 mg) by mouth daily    Post-nasal drainage       magnesium 100 MG Caps      Take  by mouth.        polyethylene glycol powder    MIRALAX    510 g    Take 17 g (1 capful) by mouth daily    Gastrointestinal symptom       pravastatin 10 MG tablet    PRAVACHOL    90 tablet    Take 1 tablet (10 mg) by mouth daily    Hyperlipidemia LDL goal <130       RED YEAST RICE PO           UNABLE TO FIND      daily MEDICATION NAME: Super Vitamin B    Diarrhea, unspecified type

## 2018-06-26 NOTE — LETTER
6/26/2018         RE: Bambi Feldman  [address] [address]        Dear Colleague,    Thank you for referring your patient, Bambi Feldman, to the [facility]. Please see a copy of my visit note below.    The patient's left and right knees were prepped with betadine solution after verification of allergies. Area approximately 10 cm x 10 cm prepped in a sterile fashion. After injection, betadine removed with soap and water and band-aids applied.  6 mL (48 mg) Synvisc-One was injected into each knee (for a total of 12mL, 96mg Synvisc-One) as directed after local anesthetic by Dr. Childers Product # [address]-5 Lot# [id number] exp. 02/28/2021    Godfrey Pinon PAErmelindaC  Supervising physician: Juliano Childers MD  Dept. of Orthopedics  [facility]          Follow up bilateral knee osteoarthritis.  X-ray on 1/24/17 shows severe osteoarthritis with medial wear.  Has had prior treatment with Symptom treatment  Activity modification  Steroid injection last 1/24/17  NSAID use: naproxen (Anaprox, Naprosyn, Naprelan)  Physical Therapy: No  Pain limits him with walking, ambulating stairs and kneeling, bending, sleeping.  Range of motion 0-130 degrees..    Tenderness at medial knee.  Prior Synvisc injection? Yes, 6/5/17, 12/19/17 worked for several months.  Any other joint disease such as rheumatoid arthritis or psoriatic arthritis?  No    Assessment:  Bilateral knee osteoarthritis.  He  desires Synvisc injection today of bilateral knee(s).  Risks, benefits, potential complications and alternatives were discussed.   With the patient's consent, sterile prep was performed of bilateral knee(s).  Each knee was injected with 6 cc Synvisc-One after lidocaine infiltration of the needle tract at anterolateral site.  Return to clinic as needed.         Again, thank you for allowing me to participate in the care of your patient.        Sincerely,        Juliano Childers MD

## 2018-08-08 ENCOUNTER — OFFICE VISIT (OUTPATIENT)
Dept: FAMILY MEDICINE | Facility: CLINIC | Age: 83
End: 2018-08-08
Payer: COMMERCIAL

## 2018-08-08 VITALS
DIASTOLIC BLOOD PRESSURE: 65 MMHG | HEART RATE: 77 BPM | BODY MASS INDEX: 26.31 KG/M2 | OXYGEN SATURATION: 97 % | TEMPERATURE: 97.1 F | SYSTOLIC BLOOD PRESSURE: 126 MMHG | WEIGHT: 168 LBS

## 2018-08-08 DIAGNOSIS — M17.0 PRIMARY OSTEOARTHRITIS OF BOTH KNEES: Primary | ICD-10-CM

## 2018-08-08 DIAGNOSIS — N18.30 CHRONIC KIDNEY DISEASE, STAGE 3 (MODERATE): ICD-10-CM

## 2018-08-08 DIAGNOSIS — I10 HYPERTENSION GOAL BP (BLOOD PRESSURE) < 140/90: ICD-10-CM

## 2018-08-08 PROCEDURE — 20610 DRAIN/INJ JOINT/BURSA W/O US: CPT | Performed by: FAMILY MEDICINE

## 2018-08-08 PROCEDURE — 99213 OFFICE O/P EST LOW 20 MIN: CPT | Mod: 25 | Performed by: FAMILY MEDICINE

## 2018-08-08 ASSESSMENT — PAIN SCALES - GENERAL: PAINLEVEL: EXTREME PAIN (8)

## 2018-08-08 NOTE — MR AVS SNAPSHOT
After Visit Summary   8/8/2018    Bambi Fledman    MRN: [id number]           Patient Information     Date Of Birth          12/23/1927        Visit Information        Provider Department      8/8/2018 10:40 AM Juliano Linda MD [facility]        Today's Diagnoses     Primary osteoarthritis of both knees    -  1       Follow-ups after your visit        Follow-up notes from your care team     Return if symptoms worsen or fail to improve.      Who to contact     If you have questions or need follow up information about today's clinic visit or your schedule please contact [facility] directly at [phone].  Normal or non-critical lab and imaging results will be communicated to you by MyChart, letter or phone within 4 business days after the clinic has received the results. If you do not hear from us within 7 days, please contact the clinic through MyChart or phone. If you have a critical or abnormal lab result, we will notify you by phone as soon as possible.  Submit refill requests through [employer] or call your pharmacy and they will forward the refill request to us. Please allow 3 business days for your refill to be completed.          Additional Information About Your Visit        Care EveryWhere ID     This is your Care EveryWhere ID. This could be used by other organizations to access your [place] medical records  [id number]        Your Vitals Were     Pulse Temperature Pulse Oximetry BMI (Body Mass Index)          77 97.1  F (36.2  C) (Oral) 97% 26.31 kg/m2         Blood Pressure from Last 3 Encounters:   08/08/18 126/65   06/26/18 127/71   06/12/18 130/67    Weight from Last 3 Encounters:   08/08/18 168 lb (76.2 kg)   06/26/18 160 lb (72.6 kg)   06/12/18 160 lb (72.6 kg)              Today, you had the following     No orders found for display       Primary Care Provider Office Phone # Fax #    Chandrika Cordova [phone]  [facility] [address]        Equal Access to Services     MARYBETH SAN : Hadii aad ku hadamyjuan Mendoza, waaxda lumaninderadaha, qaybta faramafamilia chavis. So [facility] [phone].    ATENCIÓN: Si habla español, tiene a torres disposición servicios gratuitos de asistencia lingüística. Llame al [phone].    We comply with applicable federal civil rights laws and [place] laws. We do not discriminate on the basis of race, color, national origin, age, disability, sex, sexual orientation, or gender identity.            Thank you!     Thank you for choosing [facility]  for your care. Our goal is always to provide you with excellent care. Hearing back from our patients is one way we can continue to improve our services. Please take a few minutes to complete the written survey that you may receive in the mail after your visit with us. Thank you!             Your Updated Medication List - Protect others around you: Learn how to safely use, store and throw away your medicines at www.disposemymeds.org.          This list is accurate as of 8/8/18 11:17 AM.  Always use your most recent med list.                   Brand Name Dispense Instructions for use Diagnosis    ALEVE 220 MG tablet   Generic drug:  naproxen sodium      Take 220 mg by mouth nightly as needed for moderate pain        REINALDO SEED PO      Take 1 teaspoonful by mouth daily        cholecalciferol 73099 units capsule    vitamin D3     Take 1 capsule by mouth daily.        co-enzyme Q-10 100 MG Caps capsule      Take  by mouth daily.        cyanocolbalamin 500 MCG tablet    vitamin  B-12     Take 500 mcg by mouth daily    Diarrhea, unspecified type       escitalopram 10 MG tablet    LEXAPRO    45 tablet    Take 0.5 tablets (5 mg) by mouth daily    Primary insomnia, Caregiver stress       Garlic 2000 MG Caps      Take  by mouth.        Ginger Root Powd      Take 1 Dose by mouth daily         lisinopril 10 MG tablet    PRINIVIL/ZESTRIL    90 tablet    Take 1 tablet (10 mg) by mouth daily    Hypertension goal BP (blood pressure) < 140/90       loratadine 10 MG tablet    CLARITIN    10 tablet    Take 1 tablet (10 mg) by mouth daily    Post-nasal drainage       magnesium 100 MG Caps      Take  by mouth.        polyethylene glycol powder    MIRALAX    510 g    Take 17 g (1 capful) by mouth daily    Gastrointestinal symptom       pravastatin 10 MG tablet    PRAVACHOL    90 tablet    Take 1 tablet (10 mg) by mouth daily    Hyperlipidemia LDL goal <130       RED YEAST RICE PO           UNABLE TO FIND      daily MEDICATION NAME: Super Vitamin B    Diarrhea, unspecified type

## 2018-08-08 NOTE — PROGRESS NOTES
SUBJECTIVE:   Bambi Feldman is a 89 year old male who presents to clinic today for the following health issues:      Joint Pain    Onset: Last couple of years    Description:   Location: left knee and right knee  Character: Sharp    Intensity: 8/10    Progression of Symptoms: worse    Accompanying Signs & Symptoms:  Other symptoms: none    History:   Previous similar pain: YES      Precipitating factors:   Trauma or overuse: no     Alleviating factors:  Improved by: support wrap and Aleve    Therapies Tried and outcome: support wrap and Aleve    He has known bilateral knee arthritis.  It is moderate to severe in both knees, especially the medial compartment.  He has seen Dr. Childers for this.  He has had some Synvisc injections, but he does not feel like those have done much for him.  He wonders what else could be done for his knees.  He does not recall ever having had cortisone shots.  He is open to trying those.  His chart does show a history of chronic kidney disease and hypertension, so we would need to be careful with NSAIDs.  He does take Aleve p.m. each evening.  He does not generally take medication during the day for his knee pain.    Problem list and histories reviewed & adjusted, as indicated.  Additional history: as documented    Patient Active Problem List   Diagnosis     Hypertension goal BP (blood pressure) < 140/90     Other malignant neoplasm of skin, site unspecified     HYPERLIPIDEMIA LDL GOAL <130     Advanced directives, counseling/discussion     Elevated serum creatinine     Primary osteoarthritis of both knees     Trochanteric bursitis of right hip     Chronic kidney disease, stage 3 (moderate)     Ganglion cyst of wrist, left     Past Surgical History:   Procedure Laterality Date     SUPRAPUBIC PROSTATECTOMY       SURGICAL HISTORY OF -       skin cancer right neck and lip       Social History   Substance Use Topics     Smoking status: Former Smoker     Quit date: 1/20/1970     Smokeless  tobacco: Never Used     Alcohol use Yes      Comment: 1 drink per night      Family History   Problem Relation Age of Onset     Alzheimer Disease Sister      C.A.D. No family hx of      Diabetes No family hx of      Hypertension No family hx of      Cerebrovascular Disease No family hx of      Breast Cancer No family hx of      Cancer - colorectal No family hx of      Prostate Cancer No family hx of          Current Outpatient Prescriptions   Medication Sig Dispense Refill     REINALDO SEED PO Take 1 teaspoonful by mouth daily       cholecalciferol (VITAMIN D3) 18200 UNIT capsule Take 1 capsule by mouth daily.       co-enzyme Q-10 (COQ10) 100 MG CAPS Take  by mouth daily.       cyanocolbalamin (VITAMIN  B-12) 500 MCG tablet Take 500 mcg by mouth daily       escitalopram (LEXAPRO) 10 MG tablet Take 0.5 tablets (5 mg) by mouth daily 45 tablet 1     Garlic 2000 MG CAPS Take  by mouth.       Ginger Root POWD Take 1 Dose by mouth daily       lisinopril (PRINIVIL/ZESTRIL) 10 MG tablet Take 1 tablet (10 mg) by mouth daily 90 tablet 3     loratadine (CLARITIN) 10 MG tablet Take 1 tablet (10 mg) by mouth daily 10 tablet 0     Magnesium 100 MG CAPS Take  by mouth.       naproxen sodium (ALEVE) 220 MG tablet Take 220 mg by mouth nightly as needed for moderate pain       polyethylene glycol (MIRALAX) powder Take 17 g (1 capful) by mouth daily 510 g 3     pravastatin (PRAVACHOL) 10 MG tablet Take 1 tablet (10 mg) by mouth daily 90 tablet 3     Red Yeast Rice Extract (RED YEAST RICE PO)        UNABLE TO FIND daily MEDICATION NAME: Super Vitamin B       Allergies   Allergen Reactions     Nkda [No Known Drug Allergies]        Reviewed and updated as needed this visit by clinical staff  Tobacco  Allergies  Meds  Med Hx  Surg Hx  Fam Hx  Soc Hx      Reviewed and updated as needed this visit by Provider         ROS:  Mainly significant for the above.  He is not bothered by arthritis pain elsewhere.  He lives with his adult daughter  who has had bipolar illness since the age of 19 and is on numerous medications for that.    OBJECTIVE:     /65 (BP Location: Left arm, Patient Position: Chair, Cuff Size: Adult Regular)  Pulse 77  Temp 97.1  F (36.2  C) (Oral)  Wt 168 lb (76.2 kg)  SpO2 97%  BMI 26.31 kg/m2  Body mass index is 26.31 kg/(m^2).  GENERAL: healthy, alert and no distress  MS: He has small joint effusions in both knees.  No warmth or erythema of the knees.    After discussion of various treatment options, and after reviewing risks and benefits and potential complications, we elected to proceed with a cortisone injection for each knee  The lateral aspect of each knee was cleansed and then each was injected with my usual mixture of 1 cc of 80 mg/cc Depomedrol suspension along with 2 cc of 1% Lidocaine without epi and 2 cc of 0.5% Marcaine.    Diagnostic Test Results:  Results for orders placed or performed in visit on 01/24/17   XR Knee Bilateral 3 Views    Narrative    KNEE BILATERAL THREE VIEWS  1/24/2017 1:54 PM     HISTORY: Bilateral primary osteoarthritis of knee.    COMPARISON: April 11, 2012.      Impression    IMPRESSION: A total of six radiographs submitted for review, three of  each knee.  1. Moderate to severe joint space narrowing in the medial compartment  of the right knee, comparable to previous exam. Small right knee  effusion. Mild patellar osteophytes.  2. Moderate to severe joint space narrowing in the medial left knee  compartment with small knee effusion and mild patellar osteophytes. No  acute fracture.    LUISA GREGORIO MD         ASSESSMENT/PLAN:       ICD-10-CM    1. Primary osteoarthritis of both knees M17.0 METHYLPREDNISOLONE 80 MG INJ     DRAIN/INJECT LARGE JOINT/BURSA   2. Hypertension goal BP (blood pressure) < 140/90 I10    3. Chronic kidney disease, stage 3 (moderate) N18.3      We will avoid prescription NSAIDs at this point given his history of chronic kidney disease and hypertension  We will  see how the cortisone injections work  I discussed that he could have these done every 3-4 months if needed/desired  I suggested trying acetaminophen during the day to see if that would give him some relief    If new, worsening or persistent symptoms, the patient is to call or return for a recheck.      Juliano Linda MD  [facility]

## 2018-08-27 ENCOUNTER — OFFICE VISIT (OUTPATIENT)
Dept: FAMILY MEDICINE | Facility: CLINIC | Age: 83
End: 2018-08-27
Payer: COMMERCIAL

## 2018-08-27 VITALS
BODY MASS INDEX: 25.84 KG/M2 | SYSTOLIC BLOOD PRESSURE: 123 MMHG | OXYGEN SATURATION: 96 % | TEMPERATURE: 97.4 F | DIASTOLIC BLOOD PRESSURE: 69 MMHG | WEIGHT: 165 LBS | HEART RATE: 79 BPM

## 2018-08-27 DIAGNOSIS — M25.551 HIP PAIN, RIGHT: Primary | ICD-10-CM

## 2018-08-27 DIAGNOSIS — M17.0 PRIMARY OSTEOARTHRITIS OF BOTH KNEES: ICD-10-CM

## 2018-08-27 DIAGNOSIS — M70.61 TROCHANTERIC BURSITIS OF RIGHT HIP: ICD-10-CM

## 2018-08-27 DIAGNOSIS — R79.89 ELEVATED SERUM CREATININE: ICD-10-CM

## 2018-08-27 PROCEDURE — 20610 DRAIN/INJ JOINT/BURSA W/O US: CPT | Mod: RT | Performed by: FAMILY MEDICINE

## 2018-08-27 PROCEDURE — 99213 OFFICE O/P EST LOW 20 MIN: CPT | Mod: 25 | Performed by: FAMILY MEDICINE

## 2018-08-27 ASSESSMENT — PAIN SCALES - GENERAL: PAINLEVEL: EXTREME PAIN (8)

## 2018-08-27 NOTE — MR AVS SNAPSHOT
After Visit Summary   8/27/2018    Bambi Feldman    MRN: [id number]           Patient Information     Date Of Birth          12/23/1927        Visit Information        Provider Department      8/27/2018 10:40 AM Juliano Linda MD [facility]        Today's Diagnoses     Hip pain, right    -  1    Trochanteric bursitis of right hip           Follow-ups after your visit        Follow-up notes from your care team     Return if symptoms worsen or fail to improve.      Who to contact     If you have questions or need follow up information about today's clinic visit or your schedule please contact [facility] directly at [phone].  Normal or non-critical lab and imaging results will be communicated to you by MyChart, letter or phone within 4 business days after the clinic has received the results. If you do not hear from us within 7 days, please contact the clinic through MyChart or phone. If you have a critical or abnormal lab result, we will notify you by phone as soon as possible.  Submit refill requests through [employer] or call your pharmacy and they will forward the refill request to us. Please allow 3 business days for your refill to be completed.          Additional Information About Your Visit        Care EveryWhere ID     This is your Care EveryWhere ID. This could be used by other organizations to access your [place] medical records  [id number]        Your Vitals Were     Pulse Temperature Pulse Oximetry BMI (Body Mass Index)          79 97.4  F (36.3  C) (Oral) 96% 25.84 kg/m2         Blood Pressure from Last 3 Encounters:   08/27/18 123/69   08/08/18 126/65   06/26/18 127/71    Weight from Last 3 Encounters:   08/27/18 165 lb (74.8 kg)   08/08/18 168 lb (76.2 kg)   06/26/18 160 lb (72.6 kg)              Today, you had the following     No orders found for display       Primary Care Provider Office Phone # Fax #    Chandrika Cordova [phone]  [phone]       [phone] [facility] [address]        Equal Access to Services     MARYBETH SAN : Hadii aad ku hadamyjuan Mendoza, wagiseleda lumaninderadaha, qaybta kaalmada marianndaletila, familia braxtonmikerupa olivas. So [facility] [phone].    ATENCIÓN: Si habla español, tiene a torres disposición servicios gratuitos de asistencia lingüística. Llame al [phone].    We comply with applicable federal civil rights laws and [place] laws. We do not discriminate on the basis of race, color, national origin, age, disability, sex, sexual orientation, or gender identity.            Thank you!     Thank you for choosing [facility]  for your care. Our goal is always to provide you with excellent care. Hearing back from our patients is one way we can continue to improve our services. Please take a few minutes to complete the written survey that you may receive in the mail after your visit with us. Thank you!             Your Updated Medication List - Protect others around you: Learn how to safely use, store and throw away your medicines at www.disposemymeds.org.          This list is accurate as of 8/27/18 11:02 AM.  Always use your most recent med list.                   Brand Name Dispense Instructions for use Diagnosis    ALEVE 220 MG tablet   Generic drug:  naproxen sodium      Take 220 mg by mouth nightly as needed for moderate pain        REINALDO SEED PO      Take 1 teaspoonful by mouth daily        cholecalciferol 15383 units capsule    vitamin D3     Take 1 capsule by mouth daily.        co-enzyme Q-10 100 MG Caps capsule      Take  by mouth daily.        cyanocolbalamin 500 MCG tablet    vitamin  B-12     Take 500 mcg by mouth daily    Diarrhea, unspecified type       escitalopram 10 MG tablet    LEXAPRO    45 tablet    Take 0.5 tablets (5 mg) by mouth daily    Primary insomnia, Caregiver stress       Garlic 2000 MG Caps      Take  by mouth.        Ginger Root Powd      Take 1 Dose by  mouth daily        lisinopril 10 MG tablet    PRINIVIL/ZESTRIL    90 tablet    Take 1 tablet (10 mg) by mouth daily    Hypertension goal BP (blood pressure) < 140/90       loratadine 10 MG tablet    CLARITIN    10 tablet    Take 1 tablet (10 mg) by mouth daily    Post-nasal drainage       magnesium 100 MG Caps      Take  by mouth.        polyethylene glycol powder    MIRALAX    510 g    Take 17 g (1 capful) by mouth daily    Gastrointestinal symptom       pravastatin 10 MG tablet    PRAVACHOL    90 tablet    Take 1 tablet (10 mg) by mouth daily    Hyperlipidemia LDL goal <130       RED YEAST RICE PO           UNABLE TO FIND      daily MEDICATION NAME: Super Vitamin B    Diarrhea, unspecified type

## 2018-08-27 NOTE — PROGRESS NOTES
SUBJECTIVE:   Bambi Feldman is a 89 year old male who presents to clinic today for the following health issues:      Possible injection in knees. Patient stated that he doesn't remember getting injections on 8/8/18. He is still having knee pain and his right hip is bothering him. He would like to get an injection in right hip.    He had forgotten that we did knee injections for him a few weeks ago.  His knees still bother him, but he thinks they are somewhat better than they were prior to earlier this month.  He is having some right hip discomfort.  He had seen Dr. Childers for this so year and a half ago.  He has pain over the right lateral hip when he lies on that side at night.  He has some discomfort in the posterior hip when he gets up and moves around.  He is not having much discomfort into the groin.  He wonders if he could get a cortisone injection for the right hip.  He has never had one for that that he can recall.    Problem list and histories reviewed & adjusted, as indicated.  Additional history: as documented    Patient Active Problem List   Diagnosis     Hypertension goal BP (blood pressure) < 140/90     Other malignant neoplasm of skin, site unspecified     HYPERLIPIDEMIA LDL GOAL <130     Advanced directives, counseling/discussion     Elevated serum creatinine     Primary osteoarthritis of both knees     Trochanteric bursitis of right hip     Chronic kidney disease, stage 3 (moderate)     Ganglion cyst of wrist, left     Past Surgical History:   Procedure Laterality Date     SUPRAPUBIC PROSTATECTOMY       SURGICAL HISTORY OF -       skin cancer right neck and lip       Social History   Substance Use Topics     Smoking status: Former Smoker     Quit date: 1/20/1970     Smokeless tobacco: Never Used     Alcohol use Yes      Comment: 1 drink per night      Family History   Problem Relation Age of Onset     Alzheimer Disease Sister      C.A.D. No family hx of      Diabetes No family hx of       Hypertension No family hx of      Cerebrovascular Disease No family hx of      Breast Cancer No family hx of      Cancer - colorectal No family hx of      Prostate Cancer No family hx of          Current Outpatient Prescriptions   Medication Sig Dispense Refill     REINALDO SEED PO Take 1 teaspoonful by mouth daily       cholecalciferol (VITAMIN D3) 98468 UNIT capsule Take 1 capsule by mouth daily.       co-enzyme Q-10 (COQ10) 100 MG CAPS Take  by mouth daily.       cyanocolbalamin (VITAMIN  B-12) 500 MCG tablet Take 500 mcg by mouth daily       escitalopram (LEXAPRO) 10 MG tablet Take 0.5 tablets (5 mg) by mouth daily 45 tablet 1     Garlic 2000 MG CAPS Take  by mouth.       Ginger Root POWD Take 1 Dose by mouth daily       lisinopril (PRINIVIL/ZESTRIL) 10 MG tablet Take 1 tablet (10 mg) by mouth daily 90 tablet 3     loratadine (CLARITIN) 10 MG tablet Take 1 tablet (10 mg) by mouth daily 10 tablet 0     Magnesium 100 MG CAPS Take  by mouth.       naproxen sodium (ALEVE) 220 MG tablet Take 220 mg by mouth nightly as needed for moderate pain       polyethylene glycol (MIRALAX) powder Take 17 g (1 capful) by mouth daily 510 g 3     pravastatin (PRAVACHOL) 10 MG tablet Take 1 tablet (10 mg) by mouth daily 90 tablet 3     Red Yeast Rice Extract (RED YEAST RICE PO)        UNABLE TO FIND daily MEDICATION NAME: Super Vitamin B       Allergies   Allergen Reactions     Nkda [No Known Drug Allergies]        Reviewed and updated as needed this visit by clinical staff  Tobacco  Allergies  Meds  Med Hx  Surg Hx  Fam Hx  Soc Hx      Reviewed and updated as needed this visit by Provider         ROS:  Noncontributory except as above    OBJECTIVE:     /69 (BP Location: Right arm, Patient Position: Chair, Cuff Size: Adult Regular)  Pulse 79  Temp 97.4  F (36.3  C) (Oral)  Wt 165 lb (74.8 kg)  SpO2 96%  BMI 25.84 kg/m2  Body mass index is 25.84 kg/(m^2).  GENERAL: healthy, alert and no distress  MS: He has fairly minimal  discomfort to palpation over the right lateral hip today.  He has mild discomfort in the right hip with range of motion, especially with internal rotation.    After discussion of various treatment options, and after reviewing risks and benefits and potential complications, we elected to proceed with a cortisone injection.  The lateral aspect of the Right hip was cleansed and then injected with my usual mixture of 1 cc of 80 mg/cc Depomedrol suspension along with 2 cc of 1% Lidocaine without epi and 2 cc of 0.5% Marcaine.    Diagnostic Test Results:  none     ASSESSMENT/PLAN:       ICD-10-CM    1. Hip pain, right M25.551 DRAIN/INJECT LARGE JOINT/BURSA     METHYLPREDNISOLONE 80 MG INJ   2. Trochanteric bursitis of right hip M70.61 DRAIN/INJECT LARGE JOINT/BURSA     METHYLPREDNISOLONE 80 MG INJ   3. Primary osteoarthritis of both knees M17.0    4. Elevated serum creatinine R79.89      I suspect his right hip discomfort is due to a combination of osteoarthritis as well as greater trochanteric bursitis  While he did not have a lot of pain to palpation over the right lateral hip, he has been having pain when lying on that side, so we will see how this cortisone injection does for him  We could consider an intra-articular hip injection at some point for arthritis as well  We will hold off on further knee injections since those were just done a few weeks ago  He could take Tylenol for discomfort, continuing to avoid NSAIDs because of his history of elevated creatinine    If new, worsening or persistent symptoms, the patient is to call or return for a recheck.      Juliano Linda MD  [facility]

## 2018-09-20 ENCOUNTER — OFFICE VISIT (OUTPATIENT)
Dept: FAMILY MEDICINE | Facility: CLINIC | Age: 83
End: 2018-09-20
Payer: COMMERCIAL

## 2018-09-20 VITALS
HEART RATE: 80 BPM | DIASTOLIC BLOOD PRESSURE: 65 MMHG | SYSTOLIC BLOOD PRESSURE: 106 MMHG | OXYGEN SATURATION: 96 % | WEIGHT: 165 LBS | TEMPERATURE: 96.9 F | BODY MASS INDEX: 25.84 KG/M2

## 2018-09-20 DIAGNOSIS — G47.09 OTHER INSOMNIA: Primary | ICD-10-CM

## 2018-09-20 DIAGNOSIS — M70.61 TROCHANTERIC BURSITIS OF RIGHT HIP: ICD-10-CM

## 2018-09-20 PROCEDURE — 20610 DRAIN/INJ JOINT/BURSA W/O US: CPT | Mod: RT | Performed by: INTERNAL MEDICINE

## 2018-09-20 PROCEDURE — 99214 OFFICE O/P EST MOD 30 MIN: CPT | Mod: 25 | Performed by: INTERNAL MEDICINE

## 2018-09-20 RX ORDER — MIRTAZAPINE 15 MG/1
7.5 TABLET, FILM COATED ORAL AT BEDTIME
Qty: 30 TABLET | Refills: 1 | Status: SHIPPED | OUTPATIENT
Start: 2018-09-20 | End: 2018-12-13

## 2018-09-20 ASSESSMENT — PAIN SCALES - GENERAL: PAINLEVEL: SEVERE PAIN (7)

## 2018-09-20 NOTE — Clinical Note
"Call patient tomorrow, Friday, and ask how he is doing.  Remind him he got hip injection Thursday \"yesterday\""

## 2018-09-20 NOTE — PATIENT INSTRUCTIONS
Trial mirtazipine 15 mg,  1/2 tab or 1 full tab at bedtime for sleep and for mood    Return to clinic 1 month follow up on above

## 2018-09-20 NOTE — MR AVS SNAPSHOT
After Visit Summary   9/20/2018    Bambi Feldman    MRN: [id number]           Patient Information     Date Of Birth          12/23/1927        Visit Information        Provider Department      9/20/2018 3:40 PM Jessa Alston MD [facility]        Today's Diagnoses     Other insomnia    -  1    Trochanteric bursitis of right hip          Care Instructions    Trial mirtazipine 15 mg,  1/2 tab or 1 full tab at bedtime for sleep and for mood    Return to clinic 1 month follow up on above          Follow-ups after your visit        Who to contact     If you have questions or need follow up information about today's clinic visit or your schedule please contact [facility] directly at [phone].  Normal or non-critical lab and imaging results will be communicated to you by MyChart, letter or phone within 4 business days after the clinic has received the results. If you do not hear from us within 7 days, please contact the clinic through MyChart or phone. If you have a critical or abnormal lab result, we will notify you by phone as soon as possible.  Submit refill requests through Zite or call your pharmacy and they will forward the refill request to us. Please allow 3 business days for your refill to be completed.          Additional Information About Your Visit        Care EveryWhere ID     This is your Care EveryWhere ID. This could be used by other organizations to access your [place] medical records  [id number]        Your Vitals Were     Pulse Temperature Pulse Oximetry BMI (Body Mass Index)          80 96.9  F (36.1  C) (Oral) 96% 25.84 kg/m2         Blood Pressure from Last 3 Encounters:   09/20/18 106/65   08/27/18 123/69   08/08/18 126/65    Weight from Last 3 Encounters:   09/20/18 165 lb (74.8 kg)   08/27/18 165 lb (74.8 kg)   08/08/18 168 lb (76.2 kg)              We Performed the Following     DRAIN/INJECT LARGE JOINT/BURSA      TRIAMCINOLONE ACET INJ NOS          Today's Medication Changes          These changes are accurate as of 9/20/18  4:24 PM.  If you have any questions, ask your nurse or doctor.               Start taking these medicines.        Dose/Directions    mirtazapine 15 MG tablet   Commonly known as:  REMERON   Used for:  Other insomnia   Started by:  Jessa Alston MD        Dose:  7.5 mg   Take 0.5 tablets (7.5 mg) by mouth At Bedtime   Quantity:  30 tablet   Refills:  1            Where to get your medicines      These medications were sent to [facility] [address] [facility] [address]  [address] [facility] [address]     Phone:  [phone]     mirtazapine 15 MG tablet                Primary Care Provider Office Phone # Fax #    Chandrika Cordova [phone] [address] [facility] [address]        Equal Access to Services     MARYBETH SAN : Hadii alia jaffe hadasho Soomaali, waaxda luqadaha, qaybta kaalmada adeegyada, waxay rebeccain haymarty collado . So [facility] [phone].    ATENCIÓN: Si habla español, tiene a torres disposición servicios gratuitos de asistencia lingüística. Mundo al [phone].    We comply with applicable federal civil rights laws and [place] laws. We do not discriminate on the basis of race, color, national origin, age, disability, sex, sexual orientation, or gender identity.            Thank you!     Thank you for choosing [facility]  for your care. Our goal is always to provide you with excellent care. Hearing back from our patients is one way we can continue to improve our services. Please take a few minutes to complete the written survey that you may receive in the mail after your visit with us. Thank you!             Your Updated Medication List - Protect others around you: Learn how to safely use, store and throw away your medicines at www.disposemymeds.org.          This list is accurate as of 9/20/18   4:24 PM.  Always use your most recent med list.                   Brand Name Dispense Instructions for use Diagnosis    ALEVE 220 MG tablet   Generic drug:  naproxen sodium      Take 220 mg by mouth nightly as needed for moderate pain        REINALDO SEED PO      Take 1 teaspoonful by mouth daily        cholecalciferol 28856 units capsule    vitamin D3     Take 1 capsule by mouth daily.        co-enzyme Q-10 100 MG Caps capsule      Take  by mouth daily.        cyanocolbalamin 500 MCG tablet    vitamin  B-12     Take 500 mcg by mouth daily    Diarrhea, unspecified type       escitalopram 10 MG tablet    LEXAPRO    45 tablet    Take 0.5 tablets (5 mg) by mouth daily    Primary insomnia, Caregiver stress       Garlic 2000 MG Caps      Take  by mouth.        Ginger Root Powd      Take 1 Dose by mouth daily        lisinopril 10 MG tablet    PRINIVIL/ZESTRIL    90 tablet    Take 1 tablet (10 mg) by mouth daily    Hypertension goal BP (blood pressure) < 140/90       loratadine 10 MG tablet    CLARITIN    10 tablet    Take 1 tablet (10 mg) by mouth daily    Post-nasal drainage       magnesium 100 MG Caps      Take  by mouth.        mirtazapine 15 MG tablet    REMERON    30 tablet    Take 0.5 tablets (7.5 mg) by mouth At Bedtime    Other insomnia       polyethylene glycol powder    MIRALAX    510 g    Take 17 g (1 capful) by mouth daily    Gastrointestinal symptom       pravastatin 10 MG tablet    PRAVACHOL    90 tablet    Take 1 tablet (10 mg) by mouth daily    Hyperlipidemia LDL goal <130       RED YEAST RICE PO           UNABLE TO FIND      daily MEDICATION NAME: Super Vitamin B    Diarrhea, unspecified type

## 2018-09-20 NOTE — PROGRESS NOTES
SUBJECTIVE:   Bambi Feldman is a 89 year old male who presents to clinic today for the following health issues:    89 y/o M with CKD 3, djd, HTN here for trouble sleeping and back pain.   last month,  has had both knees injected and right hip trochanter.   He lives independently in a house with his daughter .  his daughter is Bipolar and he is caregiver.         Joint Pain on left hip back pain     Onset: 2 months     Description:   Location: left hip  Character: Burning    Intensity: moderate    Progression of Symptoms: same    Accompanying Signs & Symptoms:  Other symptoms: none    History:   Previous similar pain: no       Precipitating factors:   Trauma or overuse: no     Alleviating factors:  Improved by: acetaminophen like a alive     Therapies Tried and outcome: none             Problem list and histories reviewed & adjusted, as indicated.  Additional history: as documented    Patient Active Problem List   Diagnosis     Hypertension goal BP (blood pressure) < 140/90     Other malignant neoplasm of skin, site unspecified     HYPERLIPIDEMIA LDL GOAL <130     Advanced directives, counseling/discussion     Elevated serum creatinine     Primary osteoarthritis of both knees     Trochanteric bursitis of right hip     Chronic kidney disease, stage 3 (moderate)     Ganglion cyst of wrist, left     Past Surgical History:   Procedure Laterality Date     SUPRAPUBIC PROSTATECTOMY       SURGICAL HISTORY OF -       skin cancer right neck and lip       Social History   Substance Use Topics     Smoking status: Former Smoker     Quit date: 1/20/1970     Smokeless tobacco: Never Used     Alcohol use Yes      Comment: 1 drink per night      Family History   Problem Relation Age of Onset     Alzheimer Disease Sister      C.A.D. No family hx of      Diabetes No family hx of      Hypertension No family hx of      Cerebrovascular Disease No family hx of      Breast Cancer No family hx of      Cancer - colorectal No family hx of       Prostate Cancer No family hx of          Current Outpatient Prescriptions   Medication Sig Dispense Refill     cholecalciferol (VITAMIN D3) 80403 UNIT capsule Take 1 capsule by mouth daily.       cyanocolbalamin (VITAMIN  B-12) 500 MCG tablet Take 500 mcg by mouth daily       escitalopram (LEXAPRO) 10 MG tablet Take 0.5 tablets (5 mg) by mouth daily 45 tablet 1     Garlic 2000 MG CAPS Take  by mouth.       Ginger Root POWD Take 1 Dose by mouth daily       lisinopril (PRINIVIL/ZESTRIL) 10 MG tablet Take 1 tablet (10 mg) by mouth daily 90 tablet 3     loratadine (CLARITIN) 10 MG tablet Take 1 tablet (10 mg) by mouth daily 10 tablet 0     Magnesium 100 MG CAPS Take  by mouth.       mirtazapine (REMERON) 15 MG tablet Take 0.5 tablets (7.5 mg) by mouth At Bedtime 30 tablet 1     naproxen sodium (ALEVE) 220 MG tablet Take 220 mg by mouth nightly as needed for moderate pain       polyethylene glycol (MIRALAX) powder Take 17 g (1 capful) by mouth daily 510 g 3     pravastatin (PRAVACHOL) 10 MG tablet Take 1 tablet (10 mg) by mouth daily 90 tablet 3     UNABLE TO FIND daily MEDICATION NAME: Super Vitamin B       REINALDO SEED PO Take 1 teaspoonful by mouth daily       co-enzyme Q-10 (COQ10) 100 MG CAPS Take  by mouth daily.       Red Yeast Rice Extract (RED YEAST RICE PO)        Allergies   Allergen Reactions     Nkda [No Known Drug Allergies]      Recent Labs   Lab Test  03/01/18   0807  08/03/17   0916 07/31/17  03/10/16   1343  07/13/15   1417  01/19/15   1301   LDL  106*   --    --   128*  165*   --    HDL  58   --    --    --    --    --    TRIG  108   --    --    --    --    --    ALT  18  40   --    --    --    --    CR  1.11  1.11  1.57*  1.28*  1.30*   --    GFRESTIMATED  62  62  44  53*  52*   --    GFRESTBLACK  75  75   --   64  63   --    POTASSIUM  4.7  4.7   --   4.4  4.6   --    TSH   --    --   3.460   --    --   2.68      BP Readings from Last 3 Encounters:   09/20/18 106/65   08/27/18 123/69   08/08/18  126/65    Wt Readings from Last 3 Encounters:   09/20/18 165 lb (74.8 kg)   08/27/18 165 lb (74.8 kg)   08/08/18 168 lb (76.2 kg)                  Labs reviewed in EPIC    Reviewed and updated as needed this visit by clinical staff  Tobacco  Allergies  Meds  Med Hx  Surg Hx  Fam Hx  Soc Hx      Reviewed and updated as needed this visit by Provider         ROS:  Constitutional, HEENT, cardiovascular, pulmonary, gi and gu systems are negative, except as otherwise noted.  Some insomnia and low mood.  Wonders what he can take for that.      OBJECTIVE:     /65 (BP Location: Left arm, Patient Position: Chair, Cuff Size: Adult Regular)  Pulse 80  Temp 96.9  F (36.1  C) (Oral)  Wt 165 lb (74.8 kg)  SpO2 96%  BMI 25.84 kg/m2  Body mass index is 25.84 kg/(m^2).  GENERAL: alert, no distress, elderly and fatigued  EYES: Eyes grossly normal to inspection, PERRL and conjunctivae and sclerae normal  MS: no gross musculoskeletal defects noted, no edema  MS: stiff gait, age appropriate.   Has pain at right hip bursa to palpation.   SKIN: no suspicious lesions or rashes   :  NEURO: diminished strength and tone consistent with advanced age, sensory exam grossly normal and oriented times 3, yet is unable to recall he had right hip injection last month and both knees injected a couple months ago.   PSYCH: mentation appears normal, affect normal/ appropriate.  Expresses worry about his daughter who has bipolar disorder and on disability,  He lives with her.     Diagnostic Test Results:  Results for orders placed or performed in visit on 03/01/18   Basic metabolic panel  (Ca, Cl, CO2, Creat, Gluc, K, Na, BUN)   Result Value Ref Range    Sodium 132 (L) 133 - 144 mmol/L    Potassium 4.7 3.4 - 5.3 mmol/L    Chloride 98 94 - 109 mmol/L    Carbon Dioxide 27 20 - 32 mmol/L    Anion Gap 7 3 - 14 mmol/L    Glucose 101 (H) 70 - 99 mg/dL    Urea Nitrogen 15 7 - 30 mg/dL    Creatinine 1.11 0.66 - 1.25 mg/dL    GFR Estimate 62 >60  mL/min/1.7m2    GFR Estimate If Black 75 >60 mL/min/1.7m2    Calcium 9.1 8.5 - 10.1 mg/dL   Lipid panel reflex to direct LDL Fasting   Result Value Ref Range    Cholesterol 186 <200 mg/dL    Triglycerides 108 <150 mg/dL    HDL Cholesterol 58 >39 mg/dL    LDL Cholesterol Calculated 106 (H) <100 mg/dL    Non HDL Cholesterol 128 <130 mg/dL   AST   Result Value Ref Range    AST 16 0 - 45 U/L   ALT   Result Value Ref Range    ALT 18 0 - 70 U/L     This procedure has been fully reviewed with the patient and written informed consent has been obtained.  After cleaning area with betadine, a steroid injection was performed at  Right hip trochanter using 1% plain Lidocaine (1. cc) and 40 mg of K40 (1.  cc). This was well tolerated.    ASSESSMENT/PLAN:          ICD-10-CM    1. Other insomnia G47.09 mirtazapine (REMERON) 15 MG tablet   2. Trochanteric bursitis of right hip M70.61 TRIAMCINOLONE ACET INJ NOS     DRAIN/INJECT LARGE JOINT/BURSA       Declines care coordination.   Seems to struggle with some memory issues.     Is well kempt and came to appt on time (scheduled same day).   .   Trial mirtazipine for sleep and mood, return to clinic one month...        Jessa Alston MD  [facility]

## 2018-09-21 ENCOUNTER — TELEPHONE (OUTPATIENT)
Dept: FAMILY MEDICINE | Facility: CLINIC | Age: 83
End: 2018-09-21

## 2018-09-21 NOTE — TELEPHONE ENCOUNTER
"Received the following message from Dr Alston:    Call patient tomorrow, Friday, and ask how he is doing.  Remind him he got hip injection Thursday \"yesterday\"   "

## 2018-09-21 NOTE — TELEPHONE ENCOUNTER
Patient states he is feeling pretty good this morning.  States his hip feels a little better already this morning as well.  Marya Boone RN

## 2018-10-16 DIAGNOSIS — I10 HYPERTENSION GOAL BP (BLOOD PRESSURE) < 140/90: ICD-10-CM

## 2018-10-16 NOTE — TELEPHONE ENCOUNTER
"Requested Prescriptions   Pending Prescriptions Disp Refills     lisinopril (PRINIVIL/ZESTRIL) 10 MG tablet [Pharmacy Med Name: Lisinopril Oral Tablet 10 MG] 90 tablet 2    Last Written Prescription Date:  9/8/17  Last Fill Quantity: 90,  # refills: 3   Last office visit: 9/20/2018 with prescribing provider:     Future Office Visit:     Sig: TAKE ONE TABLET BY MOUTH ONE TIME DAILY    ACE Inhibitors (Including Combos) Protocol Passed    10/16/2018 10:59 AM       Passed - Blood pressure under 140/90 in past 12 months    BP Readings from Last 3 Encounters:   09/20/18 106/65   08/27/18 123/69   08/08/18 126/65                Passed - Recent (12 mo) or future (30 days) visit within the authorizing provider's specialty    Patient had office visit in the last 12 months or has a visit in the next 30 days with authorizing provider or within the authorizing provider's specialty.  See \"Patient Info\" tab in inbasket, or \"Choose Columns\" in Meds & Orders section of the refill encounter.           Passed - Patient is age 18 or older       Passed - Normal serum creatinine on file in past 12 months    Recent Labs   Lab Test  03/01/18   0807   CR  1.11            Passed - Normal serum potassium on file in past 12 months    Recent Labs   Lab Test  03/01/18   0807   POTASSIUM  4.7               "

## 2018-10-18 RX ORDER — LISINOPRIL 10 MG/1
TABLET ORAL
Qty: 90 TABLET | Refills: 0 | Status: SHIPPED | OUTPATIENT
Start: 2018-10-18 | End: 2018-12-13

## 2018-10-30 ENCOUNTER — OFFICE VISIT (OUTPATIENT)
Dept: FAMILY MEDICINE | Facility: CLINIC | Age: 83
End: 2018-10-30
Payer: COMMERCIAL

## 2018-10-30 VITALS
HEART RATE: 78 BPM | WEIGHT: 168 LBS | TEMPERATURE: 96.6 F | DIASTOLIC BLOOD PRESSURE: 67 MMHG | OXYGEN SATURATION: 95 % | SYSTOLIC BLOOD PRESSURE: 119 MMHG | BODY MASS INDEX: 26.31 KG/M2
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
## 2018-10-30 PROCEDURE — 90471 IMMUNIZATION ADMIN: CPT | Performed by: FAMILY MEDICINE

## 2018-10-30 PROCEDURE — 90715 TDAP VACCINE 7 YRS/> IM: CPT | Performed by: FAMILY MEDICINE

## 2018-10-30 PROCEDURE — 99213 OFFICE O/P EST LOW 20 MIN: CPT | Mod: 25 | Performed by: FAMILY MEDICINE

## 2018-10-30 NOTE — PATIENT INSTRUCTIONS
Dog Bite  A dog bite can cause a wound deep enough to break the skin. In such cases, the wound is cleaned and sometimes closed. If the wound is closed, it is usually not completely closed. This is so that fluid can drain if the wound becomes infected. Often, wounds will be left open to heal. In addition to wound care, a tetanus shot may be given, if needed.    Home care    Wash your hands well with soap and warm water before and after caring for the wound. This helps lower the risk of infection.    Care for the wound as directed. If a dressing was applied to the wound, be sure to change it as directed.    If the wound bleeds, place a clean, soft cloth on the wound. Then firmly apply pressure until the bleeding stops. This may take up to 5 minutes. Do not release the pressure and look at the wound during this time.    Most wounds heal within 10 days. But an infection can occur even with proper treatment. So be sure to check the wound daily for signs of infection (see below).    Antibiotics may be prescribed. These help prevent or treat infection. If you re given antibiotics, take them as directed. Also be sure to complete the medicines.  Rabies prevention  Rabies is a virus that can be carried in certain animals. These can include domestic animals such as dogs and cats. Pets fully vaccinated against rabies (2 shots) are at very low risk of infection. But because human rabies is almost always fatal, any biting pet should be confined for 10 days as an extra precaution. In general, if there is a risk for rabies, the following steps may need to be taken:    If someone s pet dog has bitten you, it should be kept in a secure area for the next 10 days to watch for signs of illness. (If the pet owner won t allow this, contact your local animal control center.) If the dog becomes ill or dies during that time, contact your local animal control center at once so the animal may be tested for rabies. If the dog stays healthy  for the next 10 days, there is no danger of rabies in the animal or you.  ? If a stray dog bit you, contact your local animal control center. They can give information on capture, quarantine, and animal rabies testing.  ? If you can t find the animal that bit you in the next 2 days, and if rabies exists in your area, you may need to receive the rabies vaccine series. Call your healthcare provider right away. Or, return to the emergency department promptly.  ? All animal bites should be reported to the local animal control center. If you were not given a form to fill out, you can report this yourself.  Follow-up care  Follow up with your healthcare provider, or as directed.  When to seek medical advice  Call your healthcare provider right away if any of these occur:    Signs of infection:  ? Spreading redness or warmth from the wound  ? Increased pain or swelling  ? Fever of 100.4 F (38 C) or higher, or as directed by your healthcare provider  ? Colored fluid or pus draining from the wound    Signs of rabies infection:  ? Headache  ? Confusion  ? Strange behavior  ? Increased salivating and drooling  ? Seizure    Decreased ability to move any body part near the wound    Bleeding that can't be stopped after 5 minutes of firm pressure  Date Last Reviewed: 3/1/2017    5403-7865 [employer]. [address] [place] [address], [place] [id number]. All rights reserved. This information is not intended as a substitute for professional medical care. Always follow your healthcare professional's instructions.

## 2018-10-30 NOTE — PROGRESS NOTES
SUBJECTIVE:   Bambi Feldman is a 89 year old male who presents to clinic today for the following health issues:      His dog bit him yesterday on left wrist -  Patient states that his dog is up to date on his shots.  The dog was not being particularly aggressive   It is his daughter Manan     The dog wanted something from him and he did not want to give it to the dog     The patient has not had any limitation of motion of the hand or forearm     O: /67 (BP Location: Right arm, Patient Position: Sitting, Cuff Size: Adult Regular)  Pulse 78  Temp 96.6  F (35.9  C) (Oral)  Wt 168 lb (76.2 kg)  SpO2 95%  BMI 26.31 kg/m2    Skin tear on the posterior of the the distal forearm above the wrist, midline   Still bleeding a little bit   No obvious deep puncture   No drainage   Affected area is about 2x3 cm       Last tetanus shot greater than 5 years ago       No redness up the arm   Normal movement of the elbow and the wrist and hand       ICD-10-CM    1. Dog bite, initial encounter W54.0XXA amoxicillin-clavulanate (AUGMENTIN) 875-125 MG per tablet       Not amenable to suturing due to length of time and superfical nature as well as this being a dog bite.   Problem list and histories reviewed & adjusted, as indicated.    Discussed taking the augmentin with food       Recheck in 1 week or if worsens

## 2018-10-30 NOTE — MR AVS SNAPSHOT
After Visit Summary   10/30/2018    Bambi Feldman    MRN: [id number]           Patient Information     Date Of Birth          12/23/1927        Visit Information        Provider Department      10/30/2018 11:40 AM Remy Alatorre MD [facility]        Today's Diagnoses     Dog bite, initial encounter    -  1      Care Instructions      Dog Bite  A dog bite can cause a wound deep enough to break the skin. In such cases, the wound is cleaned and sometimes closed. If the wound is closed, it is usually not completely closed. This is so that fluid can drain if the wound becomes infected. Often, wounds will be left open to heal. In addition to wound care, a tetanus shot may be given, if needed.    Home care    Wash your hands well with soap and warm water before and after caring for the wound. This helps lower the risk of infection.    Care for the wound as directed. If a dressing was applied to the wound, be sure to change it as directed.    If the wound bleeds, place a clean, soft cloth on the wound. Then firmly apply pressure until the bleeding stops. This may take up to 5 minutes. Do not release the pressure and look at the wound during this time.    Most wounds heal within 10 days. But an infection can occur even with proper treatment. So be sure to check the wound daily for signs of infection (see below).    Antibiotics may be prescribed. These help prevent or treat infection. If you re given antibiotics, take them as directed. Also be sure to complete the medicines.  Rabies prevention  Rabies is a virus that can be carried in certain animals. These can include domestic animals such as dogs and cats. Pets fully vaccinated against rabies (2 shots) are at very low risk of infection. But because human rabies is almost always fatal, any biting pet should be confined for 10 days as an extra precaution. In general, if there is a risk for rabies, the following steps may need to be  taken:    If someone s pet dog has bitten you, it should be kept in a secure area for the next 10 days to watch for signs of illness. (If the pet owner won t allow this, contact your local animal control center.) If the dog becomes ill or dies during that time, contact your local animal control center at once so the animal may be tested for rabies. If the dog stays healthy for the next 10 days, there is no danger of rabies in the animal or you.  ? If a stray dog bit you, contact your local animal control center. They can give information on capture, quarantine, and animal rabies testing.  ? If you can t find the animal that bit you in the next 2 days, and if rabies exists in your area, you may need to receive the rabies vaccine series. Call your healthcare provider right away. Or, return to the emergency department promptly.  ? All animal bites should be reported to the local animal control center. If you were not given a form to fill out, you can report this yourself.  Follow-up care  Follow up with your healthcare provider, or as directed.  When to seek medical advice  Call your healthcare provider right away if any of these occur:    Signs of infection:  ? Spreading redness or warmth from the wound  ? Increased pain or swelling  ? Fever of 100.4 F (38 C) or higher, or as directed by your healthcare provider  ? Colored fluid or pus draining from the wound    Signs of rabies infection:  ? Headache  ? Confusion  ? Strange behavior  ? Increased salivating and drooling  ? Seizure    Decreased ability to move any body part near the wound    Bleeding that can't be stopped after 5 minutes of firm pressure  Date Last Reviewed: 3/1/2017    7276-1306 [employer]. [address] [place] [address], [place] [address]. All rights reserved. This information is not intended as a substitute for professional medical care. Always follow your healthcare professional's instructions.                Follow-ups after your visit         Who to contact     If you have questions or need follow up information about today's clinic visit or your schedule please contact [facility] directly at [phone].  Normal or non-critical lab and imaging results will be communicated to you by MyChart, letter or phone within 4 business days after the clinic has received the results. If you do not hear from us within 7 days, please contact the clinic through MyChart or phone. If you have a critical or abnormal lab result, we will notify you by phone as soon as possible.  Submit refill requests through [employer] or call your pharmacy and they will forward the refill request to us. Please allow 3 business days for your refill to be completed.          Additional Information About Your Visit        Care EveryWhere ID     This is your Care EveryWhere ID. This could be used by other organizations to access your [facility] medical records  [id number]        Your Vitals Were     Pulse Temperature Pulse Oximetry BMI (Body Mass Index)          78 96.6  F (35.9  C) (Oral) 95% 26.31 kg/m2         Blood Pressure from Last 3 Encounters:   10/30/18 119/67   09/20/18 106/65   08/27/18 123/69    Weight from Last 3 Encounters:   10/30/18 168 lb (76.2 kg)   09/20/18 165 lb (74.8 kg)   08/27/18 165 lb (74.8 kg)              Today, you had the following     No orders found for display         Today's Medication Changes          These changes are accurate as of 10/30/18 12:26 PM.  If you have any questions, ask your nurse or doctor.               Start taking these medicines.        Dose/Directions    amoxicillin-clavulanate 875-125 MG per tablet   Commonly known as:  AUGMENTIN   Used for:  Dog bite, initial encounter   Started by:  Remy Alatorre MD        Dose:  1 tablet   Take 1 tablet by mouth 2 times daily   Quantity:  20 tablet   Refills:  0            Where to get your medicines      These medications were sent to [facility] [address] [place] [address]  - [address] [address] [place] [address]     Phone:  [phone]     amoxicillin-clavulanate 875-125 MG per tablet                Primary Care Provider Office Phone # Fax #    Chandrika Cordova [phone] [address] [facility] [address]        Equal Access to Services     MARYBETH SAN : Hadii aad ku hadasho Soomaali, waaxda luqadaha, qaybta kaalmada adeegyada, waxay idiin hayaan adeeg kharash la'lizbethn ah. So [facility] [phone].    ATENCIÓN: Si habla español, tiene a torres disposición servicios gratuitos de asistencia lingüística. Mundo al [phone].    We comply with applicable federal civil rights laws and [place] laws. We do not discriminate on the basis of race, color, national origin, age, disability, sex, sexual orientation, or gender identity.            Thank you!     Thank you for choosing [facility]  for your care. Our goal is always to provide you with excellent care. Hearing back from our patients is one way we can continue to improve our services. Please take a few minutes to complete the written survey that you may receive in the mail after your visit with us. Thank you!             Your Updated Medication List - Protect others around you: Learn how to safely use, store and throw away your medicines at www.disposemymeds.org.          This list is accurate as of 10/30/18 12:26 PM.  Always use your most recent med list.                   Brand Name Dispense Instructions for use Diagnosis    ALEVE 220 MG tablet   Generic drug:  naproxen sodium      Take 220 mg by mouth nightly as needed for moderate pain        amoxicillin-clavulanate 875-125 MG per tablet    AUGMENTIN    20 tablet    Take 1 tablet by mouth 2 times daily    Dog bite, initial encounter       REINALDO SEED PO      Take 1 teaspoonful by mouth daily        cholecalciferol 61492 units capsule    vitamin D3     Take 1 capsule by mouth daily.        co-enzyme Q-10 100 MG  Caps capsule      Take  by mouth daily.        cyanocolbalamin 500 MCG tablet    vitamin  B-12     Take 500 mcg by mouth daily    Diarrhea, unspecified type       escitalopram 10 MG tablet    LEXAPRO    45 tablet    Take 0.5 tablets (5 mg) by mouth daily    Primary insomnia, Caregiver stress       Garlic 2000 MG Caps      Take  by mouth.        Hanh Root Powd      Take 1 Dose by mouth daily        lisinopril 10 MG tablet    PRINIVIL/ZESTRIL    90 tablet    TAKE ONE TABLET BY MOUTH ONE TIME DAILY    Hypertension goal BP (blood pressure) < 140/90       loratadine 10 MG tablet    CLARITIN    10 tablet    Take 1 tablet (10 mg) by mouth daily    Post-nasal drainage       magnesium 100 MG Caps      Take  by mouth.        mirtazapine 15 MG tablet    REMERON    30 tablet    Take 0.5 tablets (7.5 mg) by mouth At Bedtime    Other insomnia       polyethylene glycol powder    MIRALAX    510 g    Take 17 g (1 capful) by mouth daily    Gastrointestinal symptom       pravastatin 10 MG tablet    PRAVACHOL    90 tablet    Take 1 tablet (10 mg) by mouth daily    Hyperlipidemia LDL goal <130       RED YEAST RICE PO           UNABLE TO FIND      daily MEDICATION NAME: Super Vitamin B    Diarrhea, unspecified type

## 2018-10-30 NOTE — NURSING NOTE
Screening Questionnaire for Adult Immunization    Are you sick today?   No   Do you have allergies to medications, food, a vaccine component or latex?   No   Have you ever had a serious reaction after receiving a vaccination?   No   Do you have a long-term health problem with heart disease, lung disease, asthma, kidney disease, metabolic disease (e.g. diabetes), anemia, or other blood disorder?   No   Do you have cancer, leukemia, HIV/AIDS, or any other immune system problem?   No   In the past 3 months, have you taken medications that affect  your immune system, such as prednisone, other steroids, or anticancer drugs; drugs for the treatment of rheumatoid arthritis, Crohn s disease, or psoriasis; or have you had radiation treatments?   No   Have you had a seizure, or a brain or other nervous system problem?   No   During the past year, have you received a transfusion of blood or blood     products, or been given immune (gamma) globulin or antiviral drug?   No   For women: Are you pregnant or is there a chance you could become        pregnant during the next month?   No   Have you received any vaccinations in the past 4 weeks?   No     Immunization questionnaire answers were all negative.      Patient instructed to remain in clinic for 15 minutes afterwards, and to report any adverse reaction to me immediately.    Vaccine information supplied.     Screening performed by [employer] on 10/30/2018 at 1:07 PM.

## 2018-12-13 ENCOUNTER — OFFICE VISIT (OUTPATIENT)
Dept: FAMILY MEDICINE | Facility: CLINIC | Age: 83
End: 2018-12-13
Payer: COMMERCIAL

## 2018-12-13 VITALS
BODY MASS INDEX: 27.1 KG/M2 | WEIGHT: 173 LBS | DIASTOLIC BLOOD PRESSURE: 84 MMHG | SYSTOLIC BLOOD PRESSURE: 155 MMHG | HEART RATE: 79 BPM | OXYGEN SATURATION: 95 % | TEMPERATURE: 97.6 F

## 2018-12-13 DIAGNOSIS — M17.0 PRIMARY OSTEOARTHRITIS OF BOTH KNEES: Primary | ICD-10-CM

## 2018-12-13 DIAGNOSIS — G47.09 OTHER INSOMNIA: ICD-10-CM

## 2018-12-13 DIAGNOSIS — I10 HYPERTENSION GOAL BP (BLOOD PRESSURE) < 140/90: ICD-10-CM

## 2018-12-13 PROCEDURE — 20610 DRAIN/INJ JOINT/BURSA W/O US: CPT | Mod: 50 | Performed by: FAMILY MEDICINE

## 2018-12-13 PROCEDURE — 99214 OFFICE O/P EST MOD 30 MIN: CPT | Mod: 25 | Performed by: FAMILY MEDICINE

## 2018-12-13 RX ORDER — TRIAMCINOLONE ACETONIDE 40 MG/ML
40 INJECTION, SUSPENSION INTRA-ARTICULAR; INTRAMUSCULAR ONCE
Status: COMPLETED | OUTPATIENT
Start: 2018-12-13 | End: 2018-12-13

## 2018-12-13 RX ORDER — MIRTAZAPINE 15 MG/1
TABLET, FILM COATED ORAL
Qty: 30 TABLET | Refills: 11 | Status: SHIPPED | OUTPATIENT
Start: 2018-12-13 | End: 2019-12-26

## 2018-12-13 RX ORDER — LISINOPRIL 10 MG/1
10 TABLET ORAL DAILY
Qty: 90 TABLET | Refills: 3 | Status: SHIPPED | OUTPATIENT
Start: 2018-12-13 | End: 2019-12-31

## 2018-12-13 RX ADMIN — TRIAMCINOLONE ACETONIDE 40 MG: 40 INJECTION, SUSPENSION INTRA-ARTICULAR; INTRAMUSCULAR at 14:03

## 2018-12-13 ASSESSMENT — PAIN SCALES - GENERAL: PAINLEVEL: EXTREME PAIN (8)

## 2018-12-13 NOTE — PROGRESS NOTES
SUBJECTIVE:                                                    Bambi Feldman is a 89 year old male who presents to clinic today for the following health issues:  Patient with end-stage osteoarthritis of both of his knees.  Comes in today reports he has been having increased pain in his knees, especially his right knee.  He also has been difficult for him to walk he has been using a cane.  He did have x-rays previously, His x-ray from January of last year showed she does have moderate to severe joint space narrowing with moderate to severe osteoarthritis of both of his knees   she also has history of hypertension, he does have problems sleeping he does need a prescription for his blood pressure medication and his Remeron.  He reports that at night he does take a full dose of his Remeron to help him sleep.    He reports he has been taking Aleve for his knees pain  Need a form filled for his HandiCap sticker.  Joint Pain    Onset: A year ago    Description:   Location: left knee and right knee  Character: Sharp and Fullness    Intensity: severe, 8/10    Progression of Symptoms: same    Accompanying Signs & Symptoms:  Other symptoms: radiation of pain to right leg    History:   Previous similar pain: YES      Precipitating factors:   Trauma or overuse: no, not sure     Alleviating factors:  Improved by: Walking    Therapies Tried and outcome: Aleve, not helpful        Patient would like handicap parking sticker and needs refill on Mirtazapine.    Problem list and histories reviewed & adjusted, as indicated.  Additional history: as documented    Patient Active Problem List   Diagnosis     Hypertension goal BP (blood pressure) < 140/90     Other malignant neoplasm of skin, site unspecified     HYPERLIPIDEMIA LDL GOAL <130     Advanced directives, counseling/discussion     Elevated serum creatinine     Primary osteoarthritis of both knees     Trochanteric bursitis of right hip     Chronic kidney disease, stage 3  (moderate) (H)     Ganglion cyst of wrist, left     Past Surgical History:   Procedure Laterality Date     SUPRAPUBIC PROSTATECTOMY       SURGICAL HISTORY OF -       skin cancer right neck and lip       Social History     Tobacco Use     Smoking status: Former Smoker     Last attempt to quit: 1970     Years since quittin.9     Smokeless tobacco: Never Used   Substance Use Topics     Alcohol use: Yes     Comment: 1 drink per night      Family History   Problem Relation Age of Onset     Alzheimer Disease Sister      C.A.D. No family hx of      Diabetes No family hx of      Hypertension No family hx of      Cerebrovascular Disease No family hx of      Breast Cancer No family hx of      Cancer - colorectal No family hx of      Prostate Cancer No family hx of          Current Outpatient Medications   Medication Sig Dispense Refill     amoxicillin-clavulanate (AUGMENTIN) 875-125 MG per tablet Take 1 tablet by mouth 2 times daily 20 tablet 0     REINALDO SEED PO Take 1 teaspoonful by mouth daily       cholecalciferol (VITAMIN D3) 21776 UNIT capsule Take 1 capsule by mouth daily.       co-enzyme Q-10 (COQ10) 100 MG CAPS Take  by mouth daily.       cyanocolbalamin (VITAMIN  B-12) 500 MCG tablet Take 500 mcg by mouth daily       diclofenac (VOLTAREN) 1 % topical gel Apply 4 grams to knees qid as needed 100 g 3     escitalopram (LEXAPRO) 10 MG tablet Take 0.5 tablets (5 mg) by mouth daily 45 tablet 1     Garlic 2000 MG CAPS Take  by mouth.       Ginger Root POWD Take 1 Dose by mouth daily       lisinopril (PRINIVIL/ZESTRIL) 10 MG tablet Take 1 tablet (10 mg) by mouth daily 90 tablet 3     loratadine (CLARITIN) 10 MG tablet Take 1 tablet (10 mg) by mouth daily 10 tablet 0     Magnesium 100 MG CAPS Take  by mouth.       mirtazapine (REMERON) 15 MG tablet One tab at bedtime as needed 30 tablet 11     naproxen sodium (ALEVE) 220 MG tablet Take 220 mg by mouth nightly as needed for moderate pain       polyethylene glycol  (MIRALAX) powder Take 17 g (1 capful) by mouth daily 510 g 3     pravastatin (PRAVACHOL) 10 MG tablet Take 1 tablet (10 mg) by mouth daily 90 tablet 3     Red Yeast Rice Extract (RED YEAST RICE PO)        UNABLE TO FIND daily MEDICATION NAME: Super Vitamin B       Allergies   Allergen Reactions     Nkda [No Known Drug Allergies]        ROS:  Constitutional, HEENT, cardiovascular, pulmonary, gi and gu systems are negative, except as otherwise noted.    OBJECTIVE:     /84 (BP Location: Left arm, Patient Position: Chair, Cuff Size: Adult Regular)   Pulse 79   Temp 97.6  F (36.4  C) (Oral)   Wt 78.5 kg (173 lb)   SpO2 95%   BMI 27.10 kg/m    Body mass index is 27.1 kg/m .  GENERAL: healthy, alert and no distress  MS: no gross musculoskeletal defects noted, no edema  Knee exam bilaterally: no swelling around knee joint, no redness, mild stiffness with full extension and flexion.    Diagnostic Test Results:  none     ASSESSMENT/PLAN:       ICD-10-CM    1. Primary osteoarthritis of both knees M17.0 diclofenac (VOLTAREN) 1 % topical gel     triamcinolone (KENALOG-40) injection 40 mg     triamcinolone (KENALOG-40) injection 40 mg     DRAIN/INJECT LARGE JOINT/BURSA   2. Other insomnia G47.09 mirtazapine (REMERON) 15 MG tablet   3. Hypertension goal BP (blood pressure) < 140/90 I10 lisinopril (PRINIVIL/ZESTRIL) 10 MG tablet     #1 osteoarthritis of both knees: Patient reports he has been taking Aleve for with no benefit.  After obtained consent from the patient did use sterile technique.  I did inject both of his knees using 40 mg Kenalog.  With  3 cc of 2% lidocaine.  I did use it for each knee.  Patient tolerated the procedure.  #2 hypertension, I renewed his prescription.    3.  History of insomnia I did renew his mirtazapine he reported take 1 tablet at bedtime.  I did discuss with him possible side effects the medication.    I did fill his form for handicap sticker.  Follow up with PCP in 6 moths.    Purnima  SILVERIO Nassar MD  [facility]

## 2019-03-21 ENCOUNTER — OFFICE VISIT (OUTPATIENT)
Dept: FAMILY MEDICINE | Facility: CLINIC | Age: 84
End: 2019-03-21
Payer: COMMERCIAL

## 2019-03-21 ENCOUNTER — ANCILLARY PROCEDURE (OUTPATIENT)
Dept: GENERAL RADIOLOGY | Facility: CLINIC | Age: 84
End: 2019-03-21
Attending: FAMILY MEDICINE
Payer: COMMERCIAL

## 2019-03-21 VITALS
SYSTOLIC BLOOD PRESSURE: 132 MMHG | DIASTOLIC BLOOD PRESSURE: 63 MMHG | OXYGEN SATURATION: 96 % | TEMPERATURE: 97 F | BODY MASS INDEX: 27.72 KG/M2 | WEIGHT: 177 LBS | HEART RATE: 84 BPM

## 2019-03-21 DIAGNOSIS — G89.29 CHRONIC RIGHT-SIDED LOW BACK PAIN WITH RIGHT-SIDED SCIATICA: ICD-10-CM

## 2019-03-21 DIAGNOSIS — M79.661 PAIN OF RIGHT LOWER LEG: ICD-10-CM

## 2019-03-21 DIAGNOSIS — M54.41 CHRONIC RIGHT-SIDED LOW BACK PAIN WITH RIGHT-SIDED SCIATICA: ICD-10-CM

## 2019-03-21 DIAGNOSIS — M79.661 PAIN OF RIGHT LOWER LEG: Primary | ICD-10-CM

## 2019-03-21 DIAGNOSIS — K59.00 CONSTIPATION, UNSPECIFIED CONSTIPATION TYPE: ICD-10-CM

## 2019-03-21 PROCEDURE — 73502 X-RAY EXAM HIP UNI 2-3 VIEWS: CPT

## 2019-03-21 PROCEDURE — 72100 X-RAY EXAM L-S SPINE 2/3 VWS: CPT

## 2019-03-21 PROCEDURE — 99214 OFFICE O/P EST MOD 30 MIN: CPT | Performed by: FAMILY MEDICINE

## 2019-03-21 RX ORDER — DICLOFENAC SODIUM 75 MG/1
75 TABLET, DELAYED RELEASE ORAL 2 TIMES DAILY
Qty: 60 TABLET | Refills: 0 | Status: SHIPPED | OUTPATIENT
Start: 2019-03-21 | End: 2020-01-21

## 2019-03-21 RX ORDER — AMOXICILLIN 250 MG
1-2 CAPSULE ORAL DAILY
Qty: 30 TABLET | Refills: 0 | Status: SHIPPED | OUTPATIENT
Start: 2019-03-21 | End: 2020-01-21

## 2019-03-21 NOTE — LETTER
[facility]   [address] [place]  [address]  [phone]                                   March 22, 2019    [facility]  [address] [facility] [address]        Dear Bambi,    There seems to be some indications of spasm in the back.  No evidence of fracture of the lumbar spine.  Multiple areas where the disc space is narrowed as we discussed.  Seems to be worse compared to previous films.  These are age-related degenerative changes.    Results for orders placed or performed in visit on 03/21/19   XR Lumbar Spine 2/3 Views    Narrative    XR LUMBAR SPINE 2-3 VIEWS 3/21/2019 12:05 PM    COMPARISON: 12/31/2014    HISTORY: Back and leg pain.      Impression    IMPRESSION: Straightening and slight reversal the normal lumbar  lordosis is again seen. Vertebral heights are preserved without  evidence of fracture. Multilevel disc space loss again seen and  slightly worsened since comparison study. No listhesis identified.    TED CRUZ MD       If you have any questions please call the clinic at [phone]    Sincerely,    Remy Alatorre MD   bmd

## 2019-03-21 NOTE — PROGRESS NOTES
SUBJECTIVE:   Bambi Feldman is a 89 year old male who presents to clinic today for the following health issues:    Bilat Knee pain follow up - Has gotten Steroid injection in the past.    Current Outpatient Medications   Medication Sig Dispense Refill     REINALDO SEED PO Take 1 teaspoonful by mouth daily       cholecalciferol (VITAMIN D3) 95945 UNIT capsule Take 1 capsule by mouth daily.       co-enzyme Q-10 (COQ10) 100 MG CAPS Take  by mouth daily.       cyanocolbalamin (VITAMIN  B-12) 500 MCG tablet Take 500 mcg by mouth daily       diclofenac (VOLTAREN) 1 % topical gel Apply 4 grams to knees qid as needed 100 g 3     escitalopram (LEXAPRO) 10 MG tablet Take 0.5 tablets (5 mg) by mouth daily 45 tablet 1     Garlic 2000 MG CAPS Take  by mouth.       lisinopril (PRINIVIL/ZESTRIL) 10 MG tablet Take 1 tablet (10 mg) by mouth daily 90 tablet 3     loratadine (CLARITIN) 10 MG tablet Take 1 tablet (10 mg) by mouth daily 10 tablet 0     Magnesium 100 MG CAPS Take  by mouth.       mirtazapine (REMERON) 15 MG tablet One tab at bedtime as needed 30 tablet 11     naproxen sodium (ALEVE) 220 MG tablet Take 220 mg by mouth nightly as needed for moderate pain       polyethylene glycol (MIRALAX) powder Take 17 g (1 capful) by mouth daily 510 g 3     pravastatin (PRAVACHOL) 10 MG tablet Take 1 tablet (10 mg) by mouth daily 90 tablet 3     Red Yeast Rice Extract (RED YEAST RICE PO)        UNABLE TO FIND daily MEDICATION NAME: Super Vitamin B           Constipation     Onset: x 1 yr    Description:   Frequency of bowel movements: diminished .  Stool consistency: hard    Progression of Symptoms:  same    Accompanying Signs & Symptoms:  Abdominal pain (cramping?): no   Blood in stool: no   Rectal pain: no   Nausea/vomiting: no   Weight loss or gain: no    History:   History of abdominal surgery: Hx of prostate surgery    Precipitating factors:   Recent use of narcotics, anticholinergics, calcium channel blockers, antacids, or iron  supplements: no   Chronic Laxative Use: ?         Therapies Tried and outcome: Miralax and Clearlax - Helps      Actually complaining about both back pain right hip pain and right knee pain.  Is currently taking no arthritis type medication.  Patient has not had these areas imaged in a while.  Patient has pain with movement as of his hip.  He walks with a limp  He complains of bilateral knee pain      O: /63 (BP Location: Right arm, Patient Position: Sitting, Cuff Size: Adult Regular)   Pulse 84   Temp 97  F (36.1  C) (Oral)   Wt 80.3 kg (177 lb)   SpO2 96%   BMI 27.72 kg/m      Patient is in no acute distress  His low back has no tenderness to percussion  Patient does have paraspinal muscle tenderness bilaterally  Patient has negative straight leg raising bilaterally  Hip x-ray shows moderate degenerative changes  Knee x-ray has minimal arthritic changes.        ICD-10-CM    1. Pain of right lower leg M79.661 XR Hip Right 2-3 Views     XR Lumbar Spine 2/3 Views     diclofenac (VOLTAREN) 75 MG EC tablet     Basic metabolic panel   2. Chronic right-sided low back pain with right-sided sciatica M54.41 XR Hip Right 2-3 Views    G89.29 XR Lumbar Spine 2/3 Views     diclofenac (VOLTAREN) 75 MG EC tablet   3. Constipation, unspecified constipation type K59.00 senna-docusate (SENOKOT-S/PERICOLACE) 8.6-50 MG tablet     I think the patient's pain is coming primarily from his back.  He has significant osteoarthritis with bridging osteophytes bilaterally he has some shifting of his spine and a scoliotic pattern in the lowest part of the spine.  We discussed risks involved with taking NSAIDs, and decided to check his kidney function in 2 weeks after being on diclofenac.  He is to take the minimal amount that controls his symptoms.    He should start taking Senokot in addition to MiraLAX on a daily basis and increase the MiraLAX to 1 capful twice daily.  His x-rays show significant stool in the abdomen.

## 2019-03-21 NOTE — LETTER
[facility]   [address] [place]  [address]  [phone]                                   March 22, 2019    [facility]  [address] [facility] [address]        Dear Bambi,    X-ray of the hip shows moderate right hip osteoarthritis with mild joint space narrowing.    Results for orders placed or performed in visit on 03/21/19   XR Hip Right 2-3 Views    Narrative    XR HIP RIGHT 2-3 VIEWS 3/21/2019 12:04 PM    COMPARISON: None.    HISTORY: Pain.      Impression    IMPRESSION: Moderate RIGHT hip osteoarthritis with mild joint space  loss. No fractures are seen.    TED CRUZ MD       If you have any questions please call the clinic at [phone]    Sincerely,    Remy Alatorre MD  bmd

## 2019-03-22 NOTE — RESULT ENCOUNTER NOTE
There seems to be some indications of spasm in the back.  No evidence of fracture of the lumbar spine.  Multiple areas where the disc space is narrowed as we discussed.  Seems to be worse compared to previous films.  These are age-related degenerative changes.

## 2019-03-27 ENCOUNTER — OFFICE VISIT (OUTPATIENT)
Dept: FAMILY MEDICINE | Facility: CLINIC | Age: 84
End: 2019-03-27
Payer: COMMERCIAL

## 2019-03-27 VITALS
DIASTOLIC BLOOD PRESSURE: 71 MMHG | TEMPERATURE: 96.7 F | SYSTOLIC BLOOD PRESSURE: 138 MMHG | HEART RATE: 80 BPM | BODY MASS INDEX: 28.04 KG/M2 | OXYGEN SATURATION: 98 % | WEIGHT: 179 LBS

## 2019-03-27 DIAGNOSIS — Z87.828 H/O LACERATION OF SKIN: Primary | ICD-10-CM

## 2019-03-27 PROCEDURE — 99213 OFFICE O/P EST LOW 20 MIN: CPT | Performed by: FAMILY MEDICINE

## 2019-03-27 NOTE — PROGRESS NOTES
SUBJECTIVE:   Bambi Feldman is a 89 year old male who presents to clinic today for the following health issues:    Sore on his right arm x 2 weeks that wont heal.  Patient with history of thinning skin,  reports  has a superficial skin laceration on his right forearm for the past 2 weeks.  He reports he does not bleed he reports he has been putting triple antibiotic and been covering it and is not healing.    He has no redness no swelling no discharges and no bleeding.  Has no fever no chills.  Denies any pain.    Patient is not shoulder, is not over how he injured it.    Problem list and histories reviewed & adjusted, as indicated.  Additional history: as documented    Patient Active Problem List   Diagnosis     Hypertension goal BP (blood pressure) < 140/90     Other malignant neoplasm of skin, site unspecified     HYPERLIPIDEMIA LDL GOAL <130     Advanced directives, counseling/discussion     Elevated serum creatinine     Primary osteoarthritis of both knees     Trochanteric bursitis of right hip     Chronic kidney disease, stage 3 (moderate) (H)     Ganglion cyst of wrist, left     Past Surgical History:   Procedure Laterality Date     SUPRAPUBIC PROSTATECTOMY       SURGICAL HISTORY OF -       skin cancer right neck and lip       Social History     Tobacco Use     Smoking status: Former Smoker     Last attempt to quit: 1970     Years since quittin.2     Smokeless tobacco: Never Used   Substance Use Topics     Alcohol use: Yes     Comment: 1 drink per night      Family History   Problem Relation Age of Onset     Alzheimer Disease Sister      C.A.D. No family hx of      Diabetes No family hx of      Hypertension No family hx of      Cerebrovascular Disease No family hx of      Breast Cancer No family hx of      Cancer - colorectal No family hx of      Prostate Cancer No family hx of          Current Outpatient Medications   Medication Sig Dispense Refill     REINALDO SEED PO Take 1 teaspoonful by mouth  daily       cholecalciferol (VITAMIN D3) 26050 UNIT capsule Take 1 capsule by mouth daily.       co-enzyme Q-10 (COQ10) 100 MG CAPS Take  by mouth daily.       cyanocolbalamin (VITAMIN  B-12) 500 MCG tablet Take 500 mcg by mouth daily       diclofenac (VOLTAREN) 1 % topical gel Apply 4 grams to knees qid as needed 100 g 3     diclofenac (VOLTAREN) 75 MG EC tablet Take 1 tablet (75 mg) by mouth 2 times daily 60 tablet 0     escitalopram (LEXAPRO) 10 MG tablet Take 0.5 tablets (5 mg) by mouth daily 45 tablet 1     Garlic 2000 MG CAPS Take  by mouth.       lisinopril (PRINIVIL/ZESTRIL) 10 MG tablet Take 1 tablet (10 mg) by mouth daily 90 tablet 3     loratadine (CLARITIN) 10 MG tablet Take 1 tablet (10 mg) by mouth daily 10 tablet 0     Magnesium 100 MG CAPS Take  by mouth.       mirtazapine (REMERON) 15 MG tablet One tab at bedtime as needed 30 tablet 11     naproxen sodium (ALEVE) 220 MG tablet Take 220 mg by mouth nightly as needed for moderate pain       polyethylene glycol (MIRALAX) powder Take 17 g (1 capful) by mouth daily 510 g 3     pravastatin (PRAVACHOL) 10 MG tablet Take 1 tablet (10 mg) by mouth daily 90 tablet 3     Red Yeast Rice Extract (RED YEAST RICE PO)        senna-docusate (SENOKOT-S/PERICOLACE) 8.6-50 MG tablet Take 1-2 tablets by mouth daily 30 tablet 0     UNABLE TO FIND daily MEDICATION NAME: Super Vitamin B         Reviewed and updated as needed this visit by clinical staff  Tobacco  Allergies  Meds  Med Hx  Surg Hx  Fam Hx  Soc Hx      Reviewed and updated as needed this visit by Provider         ROS:  Constitutional, HEENT, cardiovascular, pulmonary, gi and gu systems are negative, except as otherwise noted.    OBJECTIVE:     /71 (BP Location: Right arm, Patient Position: Sitting, Cuff Size: Adult Regular)   Pulse 80   Temp 96.7  F (35.9  C) (Oral)   Wt 81.2 kg (179 lb)   SpO2 98%   BMI 28.04 kg/m    Body mass index is 28.04 kg/m .  GENERAL: healthy, alert and no  distress  SKIN: right forearm area: an open superficial wound, with no bleeding, no redness, area bout the size of 5 cm by 2-3 cm.    Diagnostic Test Results:  none     ASSESSMENT/PLAN:       ICD-10-CM    1. H/O laceration of skin Z87.828      advised pt. To continue with triple antiobicts , and daily wash with Hydrogen peroxide, keep it open a few hours a day. Cover if outside to avoid infections.    There are no Patient Instructions on file for this visit.    Purnima Nassar MD  [facility]

## 2019-04-19 DIAGNOSIS — M79.661 PAIN OF RIGHT LOWER LEG: ICD-10-CM

## 2019-04-19 LAB
ANION GAP SERPL CALCULATED.3IONS-SCNC: 6 MMOL/L (ref 3–14)
BUN SERPL-MCNC: 20 MG/DL (ref 7–30)
CALCIUM SERPL-MCNC: 8.7 MG/DL (ref 8.5–10.1)
CHLORIDE SERPL-SCNC: 103 MMOL/L (ref 94–109)
CO2 SERPL-SCNC: 26 MMOL/L (ref 20–32)
CREAT SERPL-MCNC: 1.36 MG/DL (ref 0.66–1.25)
GFR SERPL CREATININE-BSD FRML MDRD: 45 ML/MIN/{1.73_M2}
GLUCOSE SERPL-MCNC: 121 MG/DL (ref 70–99)
POTASSIUM SERPL-SCNC: 4.3 MMOL/L (ref 3.4–5.3)
SODIUM SERPL-SCNC: 135 MMOL/L (ref 133–144)

## 2019-04-19 PROCEDURE — 80048 BASIC METABOLIC PNL TOTAL CA: CPT | Performed by: FAMILY MEDICINE

## 2019-04-19 PROCEDURE — 36415 COLL VENOUS BLD VENIPUNCTURE: CPT | Performed by: FAMILY MEDICINE

## 2019-04-19 NOTE — LETTER
[facility]   [address] [place]  [address]  [phone]                                   April 23, 2019    Bambi Feldman  [address] [facility] [address]        Dear Bambi,    Your electrolytes are normal.  Glucose moderately elevated.  Kidney function test is starting to go up again.  Your numbers can go up and down for this.  Keep your fluid intake up to keep this normal.  I would probably repeat blood work for glucose was in 3 months to make sure that this level is not rising.  This was a nonfasting specimen that was taken this result was probably normal.  Recommend fasting lab work for glucose in the future.    Results for orders placed or performed in visit on 04/19/19   Basic metabolic panel   Result Value Ref Range    Sodium 135 133 - 144 mmol/L    Potassium 4.3 3.4 - 5.3 mmol/L    Chloride 103 94 - 109 mmol/L    Carbon Dioxide 26 20 - 32 mmol/L    Anion Gap 6 3 - 14 mmol/L    Glucose 121 (H) 70 - 99 mg/dL    Urea Nitrogen 20 7 - 30 mg/dL    Creatinine 1.36 (H) 0.66 - 1.25 mg/dL    GFR Estimate 45 (L) >60 mL/min/[1.73_m2]    GFR Estimate If Black 52 (L) >60 mL/min/[1.73_m2]    Calcium 8.7 8.5 - 10.1 mg/dL       If you have any questions please call the clinic at [phone]    Sincerely,    Remy Alatorre MD  bmd

## 2019-04-22 ENCOUNTER — OFFICE VISIT (OUTPATIENT)
Dept: FAMILY MEDICINE | Facility: CLINIC | Age: 84
End: 2019-04-22
Payer: COMMERCIAL

## 2019-04-22 VITALS
DIASTOLIC BLOOD PRESSURE: 70 MMHG | HEART RATE: 80 BPM | SYSTOLIC BLOOD PRESSURE: 141 MMHG | TEMPERATURE: 96.8 F | OXYGEN SATURATION: 96 % | BODY MASS INDEX: 27.88 KG/M2 | WEIGHT: 178 LBS

## 2019-04-22 DIAGNOSIS — H02.103 ECTROPION DUE TO LAXITY OF RIGHT EYELID: Primary | ICD-10-CM

## 2019-04-22 PROCEDURE — 99213 OFFICE O/P EST LOW 20 MIN: CPT | Performed by: FAMILY MEDICINE

## 2019-04-22 NOTE — RESULT ENCOUNTER NOTE
Your electrolytes are normal.  Glucose moderately elevated.  Kidney function test is starting to go up again.  Your numbers can go up and down for this.  Keep your fluid intake up to keep this normal.  I would probably repeat blood work for glucose was in 3 months to make sure that this level is not rising.  This was a nonfasting specimen that was taken this result was probably normal.  Recommend fasting lab work for glucose in the future.

## 2019-04-22 NOTE — PROGRESS NOTES
SUBJECTIVE:   Bambi Feldman is a 89 year old male who presents to clinic today for the following   health issues:  Patient is having pain in the back and in the knee   He has not been taking the diclofenac that was prescribed   He had a fall and had a skin tear on the right forearm   He cannot tell me why he fell     He states he has been having memory problems   He is able to drive   He has not gotten lost   He is able to accomplish ADL's without help     When asked why he did not fill his diclofenac he told me it was because he forgot that we prescribed it.        Patient is here for an office visit but unsure what it is for.    Past Medical History:   Diagnosis Date     BPH (benign prostatic hyperplasia)      Elevated cholesterol      HTN (hypertension)      Other malignant neoplasm of skin, site unspecified     Non-melanoma skin cancer       Past Surgical History:   Procedure Laterality Date     SUPRAPUBIC PROSTATECTOMY       SURGICAL HISTORY OF -       skin cancer right neck and lip       Family History   Problem Relation Age of Onset     Alzheimer Disease Sister      C.A.D. No family hx of      Diabetes No family hx of      Hypertension No family hx of      Cerebrovascular Disease No family hx of      Breast Cancer No family hx of      Cancer - colorectal No family hx of      Prostate Cancer No family hx of        Social History     Tobacco Use     Smoking status: Former Smoker     Last attempt to quit: 1970     Years since quittin.2     Smokeless tobacco: Never Used   Substance Use Topics     Alcohol use: Yes     Comment: 1 drink per night      O: /70 (BP Location: Left arm, Patient Position: Chair, Cuff Size: Adult Regular)   Pulse 80   Temp 96.8  F (36  C) (Oral)   Wt 80.7 kg (178 lb)   SpO2 96%   BMI 27.88 kg/m      bp is borderline on repeat   Patient has a healed skin tear on the right forearm     Patient is able to answer all questions    He walks very stiff legged in short steps      Per previous xray patient has significant arthritis     Right ear has clear discharge   He has an ectropion   He sates this is new, but it appears chronic       ICD-10-CM    1. 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    At this point I would not assess risk of falls any further, but with his stiff walking, if he has another fall I would consider referral to physical therapy for assessment

## 2019-07-23 ENCOUNTER — OFFICE VISIT (OUTPATIENT)
Dept: FAMILY MEDICINE | Facility: CLINIC | Age: 84
End: 2019-07-23
Payer: COMMERCIAL

## 2019-07-23 VITALS
DIASTOLIC BLOOD PRESSURE: 60 MMHG | WEIGHT: 174 LBS | TEMPERATURE: 97.3 F | SYSTOLIC BLOOD PRESSURE: 136 MMHG | HEIGHT: 67 IN | BODY MASS INDEX: 27.31 KG/M2 | HEART RATE: 84 BPM

## 2019-07-23 DIAGNOSIS — M70.61 TROCHANTERIC BURSITIS OF RIGHT HIP: Primary | ICD-10-CM

## 2019-07-23 DIAGNOSIS — M17.0 PRIMARY OSTEOARTHRITIS OF BOTH KNEES: ICD-10-CM

## 2019-07-23 PROCEDURE — 99214 OFFICE O/P EST MOD 30 MIN: CPT | Performed by: FAMILY MEDICINE

## 2019-07-23 ASSESSMENT — MIFFLIN-ST. JEOR: SCORE: 1402.89

## 2019-07-23 NOTE — PROGRESS NOTES
Subjective     Bambi Feldman is a 89 year old male who presents to clinic today for the following health issues:    HPI   Joint Pain    Onset: several years    Description:   Location: Bilateral hips and knees - right side worse for both knee and hips  Character: Sharp    Intensity: severe    Progression of Symptoms: worse    Accompanying Signs & Symptoms:  Other symptoms: none    History:   Previous similar pain: YES      Precipitating factors:   Trauma or overuse: no     Alleviating factors:  Improved by: using cane to walk    Therapies Tried and outcome: aleve is only very short term relief    Patient was seen for this in March and got imaging done (see below).  Has seen ortho in the past and had both steroid injections and Synvisc injections.  Has tried oral and topical NSAIDs with little relief.  Has been getting stomach upset from taking two Aleve at bedtime.  Hasn't maxed out his Tylenol usage.  He's considering medical marijuana.        Patient Active Problem List   Diagnosis     Hypertension goal BP (blood pressure) < 140/90     Other malignant neoplasm of skin, site unspecified     HYPERLIPIDEMIA LDL GOAL <130     Advanced directives, counseling/discussion     Elevated serum creatinine     Primary osteoarthritis of both knees     Trochanteric bursitis of right hip     Chronic kidney disease, stage 3 (moderate) (H)     Ganglion cyst of wrist, left     Past Surgical History:   Procedure Laterality Date     SUPRAPUBIC PROSTATECTOMY       SURGICAL HISTORY OF -       skin cancer right neck and lip       Social History     Tobacco Use     Smoking status: Former Smoker     Last attempt to quit: 1970     Years since quittin.5     Smokeless tobacco: Never Used   Substance Use Topics     Alcohol use: Yes     Comment: 1 drink per night      Family History   Problem Relation Age of Onset     Alzheimer Disease Sister      C.A.D. No family hx of      Diabetes No family hx of      Hypertension No family hx of   "    Cerebrovascular Disease No family hx of      Breast Cancer No family hx of      Cancer - colorectal No family hx of      Prostate Cancer No family hx of            Reviewed and updated as needed this visit by Provider         Review of Systems   ROS COMP: Constitutional, HEENT, cardiovascular, pulmonary, gi and gu systems are negative, except as otherwise noted.      Objective    /60 (Cuff Size: Adult Regular)   Pulse 84   Temp 97.3  F (36.3  C) (Oral)   Ht 1.702 m (5' 7\")   Wt 78.9 kg (174 lb)   BMI 27.25 kg/m    Body mass index is 27.25 kg/m .  Physical Exam   GENERAL: healthy, alert and no distress  MS: Bilateral knees with deformities consistent with chronic OA.  Both knees with mild effusions and pain with flexion.  Mild joint line tenderness.  Bilateral hips with extremely limited external rotation and pain with motion.  +TTP along right trochanteric bursa.      Diagnostic Test Results:    XR HIP RIGHT 2-3 VIEWS 3/21/2019 12:04 PM  COMPARISON: None.  HISTORY: Pain.                                                             IMPRESSION: Moderate RIGHT hip osteoarthritis with mild joint space  loss. No fractures are seen.  TED CRUZ MD    KNEE BILATERAL THREE VIEWS  1/24/2017 1:54 PM   HISTORY: Bilateral primary osteoarthritis of knee.  COMPARISON: April 11, 2012.                                                               IMPRESSION: A total of six radiographs submitted for review, three of  each knee.  1. Moderate to severe joint space narrowing in the medial compartment  of the right knee, comparable to previous exam. Small right knee  effusion. Mild patellar osteophytes.  2. Moderate to severe joint space narrowing in the medial left knee  compartment with small knee effusion and mild patellar osteophytes. No  acute fracture.  LUISA GREGORIO MD        Assessment & Plan       ICD-10-CM    1. Trochanteric bursitis of right hip M70.61    2. Primary osteoarthritis of both knees M17.0  "     Patient has failed conservative treatment with oral and topical NSAIDs.  Corticosteroid and Synvisc injections have not been helpful in the past.  Advised maximizing his use of Tylenol and cutting down on NSAID use given that it sound like he is experiencing gastritis and also has CKD.  Patient is interested in trying medical marijuana, which I'm not sure he'd be a candidate for given his age.  I agree with him that medical marijuana would likely be favored to using narcotics in his age.  He will return after about a month to consider getting certified for medical marijuana if the Tylenol is not helpful.  I strongly advised physical therapy, but he declines their services.      Return in about 4 weeks (around 8/20/2019).    Marysol Islas,   [facility]

## 2019-07-23 NOTE — PATIENT INSTRUCTIONS
Tylenol 1000mg three times daily   Naproxen (Aleve) 220mg twice daily   Dr. Cordova does medical marijuana certification (however this can be expensive)

## 2019-08-29 ENCOUNTER — OFFICE VISIT (OUTPATIENT)
Dept: FAMILY MEDICINE | Facility: CLINIC | Age: 84
End: 2019-08-29
Payer: COMMERCIAL

## 2019-08-29 ENCOUNTER — TELEPHONE (OUTPATIENT)
Dept: FAMILY MEDICINE | Facility: CLINIC | Age: 84
End: 2019-08-29

## 2019-08-29 VITALS
DIASTOLIC BLOOD PRESSURE: 56 MMHG | WEIGHT: 176.6 LBS | TEMPERATURE: 97.7 F | HEART RATE: 80 BPM | SYSTOLIC BLOOD PRESSURE: 124 MMHG | BODY MASS INDEX: 27.72 KG/M2 | HEIGHT: 67 IN

## 2019-08-29 DIAGNOSIS — M17.0 PRIMARY OSTEOARTHRITIS OF BOTH KNEES: Primary | ICD-10-CM

## 2019-08-29 DIAGNOSIS — M70.61 TROCHANTERIC BURSITIS OF RIGHT HIP: ICD-10-CM

## 2019-08-29 PROCEDURE — 99214 OFFICE O/P EST MOD 30 MIN: CPT | Performed by: FAMILY MEDICINE

## 2019-08-29 ASSESSMENT — MIFFLIN-ST. JEOR: SCORE: 1414.68

## 2019-08-29 ASSESSMENT — PAIN SCALES - GENERAL: PAINLEVEL: EXTREME PAIN (8)

## 2019-08-29 NOTE — TELEPHONE ENCOUNTER
Reached out to patient to relay below message from PCP with understanding voiced.     Marli Padgett RN

## 2019-08-29 NOTE — TELEPHONE ENCOUNTER
Patient's son, Godfrey called and updated  E-mail address per Dr Arellano's request.    Marilu Billy  Patient Representative

## 2019-08-29 NOTE — PROGRESS NOTES
"Subjective 2    Bambi Feldman is a 89 year old male who presents to clinic today for the following health issues:    HPI     Patient is here today to discuss medical cannabis for arthritic pain.   Patient has experienced arthritis in knees for the past 7-8 years, which has gotten worse over the past 2 years. Hip pain followed soon after the initial knee pain.   His last x-ray was taken in 2017.   In the past has attempted multiple steroid and synvisc injections, as well as Tylenol/Aleve with little relief.  Patient does not feel like he could handle a knee-surgery recovery due to his age.       BP Readings from Last 3 Encounters:   08/29/19 124/56   07/23/19 136/60   04/22/19 141/70    Wt Readings from Last 3 Encounters:   08/29/19 80.1 kg (176 lb 9.6 oz)   07/23/19 78.9 kg (174 lb)   04/22/19 80.7 kg (178 lb)               Review of Systems   ROS COMP: Constitutional, HEENT, cardiovascular, pulmonary, gi and gu systems are negative, except as otherwise noted.    This document serves as a record of the services and decisions personally performed and made by Chandrika Cordova DO. It was created on her behalf by Jin Garcia, a trained medical scribe. The creation of this document is based on the provider's statements to the medical scribe.  Jin Garcia August 29, 2019 9:14 AM          Objective    /56 (BP Location: Right arm, Patient Position: Chair, Cuff Size: Adult Regular)   Pulse 80   Temp 97.7  F (36.5  C) (Oral)   Ht 1.702 m (5' 7\")   Wt 80.1 kg (176 lb 9.6 oz)   BMI 27.66 kg/m    Body mass index is 27.66 kg/m .  Physical Exam   GENERAL: healthy, alert and no distress  HENT: ear canals and TM's normal, nose and mouth without ulcers or lesions  RESP: lungs clear to auscultation - no rales, rhonchi or wheezes  CV: regular rate and rhythm, normal S1 S2, no S3 or S4, no murmur, click or rub, no peripheral edema and peripheral pulses strong  MS: no edema; efusion bilat, crepitus bilat, no joint line tenderness " "  PSYCH: mentation appears normal, affect normal/bright      Diagnostic Test Results:  Labs reviewed in Epic  No results found for this or any previous visit (from the past 24 hour(s)).        Assessment & Plan     1. Primary osteoarthritis of both knees  Patient has experienced arthritis in knees for the past 7-8 years, which has worsened. He has tried multiple steroid and synvisc injections, as well as Tylenol/Aleve with little relief. Patient does not feel like he could handle knee-surgery recovery due to his age.    2. Trochanteric bursitis of right hip  Patient has experienced hip pain for the past 7-8 years. He has tried Tylenol and Aleve with little relief.    Patient called back with the email he was certified later the same day.     BMI:   Estimated body mass index is 27.66 kg/m  as calculated from the following:    Height as of this encounter: 1.702 m (5' 7\").    Weight as of this encounter: 80.1 kg (176 lb 9.6 oz).           Patient Instructions   You are certified for medical marijuana to treat your arthritis pain.     You will need to call back with an e-mail address so that I can finish setting up an account for your medical marijuana.       I recommend trying Wobenzym, an over-the-counter medication, to help with arthritis pain. Although this can be difficult to find, you can try coops.       Return in about 1 year (around 8/29/2020), or if symptoms worsen or fail to improve.    The information in this document, created by the medical scribe, Jin Garcia, for me, accurately reflects the services I personally performed and the decisions made by me. I have reviewed and approved this document for accuracy prior to leaving the patient care area.    Chandrika Cordova, DO  [facility]        "

## 2019-08-29 NOTE — PATIENT INSTRUCTIONS
I have prescribed medical marijuana to treat your arthritis pain.     You will need to call back with an e-mail address so that I can finish setting up an account for your medical marijuana.       I recommend trying Wobenzym, an over-the-counter medication, to help with arthritis pain. Although this can be difficult to find, you can try coops.

## 2019-10-09 ENCOUNTER — TELEPHONE (OUTPATIENT)
Dept: FAMILY MEDICINE | Facility: CLINIC | Age: 84
End: 2019-10-09

## 2019-10-09 NOTE — TELEPHONE ENCOUNTER
RN called patient, relayed provider's recommendation to follow up with Dr. Cordova regarding pain. Patient verbalized understanding.     Routed to Dr. Cordova.    Vandana Oakes RN, BSN, PHN  [facility]: [place]

## 2019-10-09 NOTE — TELEPHONE ENCOUNTER
Reason for call:  Patient reporting a symptom    Symptom or request: Patient is requesting a message be placed to Dr Alston. TC sees that she has seen patient in the past even though Dr Cordova is listed as primary. Patient states that he is suffering with his arthritis in both knees and hips. He states that he only gets about 4 hours of sleep per night because he is in so much pain. He is able to walk with his cane. He is wondering if there is any medication that Dr Alston could prescribe to help with the pain.     Duration (how long have symptoms been present): for about the last 7-8 months    Have you been treated for this before? Yes    Additional comments: Patient uses [facility]    Phone Number patient can be reached at:  Home number on file [phone] (home)    Best Time:  any    Can we leave a detailed message on this number:  YES    Call taken on 10/9/2019 at 9:50 AM by Gema Berry

## 2019-10-09 NOTE — TELEPHONE ENCOUNTER
"Review of chart implies he is seeing Dr Vogel for pain, recenty approved for medical cannibis     I would like for him to redirect this question to Dr Vogel, perhaps the trial did not work, etc.  Instructions at the 8/29/19 visit : \"Return in about 1 year (around 8/29/2020), or if symptoms worsen or fail to improve\"      "

## 2019-10-10 NOTE — TELEPHONE ENCOUNTER
I spoke to patient and his daughter. Patient decided that he will go to the dispensary and have his marijuana adjusted.  Narayan Mei RN

## 2019-10-10 NOTE — TELEPHONE ENCOUNTER
This patient could go see Ortho for his knee pain, he could come in and get steroid injections which I do, he could go to the dispensary and have his marijuana adjusted, I do not know him well so I do not know everything he has tried in the past.  He is requesting medications like tramadol he will need to make an appointment.    Chandrika Cordova DO

## 2019-10-11 ENCOUNTER — IMMUNIZATION (OUTPATIENT)
Dept: NURSING | Facility: CLINIC | Age: 84
End: 2019-10-11
Payer: COMMERCIAL

## 2019-10-11 DIAGNOSIS — Z23 NEED FOR PROPHYLACTIC VACCINATION AND INOCULATION AGAINST INFLUENZA: Primary | ICD-10-CM

## 2019-10-11 PROCEDURE — 90662 IIV NO PRSV INCREASED AG IM: CPT

## 2019-10-11 PROCEDURE — G0008 ADMIN INFLUENZA VIRUS VAC: HCPCS

## 2019-10-11 PROCEDURE — 99207 ZZC NO CHARGE NURSE ONLY: CPT

## 2019-10-15 ENCOUNTER — TELEPHONE (OUTPATIENT)
Dept: FAMILY MEDICINE | Facility: CLINIC | Age: 84
End: 2019-10-15

## 2019-10-15 DIAGNOSIS — I10 HYPERTENSION GOAL BP (BLOOD PRESSURE) < 140/90: Primary | ICD-10-CM

## 2019-10-15 DIAGNOSIS — E78.5 HYPERLIPIDEMIA LDL GOAL <130: ICD-10-CM

## 2019-10-15 NOTE — TELEPHONE ENCOUNTER
I spoke to patient. He states that he is due for a cholesterol and a glucose test.  Please advise.  Thank you,  Narayan Mei RN

## 2019-10-15 NOTE — TELEPHONE ENCOUNTER
Reason for Call: Request for an order or referral:    Order or referral being requested:  Orders     Date needed: as soon as possible    Has the patient been seen by the PCP for this problem? YES    Additional comments:  Patient needing orders for labs    Phone number Patient can be reached at:  Home number on file [phone] (home)    Best Time:   Anytime     Can we leave a detailed message on this number?  YES    Call taken on 10/15/2019 at 8:58 AM by Kelin Ching

## 2019-10-17 DIAGNOSIS — I10 HYPERTENSION GOAL BP (BLOOD PRESSURE) < 140/90: ICD-10-CM

## 2019-10-17 DIAGNOSIS — E78.5 HYPERLIPIDEMIA LDL GOAL <130: ICD-10-CM

## 2019-10-17 PROCEDURE — 80048 BASIC METABOLIC PNL TOTAL CA: CPT | Performed by: FAMILY MEDICINE

## 2019-10-17 PROCEDURE — 80061 LIPID PANEL: CPT | Performed by: FAMILY MEDICINE

## 2019-10-17 PROCEDURE — 36415 COLL VENOUS BLD VENIPUNCTURE: CPT | Performed by: FAMILY MEDICINE

## 2019-10-18 LAB
ANION GAP SERPL CALCULATED.3IONS-SCNC: 6 MMOL/L (ref 3–14)
BUN SERPL-MCNC: 16 MG/DL (ref 7–30)
CALCIUM SERPL-MCNC: 8.8 MG/DL (ref 8.5–10.1)
CHLORIDE SERPL-SCNC: 104 MMOL/L (ref 94–109)
CHOLEST SERPL-MCNC: 224 MG/DL
CO2 SERPL-SCNC: 25 MMOL/L (ref 20–32)
CREAT SERPL-MCNC: 1.24 MG/DL (ref 0.66–1.25)
GFR SERPL CREATININE-BSD FRML MDRD: 50 ML/MIN/{1.73_M2}
GLUCOSE SERPL-MCNC: 94 MG/DL (ref 70–99)
HDLC SERPL-MCNC: 35 MG/DL
LDLC SERPL CALC-MCNC: 153 MG/DL
NONHDLC SERPL-MCNC: 189 MG/DL
POTASSIUM SERPL-SCNC: 4.8 MMOL/L (ref 3.4–5.3)
SODIUM SERPL-SCNC: 135 MMOL/L (ref 133–144)
TRIGL SERPL-MCNC: 180 MG/DL

## 2019-11-04 ENCOUNTER — HEALTH MAINTENANCE LETTER (OUTPATIENT)
Age: 84
End: 2019-11-04

## 2019-11-20 ENCOUNTER — NURSE TRIAGE (OUTPATIENT)
Dept: FAMILY MEDICINE | Facility: CLINIC | Age: 84
End: 2019-11-20

## 2019-11-20 NOTE — TELEPHONE ENCOUNTER
Reason for Call:  Other call back and     Detailed comments: knee pain due to arthritis is getting worse, patient would like to know what can be done.    Phone Number Patient can be reached at: Home number on file [phone] (home)    Best Time: anytime    Can we leave a detailed message on this number? YES    Call taken on 11/20/2019 at 11:44 AM by Laura Naik

## 2019-11-20 NOTE — TELEPHONE ENCOUNTER
Patient/family was instructed to return call to [facility], directly on the RN Call back line at [phone].    Ajit Perkins RN

## 2019-11-20 NOTE — TELEPHONE ENCOUNTER
"  Answer Assessment - Initial Assessment Questions  1. LOCATION and RADIATION: \"Where is the pain located?\"       *No Answer*  2. QUALITY: \"What does the pain feel like?\"  (e.g., sharp, dull, aching, burning)      *No Answer*  3. SEVERITY: \"How bad is the pain?\" \"What does it keep you from doing?\"   (Scale 1-10; or mild, moderate, severe)    -  MILD (1-3): doesn't interfere with normal activities     -  MODERATE (4-7): interferes with normal activities (e.g., work or school) or awakens from sleep, limping     -  SEVERE (8-10): excruciating pain, unable to do any normal activities, unable to walk      *No Answer*  4. ONSET: \"When did the pain start?\" \"Does it come and go, or is it there all the time?\"      *No Answer*  5. RECURRENT: \"Have you had this pain before?\" If so, ask: \"When, and what happened then?\"      *No Answer*  6. SETTING: \"Has there been any recent work, exercise or other activity that involved that part of the body?\"       *No Answer*  7. AGGRAVATING FACTORS: \"What makes the knee pain worse?\" (e.g., walking, climbing stairs, running)      *No Answer*  8. ASSOCIATED SYMPTOMS: \"Is there any swelling or redness of the knee?\"      *No Answer*  9. OTHER SYMPTOMS: \"Do you have any other symptoms?\" (e.g., chest pain, difficulty breathing, fever, calf pain)      *No Answer*  10. PREGNANCY: \"Is there any chance you are pregnant?\" \"When was your last menstrual period?\"        *No Answer*    Protocols used: KNEE PAIN-A-OH    "

## 2019-11-20 NOTE — TELEPHONE ENCOUNTER
Route to PCP. Please see note below. I recommended appt within 3 days but wanted to route to you before appt is scheduled. Patient said the medical marijuana helped but he did not know how to refill it. Could you please advise on this process? Please read assessment below and advise if you would like to see patient again.         Called and spoke with patient. He states that pain goes up in to both hips. The pain has worsened in the last 6 months due to the damp weather. He did try the medical marijuana that was prescribed, and it did help but he didn't think he had refills. Took it at 9 pm and helped all night long. Has a hard time sleeping now, the pain wakes him up at night. Taking aleve, helps for a couple of hours.     Describes the pain as 8.5-9/10 pain, sharp and burning. No new redness or swelling. Movement makes it worse. Walks with 2 canes, has been for 7-8 months. He initially said the pain has worsened since his visit with Dr. Cordova, but when I asked him how long ago the worsening started, he said 8-9 months. When I clarified with him, he said the pain is not worse since he saw Dr. Cordova.     Additional Information    Negative: Sounds like a life-threatening emergency to the triager    Negative: Followed a knee injury    Negative: Swollen knee joint and fever    Negative: Thigh or calf pain and only 1 side and present > 1 hour    Negative: Thigh or calf swelling and only 1 side    Negative: Can't move swollen joint at all    Negative: SEVERE pain (e.g., excruciating, unable to walk)    Negative: Very swollen joint    Negative: Painful rash with multiple small blisters grouped together (i.e., dermatomal distribution or 'band' or 'stripe')    Negative: Looks like a boil, infected sore, deep ulcer, or other infected rash (spreading redness, pus)    MODERATE pain (e.g., symptoms interfere with work or school, limping) and present > 3 days    Swollen knee joint (no fever or redness)    Negative:  Fluid-filled sack just below knee cap  (no fever or redness)    MILD knee pain persists > 7 days    Protocols used: KNEE PAIN-A-OH    Advised patient that if pain becomes severe, more than when last seen, or is unable to walk due to pain, should be seen right away. Should also be seen right away if increased swelling, redness, fever etc.     Ajit Perkins RN

## 2019-11-21 NOTE — TELEPHONE ENCOUNTER
Patient left message on RN MARCUS to call back.    Patient called back and information was passed on to him. He is somewhat frustrated that he has to go to the dispensary to get his dosing of medical marijuana.  But states that is does work very well, patient just got done with a walk.  He will go back when able and see if he can get a longer supply so he doesn't have to go as frequently.    He does not want to go to ortho as he doesn't believe they could do much for him at 89 years old.    Bambi gave phone to his daughter to pass information about specific OTC medication that he could try per last notes 8/29. Wobenzym was listed as an anti inflammatory that could be used, suggested they call local coop /pharmacy to check availability.    Victoria Saucedo RN

## 2019-11-21 NOTE — TELEPHONE ENCOUNTER
Called patient at [phone]. Left message with female who answered and stated he was not home to return call to nurse triage line at [phone].    Lori Swan RN

## 2019-11-21 NOTE — TELEPHONE ENCOUNTER
You do not refill medical marijuana like a normal prescription.  He just needs to return to the dispensary where he got it to purchase more.  This is a relatively new patient to me.  I can send him to Ortho if he would like to further work-up his hip pain.    Chandrika Cordova DO

## 2019-12-26 DIAGNOSIS — G47.09 OTHER INSOMNIA: ICD-10-CM

## 2019-12-26 RX ORDER — MIRTAZAPINE 15 MG/1
TABLET, FILM COATED ORAL
Qty: 30 TABLET | Refills: 8 | Status: SHIPPED | OUTPATIENT
Start: 2019-12-26 | End: 2020-09-15

## 2019-12-26 NOTE — TELEPHONE ENCOUNTER
"Requested Prescriptions   Pending Prescriptions Disp Refills     mirtazapine (REMERON) 15 MG tablet 30 tablet 11     Sig: One tab at bedtime as needed   Last Written Prescription Date:  12/13/18  Last Fill Quantity: 30,  # refills: 11   Last office visit: 8/29/2019 with prescribing provider:     Future Office Visit:        Atypical Antidepressants Protocol Passed - 12/26/2019  3:03 PM        Passed - Recent (12 mo) or future (30 days) visit within the authorizing provider's specialty     Patient has had an office visit with the authorizing provider or a provider within the authorizing providers department within the previous 12 mos or has a future within next 30 days. See \"Patient Info\" tab in inbasket, or \"Choose Columns\" in Meds & Orders section of the refill encounter.              Passed - Medication active on med list        Passed - Patient is age 18 or older          "

## 2019-12-26 NOTE — TELEPHONE ENCOUNTER
Prescription approved per [facility] Refill Protocol.    Vandana Oakes, RN, BSN, PHN  [facility]

## 2019-12-30 ENCOUNTER — TELEPHONE (OUTPATIENT)
Dept: FAMILY MEDICINE | Facility: CLINIC | Age: 84
End: 2019-12-30

## 2019-12-30 NOTE — TELEPHONE ENCOUNTER
Patient's daughter (Marissa) has been having diarrhea for the past week, everyday multiple times per day.    No fever, cramping and gas, no history of bowel obstruction but has frequent constipation.has a small hemorrhoid but no blood in stool. Has tried imodium but not helpful.     Have not been eating anything abnormal.  No illness recently, no fever, no chest pain or palpationations.      Eat [place] diet, increase fluids, electrolytes, soup broth.    Appointment made for 12/31 at 3:40pm with Dr Skinner.  Appointment on 1/6 was cancelled.    Victoria Saucedo RN

## 2019-12-30 NOTE — TELEPHONE ENCOUNTER
Reason for call:  Patient reporting a symptom    Symptom or request: Up set stomach     Duration (how long have symptoms been present): 2-3-days     Have you been treated for this before? No    Additional comments: None    Phone Number patient can be reached at:  Home number on file [phone] (home)    Best Time:  Anytime    Can we leave a detailed message on this number:  YES    Call taken on 12/30/2019 at 8:43 AM by Keerthi Wood

## 2019-12-31 ENCOUNTER — OFFICE VISIT (OUTPATIENT)
Dept: FAMILY MEDICINE | Facility: CLINIC | Age: 84
End: 2019-12-31
Payer: COMMERCIAL

## 2019-12-31 VITALS
DIASTOLIC BLOOD PRESSURE: 64 MMHG | SYSTOLIC BLOOD PRESSURE: 130 MMHG | HEART RATE: 76 BPM | WEIGHT: 173 LBS | HEIGHT: 67 IN | BODY MASS INDEX: 27.15 KG/M2 | TEMPERATURE: 97.6 F

## 2019-12-31 DIAGNOSIS — R19.7 DIARRHEA, UNSPECIFIED TYPE: ICD-10-CM

## 2019-12-31 DIAGNOSIS — M17.0 PRIMARY OSTEOARTHRITIS OF BOTH KNEES: ICD-10-CM

## 2019-12-31 DIAGNOSIS — I10 HYPERTENSION GOAL BP (BLOOD PRESSURE) < 140/90: Primary | ICD-10-CM

## 2019-12-31 PROCEDURE — 99214 OFFICE O/P EST MOD 30 MIN: CPT | Performed by: FAMILY MEDICINE

## 2019-12-31 RX ORDER — GARLIC 180 MG
TABLET, DELAYED RELEASE (ENTERIC COATED) ORAL
COMMUNITY

## 2019-12-31 RX ORDER — SODIUM PHOSPHATE,MONO-DIBASIC 19G-7G/118
500 ENEMA (ML) RECTAL DAILY
COMMUNITY

## 2019-12-31 RX ORDER — TRAMADOL HYDROCHLORIDE 50 MG/1
50 TABLET ORAL EVERY 6 HOURS PRN
Qty: 30 TABLET | Refills: 0 | Status: SHIPPED | OUTPATIENT
Start: 2019-12-31 | End: 2021-09-17

## 2019-12-31 RX ORDER — AMOXICILLIN 500 MG
1200 CAPSULE ORAL DAILY
COMMUNITY

## 2019-12-31 RX ORDER — LISINOPRIL 10 MG/1
10 TABLET ORAL DAILY
Qty: 90 TABLET | Refills: 1 | Status: SHIPPED | OUTPATIENT
Start: 2019-12-31 | End: 2020-07-27

## 2019-12-31 ASSESSMENT — MIFFLIN-ST. JEOR: SCORE: 1393.35

## 2019-12-31 NOTE — PROGRESS NOTES
"Subjective     Bambi Feldman is a 89 year old male who presents to clinic today for the following health issues:     Patient is present with his daughter today.     HPI   Diarrhea  Onset: 1 week ago    Description:   Consistency of stool: watery and loose, but since yesterday stools have become more formed, and patient had constipation this morning, was having 3-4 loose stools a day  Blood in stool: no   Number of loose stools in past 24 hours: 3    Progression of Symptoms:  improving    Accompanying Signs & Symptoms:  Fever: no   Nausea or vomiting; no   Abdominal pain: YES, and patient has been using Tums and that helps  Episodes of constipation: YES, yesterday  Weight loss: no     History:   Ill contacts: no   Recent use of antibiotics: no    Recent travels: no          Recent medication-new or changes(Rx or OTC): YES- move free joint health, wobenzym, and tylenol 8 hr    Precipitating factors:   Adding blueberries to oatmeal    Alleviating factors:   Eating bland foods    Therapies Tried and outcome:  Imodium AD; Outcome: did not help    Bowel movements began to solidify yesterday after changing diet to \"bland.\" He had an upset stomach but is unsure whether he would classify these as nauseas. No other family members with diarrhea symptoms. Denies symptoms of chest pain or shortness of breath. Back to eating normal amount of food without lack of appetite at this time.     Chronic Knee Pain   He reports that after trying medical cannabis he noted only short term relief of chronic knee pain, it was hard to get to the dispensary often and the treatment was expensive. Only able to sleep about 5-6 hours a night due to pain. Has done steroid injections with little to no relief of pain. Doesn't believe that he has tried tramadol. Has not had a total knee arthroplasty procedure.     BP Readings from Last 3 Encounters:   12/31/19 130/64   08/29/19 124/56   07/23/19 136/60    Wt Readings from Last 3 Encounters:   12/31/19 " "78.5 kg (173 lb)   08/29/19 80.1 kg (176 lb 9.6 oz)   07/23/19 78.9 kg (174 lb)         Reviewed and updated as needed this visit by Provider       Review of Systems   ROS COMP: Constitutional, HEENT, cardiovascular, pulmonary, GI, , musculoskeletal, neuro, skin, endocrine and psych systems are negative, except as otherwise noted.    This document serves as a record of the services and decisions personally performed and made by Chandrika Cordova DO. It was created on her behalf by Carlton Hoffman, a trained medical scribe. The creation of this document is based on the provider's statements to the medical scribe.  Carlton Hoffman 4:06 PM December 31, 2019      Objective    /64 (BP Location: Right arm, Patient Position: Chair, Cuff Size: Adult Regular)   Pulse 76   Temp 97.6  F (36.4  C) (Oral)   Ht 1.702 m (5' 7\")   Wt 78.5 kg (173 lb)   BMI 27.10 kg/m    Body mass index is 27.1 kg/m .  Physical Exam   GENERAL: healthy, alert and no distress  HENT: mouth without ulcers or lesions  NECK: no adenopathy, no asymmetry, masses, or scars and thyroid normal to palpation  RESP: lungs clear to auscultation - no rales, rhonchi or wheezes  CV: regular rate and rhythm, normal S1 S2, no S3 or S4, no murmur, click or rub, no peripheral edema  ABDOMEN: soft, nontender, no hepatosplenomegaly, no masses and bowel sounds normal  MS: mild tenderness to palpation of the knees bilaterally, no edema  SKIN: no suspicious lesions or rashes to visible skin   NEURO: Normal strength and tone, mentation intact and speech normal  PSYCH: mentation appears normal, affect normal/bright      Assessment & Plan       ICD-10-CM    1. Hypertension goal BP (blood pressure) < 140/90 I10 lisinopril (PRINIVIL/ZESTRIL) 10 MG tablet   2. Diarrhea, unspecified type R19.7    3. Primary osteoarthritis of both knees M17.0 traMADol (ULTRAM) 50 MG tablet   Diarrhea appears to be resolving for him. No labs necessary in my opinion. Blood pressure controlled " within acceptable limits after recheck, continue current treatment. Got relief with medical cannabis treatment, plans to continue and add low dose tramadol in the evenings to see if this helps with pain. Not enthusiastic about a total knee arthroplasty procedure at his age.      Patient Instructions   Take one tramadol in the evenings to see if this helps with the knee pain.       Return in about 6 months (around 6/30/2020) for Lab Work, Routine Visit.    The information in this document, created by the medical scribe for me, accurately reflects the services I personally performed and the decisions made by me. I have reviewed and approved this document for accuracy prior to leaving the patient care area.  December 31, 2019 4:18 PM     Chandrika Cordova DO  [facility]

## 2020-01-21 ENCOUNTER — NURSE TRIAGE (OUTPATIENT)
Dept: FAMILY MEDICINE | Facility: CLINIC | Age: 85
End: 2020-01-21

## 2020-01-21 ENCOUNTER — OFFICE VISIT (OUTPATIENT)
Dept: FAMILY MEDICINE | Facility: CLINIC | Age: 85
End: 2020-01-21
Payer: COMMERCIAL

## 2020-01-21 VITALS
TEMPERATURE: 97.4 F | OXYGEN SATURATION: 97 % | SYSTOLIC BLOOD PRESSURE: 118 MMHG | DIASTOLIC BLOOD PRESSURE: 61 MMHG | WEIGHT: 169 LBS | BODY MASS INDEX: 26.53 KG/M2 | HEIGHT: 67 IN | HEART RATE: 78 BPM

## 2020-01-21 DIAGNOSIS — I10 HYPERTENSION GOAL BP (BLOOD PRESSURE) < 140/90: ICD-10-CM

## 2020-01-21 DIAGNOSIS — N18.30 CHRONIC KIDNEY DISEASE, STAGE 3 (MODERATE): ICD-10-CM

## 2020-01-21 DIAGNOSIS — R19.7 DIARRHEA OF PRESUMED INFECTIOUS ORIGIN: Primary | ICD-10-CM

## 2020-01-21 PROCEDURE — 99214 OFFICE O/P EST MOD 30 MIN: CPT | Performed by: INTERNAL MEDICINE

## 2020-01-21 PROCEDURE — 87177 OVA AND PARASITES SMEARS: CPT | Performed by: INTERNAL MEDICINE

## 2020-01-21 PROCEDURE — 87209 SMEAR COMPLEX STAIN: CPT | Performed by: INTERNAL MEDICINE

## 2020-01-21 PROCEDURE — 87493 C DIFF AMPLIFIED PROBE: CPT | Mod: 59 | Performed by: INTERNAL MEDICINE

## 2020-01-21 PROCEDURE — 87506 IADNA-DNA/RNA PROBE TQ 6-11: CPT | Performed by: INTERNAL MEDICINE

## 2020-01-21 ASSESSMENT — MIFFLIN-ST. JEOR: SCORE: 1375.21

## 2020-01-21 NOTE — TELEPHONE ENCOUNTER
"RN spoke with daughter, Pau with patient's approval.    BP 81/97 was taken last night. BP not taken this morning.     Reports diarrhea every 3-4 hours. Slightly lightheaded when standing. Has been drinking tea and broth, and toast. Water.     RN instructed patient to take BP. Will await call back with results.     11:30: Pau returned call to triage /57.     Per protocol, patient should be seen today (scheduled with [facility]). Patient made aware visit is acute only, cannot see in the future.       Additional Information    Negative: Shock suspected (e.g., cold/pale/clammy skin, too weak to stand, low BP, rapid pulse)    Negative: Difficult to awaken or acting confused (e.g., disoriented, slurred speech)    Negative: Sounds like a life-threatening emergency to the triager    Negative: Vomiting also present and worse than the diarrhea    Negative: Blood in stool and without diarrhea    Negative: SEVERE abdominal pain (e.g., excruciating) and present > 1 hour    Negative: SEVERE abdominal pain and age > 60    Negative: Bloody, black, or tarry bowel movements    Negative: SEVERE diarrhea (e.g., 7 or more times / day more than normal) and age > 60 years    Negative: Constant abdominal pain lasting > 2 hours    Negative: Drinking very little and has signs of dehydration (e.g., no urine > 12 hours, very dry mouth, very lightheaded)    Negative: Patient sounds very sick or weak to the triager    MODERATE diarrhea (e.g., 4-6 times / day more than normal) and age > 70 years    Answer Assessment - Initial Assessment Questions  1. DIARRHEA SEVERITY: \"How bad is the diarrhea?\" \"How many extra stools have you had in the past 24 hours than normal?\"     - MILD: Few loose or mushy BMs; increase of 1-3 stools over normal daily number of stools; mild increase in ostomy output.    - MODERATE: Increase of 4-6 stools daily over normal; moderate increase in ostomy output.    - SEVERE (or Worst Possible): Increase of 7 or more " "stools daily over normal; moderate increase in ostomy output; incontinence.      moderate  2. ONSET: \"When did the diarrhea begin?\"       Last night  3. BM CONSISTENCY: \"How loose or watery is the diarrhea?\"       watery  4. VOMITING: \"Are you also vomiting?\" If so, ask: \"How many times in the past 24 hours?\"       no  5. ABDOMINAL PAIN: \"Are you having any abdominal pain?\" If yes: \"What does it feel like?\" (e.g., crampy, dull, intermittent, constant)       crampy  6. ABDOMINAL PAIN SEVERITY: If present, ask: \"How bad is the pain?\"  (e.g., Scale 1-10; mild, moderate, or severe)     - MILD (1-3): doesn't interfere with normal activities, abdomen soft and not tender to touch      - MODERATE (4-7): interferes with normal activities or awakens from sleep, tender to touch      - SEVERE (8-10): excruciating pain, doubled over, unable to do any normal activities        moderate  7. ORAL INTAKE: If vomiting, \"Have you been able to drink liquids?\" \"How much fluids have you had in the past 24 hours?\"      yest  8. HYDRATION: \"Any signs of dehydration?\" (e.g., dry mouth [not just dry lips], too weak to stand, dizziness, new weight loss) \"When did you last urinate?\"      no  9. EXPOSURE: \"Have you traveled to a foreign country recently?\" \"Have you been exposed to anyone with diarrhea?\" \"Could you have eaten any food that was spoiled?\"      no  10. ANTIBIOTIC USE: \"Are you taking antibiotics now or have you taken antibiotics in the past 2 months?\"        no  11. OTHER SYMPTOMS: \"Do you have any other symptoms?\" (e.g., fever, blood in stool)        Blood in stool - hx of hemmroids   12. PREGNANCY: \"Is there any chance you are pregnant?\" \"When was your last menstrual period?\"        no    Protocols used: DIARRHEA-A-OH      "

## 2020-01-21 NOTE — TELEPHONE ENCOUNTER
[facility] at 12:00PM for the following:    Constipation, loose stools; 1/20/20 had chills, fever of 101.1 degrees F., pulse ; /54 lying down, 81/57 sitting; abdominal pain/cramps, small amt bright red blood when defecating (note: has hemorrhoids).    Routing to review symptoms prior to appointment. This is not Dr Alston's patient but she will see if patient needs to be seen in clinic after triaging symptoms. Please notify patient of provider's closed practice so this appointment will be for acute visit only.

## 2020-01-21 NOTE — TELEPHONE ENCOUNTER
Patient was last seen 12/31/19 by Dr. Cordova, accompanied by daughter.   Diarrhea addressed that day.       BP Readings from Last 3 Encounters:   12/31/19 130/64   08/29/19 124/56   07/23/19 136/60     Attempted to call patient at home/mobile number, left message on voicemail; patient was instructed to return call to [facility] RN directly on the RN call back line at [phone]   Vonda Fay RN  [facility]

## 2020-01-21 NOTE — PATIENT INSTRUCTIONS
Stop all supplements and medication  (Mirtazapine okay if needed for sleep)    Metamucil or other fiber daily as directed.     Stool samples for testing.      Return to clinic 2 weeks.

## 2020-01-21 NOTE — PROGRESS NOTES
Subjective     Bambi Feldman is a 89 year old male who presents to clinic today for the following health issues:    HPI   Diarrhea  Onset: 1 day    Description:   Consistency of stool: watery and runny  Blood in stool: YES  Number of loose stools in past 24 hours: 7    Progression of Symptoms:  same    Accompanying Signs & Symptoms:  Fever: YES  Nausea or vomiting; no   Abdominal pain: YES  Episodes of constipation: YES  Weight loss: no     History:   Ill contacts: no   Recent use of antibiotics: no    Recent travels: no          Recent medication-new or changes(Rx or OTC): no    Precipitating factors: has had fevers and chills       Alleviating factors:       Therapies Tried and outcome:  none; Outcome:     This is on and off.  Around 19 he was seen for same symptoms. Then it improved... then he felt constipated so took a FLEETS and since then very severe diarrhea.    Not using any stool sofenerd       Patient Active Problem List   Diagnosis     Hypertension goal BP (blood pressure) < 140/90     Other malignant neoplasm of skin, site unspecified     HYPERLIPIDEMIA LDL GOAL <130     Advanced directives, counseling/discussion     Elevated serum creatinine     Primary osteoarthritis of both knees     Trochanteric bursitis of right hip     Chronic kidney disease, stage 3 (moderate) (H)     Ganglion cyst of wrist, left     Past Surgical History:   Procedure Laterality Date     SUPRAPUBIC PROSTATECTOMY       SURGICAL HISTORY OF -       skin cancer right neck and lip       Social History     Tobacco Use     Smoking status: Former Smoker     Last attempt to quit: 1970     Years since quittin.0     Smokeless tobacco: Never Used   Substance Use Topics     Alcohol use: Yes     Comment: 1 drink per night      Family History   Problem Relation Age of Onset     Alzheimer Disease Sister      C.A.D. No family hx of      Diabetes No family hx of      Hypertension No family hx of      Cerebrovascular Disease No family  hx of      Breast Cancer No family hx of      Cancer - colorectal No family hx of      Prostate Cancer No family hx of          Current Outpatient Medications   Medication Sig Dispense Refill     cholecalciferol (VITAMIN D3) 56025 UNIT capsule Take 1 capsule by mouth daily.       co-enzyme Q-10 (COQ10) 100 MG CAPS Take  by mouth daily.       cyanocolbalamin (VITAMIN  B-12) 500 MCG tablet Take 500 mcg by mouth daily       Flaxseed, Linseed, (FLAXSEED OIL) 1200 MG CAPS        Garlic 2000 MG CAPS Take 500 mg by mouth        Ginseng 250 MG CAPS        glucosamine 500 MG CAPS capsule Take 500 mg by mouth daily       lisinopril (PRINIVIL/ZESTRIL) 10 MG tablet Take 1 tablet (10 mg) by mouth daily 90 tablet 1     Magnesium 100 MG CAPS Take  by mouth.       mirtazapine (REMERON) 15 MG tablet One tab at bedtime as needed 30 tablet 8     naproxen sodium (ALEVE) 220 MG tablet Take 220 mg by mouth nightly as needed for moderate pain       Omega-3 Fatty Acids (FISH OIL) 1200 MG capsule Take 1,200 mg by mouth daily       polyethylene glycol (MIRALAX) powder Take 17 g (1 capful) by mouth daily 510 g 3     Probiotic Product (ADVANCED PROBIOTIC PO)        Psyllium (METAMUCIL FIBER PO)        UNABLE TO FIND daily MEDICATION NAME: Super Vitamin B       Allergies   Allergen Reactions     Nkda [No Known Drug Allergies]      Recent Labs   Lab Test 10/17/19  1018 04/19/19  1058 03/01/18  0807 08/03/17  0916 07/31/17 03/10/16  1343  01/19/15  1301   *  --  106*  --   --  128*   < >  --    HDL 35*  --  58  --   --   --   --   --    TRIG 180*  --  108  --   --   --   --   --    ALT  --   --  18 40  --   --   --   --    CR 1.24 1.36* 1.11 1.11 1.57* 1.28*   < >  --    GFRESTIMATED 50* 45* 62 62 44 53*   < >  --    GFRESTBLACK 58* 52* 75 75  --  64   < >  --    POTASSIUM 4.8 4.3 4.7 4.7  --  4.4   < >  --    TSH  --   --   --   --  3.460  --   --  2.68    < > = values in this interval not displayed.      BP Readings from Last 3  "Encounters:   01/21/20 118/61   12/31/19 130/64   08/29/19 124/56    Wt Readings from Last 3 Encounters:   01/21/20 76.7 kg (169 lb)   12/31/19 78.5 kg (173 lb)   08/29/19 80.1 kg (176 lb 9.6 oz)                    Reviewed and updated as needed this visit by Provider         Review of Systems   ROS COMP: Constitutional, HEENT, cardiovascular, pulmonary, gi and gu systems are negative, except as otherwise noted.      Objective    /61 (BP Location: Right arm, Patient Position: Sitting, Cuff Size: Adult Regular)   Pulse 78   Temp 97.4  F (36.3  C) (Oral)   Ht 1.702 m (5' 7\")   Wt 76.7 kg (169 lb)   SpO2 97%   BMI 26.47 kg/m    Body mass index is 26.47 kg/m .  Physical Exam   GENERAL: alert, no distress, frail and elderly  NECK: no adenopathy, no asymmetry, masses, or scars and thyroid normal to palpation  RESP: lungs clear to auscultation - no rales, rhonchi or wheezes  CV: regular rate and rhythm, normal S1 S2, no S3 or S4, no murmur, click or rub, no peripheral edema and peripheral pulses strong  ABDOMEN: soft, nontender, no hepatosplenomegaly, no masses and bowel sounds normal  MS: slow stiff shuffling gait due to dejenerative joint arthritis     Diagnostic Test Results:  Labs reviewed in Epic  No results found for any visits on 01/21/20.        Assessment & Plan       ICD-10-CM    1. Diarrhea of presumed infectious origin R19.7 Enteric Bacteria and Virus Panel by [facility]DAL[facility] Stool     Ova and Parasite Exam Routine     Clostridium difficile Toxin B PCR   2. Hypertension goal BP (blood pressure) < 140/90 I10    3. Chronic kidney disease, stage 3 (moderate) (H) N18.3         Patient Instructions   Stop all supplements and medication  (Mirtazapine okay if needed for sleep)    Metamucil or other fiber daily as directed.     Stool samples for testing.  Goes to [place] to see wife often.  Could be exposed to pathogens... though symptoms seem to have been waxing/waning for months...     Return to clinic 2 weeks. -- " reassess lipitor... consider empiric Rx if persists and all stool samples are negative....        Return in about 2 weeks (around 2/4/2020).    Jessa Alston MD  [facility]

## 2020-01-22 DIAGNOSIS — R19.7 DIARRHEA OF PRESUMED INFECTIOUS ORIGIN: ICD-10-CM

## 2020-01-22 LAB
C DIFF TOX B STL QL: NEGATIVE
SPECIMEN SOURCE: NORMAL

## 2020-01-22 NOTE — LETTER
[facility]  [address] [place]  [address]  [phone]        January 23, 2020    Bambi Feldman  [address] [facility] [address]        Dear Bambi,    So far, stool testing is negative for infection     Results for orders placed or performed in visit on 01/22/20   Clostridium difficile Toxin B PCR     Status: None   Result Value Ref Range    Specimen Description Feces     C Diff Toxin B PCR Negative NEG^Negative   Enteric Bacteria and Virus Panel by [facility]AL[facility] Stool     Status: None   Result Value Ref Range    Campylobacter group by MAGDALENO Not Detected NDET^Not Detected    Salmonella species by MAGDALENO Not Detected NDET^Not Detected    Shigella species by MAGDALENO Not Detected NDET^Not Detected    Vibrio group by MAGDALENO Not Detected NDET^Not Detected    Rotavirus A by MAGDALENO Not Detected NDET^Not Detected    Shiga toxin 1 gene by MAGDALENO Not Detected NDET^Not Detected    Shiga toxin 2 gene by MAGDALENO Not Detected NDET^Not Detected    Norovirus I and II by MAGDALENO Not Detected NDET^Not Detected    Yersinia enterocolitica by MAGDALENO Not Detected NDET^Not Detected    Enteric pathogen comment       Testing performed by multiplexed, qualitative PCR using the [employer] Enteric   Pathogens Nucleic Acid Test. Results should not be used as the sole basis for diagnosis,   treatment, or other patient management decisions.         If you have any questions please call the clinic at [phone].    Sincerely,    Jessa WAITE

## 2020-01-23 LAB
C COLI+JEJUNI+LARI FUSA STL QL NAA+PROBE: NOT DETECTED
EC STX1 GENE STL QL NAA+PROBE: NOT DETECTED
EC STX2 GENE STL QL NAA+PROBE: NOT DETECTED
ENTERIC PATHOGEN COMMENT: NORMAL
NOROV GI+II ORF1-ORF2 JNC STL QL NAA+PR: NOT DETECTED
O+P STL MICRO: NORMAL
RVA NSP5 STL QL NAA+PROBE: NOT DETECTED
SALMONELLA SP RPOD STL QL NAA+PROBE: NOT DETECTED
SHIGELLA SP+EIEC IPAH STL QL NAA+PROBE: NOT DETECTED
SPECIMEN SOURCE: NORMAL
V CHOL+PARA RFBL+TRKH+TNAA STL QL NAA+PR: NOT DETECTED
Y ENTERO RECN STL QL NAA+PROBE: NOT DETECTED

## 2020-01-23 NOTE — RESULT ENCOUNTER NOTE
Bambi Feldman    So far, stool testing is negative for infection    Sincerely,     JUDE LANDAVERDE M.D.   Send note

## 2020-02-04 ENCOUNTER — OFFICE VISIT (OUTPATIENT)
Dept: FAMILY MEDICINE | Facility: CLINIC | Age: 85
End: 2020-02-04
Payer: COMMERCIAL

## 2020-02-04 VITALS
DIASTOLIC BLOOD PRESSURE: 56 MMHG | WEIGHT: 170 LBS | HEART RATE: 80 BPM | OXYGEN SATURATION: 96 % | TEMPERATURE: 96.5 F | BODY MASS INDEX: 26.63 KG/M2 | SYSTOLIC BLOOD PRESSURE: 93 MMHG

## 2020-02-04 DIAGNOSIS — M17.12 PRIMARY OSTEOARTHRITIS OF LEFT KNEE: ICD-10-CM

## 2020-02-04 DIAGNOSIS — R19.5 LOOSE STOOLS: Primary | ICD-10-CM

## 2020-02-04 DIAGNOSIS — M17.11 PRIMARY OSTEOARTHRITIS OF RIGHT KNEE: ICD-10-CM

## 2020-02-04 DIAGNOSIS — N18.30 CHRONIC KIDNEY DISEASE, STAGE 3 (MODERATE): ICD-10-CM

## 2020-02-04 DIAGNOSIS — I10 HYPERTENSION GOAL BP (BLOOD PRESSURE) < 140/90: ICD-10-CM

## 2020-02-04 PROCEDURE — 20610 DRAIN/INJ JOINT/BURSA W/O US: CPT | Performed by: INTERNAL MEDICINE

## 2020-02-04 PROCEDURE — 99214 OFFICE O/P EST MOD 30 MIN: CPT | Mod: 25 | Performed by: INTERNAL MEDICINE

## 2020-02-04 RX ORDER — TRIAMCINOLONE ACETONIDE 40 MG/ML
40 INJECTION, SUSPENSION INTRA-ARTICULAR; INTRAMUSCULAR ONCE
Status: COMPLETED | OUTPATIENT
Start: 2020-02-04 | End: 2020-02-04

## 2020-02-04 RX ORDER — VIT C/B6/B5/MAGNESIUM/HERB 173 50-5-6-5MG
CAPSULE ORAL DAILY
COMMUNITY

## 2020-02-04 RX ADMIN — TRIAMCINOLONE ACETONIDE 40 MG: 40 INJECTION, SUSPENSION INTRA-ARTICULAR; INTRAMUSCULAR at 13:17

## 2020-02-04 NOTE — PROGRESS NOTES
Subjective     Bambi Feldman is a 89 year old male who presents to clinic today for the following health issues:    89 y/o M here for f/u loose stools.  H/o  HTN, OA, gait difficulty, CKD 3.  His stool tests are negative.  He has held all of his supplements  and held   Started daily metamucil. Stools are a lot better, and he attributes this to daily metamucil.   Still taking supplements.          HPI   Follow up on diarrhea-Better now      Musculoskeletal problem/pain      Duration: 2 months    Description  Location: bilateral knees    Intensity:  Severe at night     Accompanying signs and symptoms: none    History  Previous similar problem: YES  Previous evaluation:  x-ray    Precipitating or alleviating factors:  Trauma or overuse: no   Aggravating factors include: worse when sleeping     Therapies tried and outcome: stretching    Had injections to knees in past with minimal  Relief.    Tramadol helps a little, but upsets stomach         Patient Active Problem List   Diagnosis     Hypertension goal BP (blood pressure) < 140/90     Other malignant neoplasm of skin, site unspecified     HYPERLIPIDEMIA LDL GOAL <130     Advanced directives, counseling/discussion     Elevated serum creatinine     Primary osteoarthritis of both knees     Trochanteric bursitis of right hip     Chronic kidney disease, stage 3 (moderate) (H)     Ganglion cyst of wrist, left     Past Surgical History:   Procedure Laterality Date     SUPRAPUBIC PROSTATECTOMY       SURGICAL HISTORY OF -       skin cancer right neck and lip       Social History     Tobacco Use     Smoking status: Former Smoker     Last attempt to quit: 1970     Years since quittin.0     Smokeless tobacco: Never Used   Substance Use Topics     Alcohol use: Yes     Comment: 1 drink per night      Family History   Problem Relation Age of Onset     Alzheimer Disease Sister      C.A.D. No family hx of      Diabetes No family hx of      Hypertension No family hx of       Cerebrovascular Disease No family hx of      Breast Cancer No family hx of      Cancer - colorectal No family hx of      Prostate Cancer No family hx of          Current Outpatient Medications   Medication Sig Dispense Refill     cholecalciferol (VITAMIN D3) 78007 UNIT capsule Take 1 capsule by mouth daily.       co-enzyme Q-10 (COQ10) 100 MG CAPS Take by mouth daily 200mg       cyanocolbalamin (VITAMIN  B-12) 500 MCG tablet Take 500 mcg by mouth daily 2500 mg       Flaxseed, Linseed, (FLAXSEED OIL) 1200 MG CAPS        Garlic 2000 MG CAPS Take 500 mg by mouth 500mg       Ginseng 250 MG CAPS        glucosamine 500 MG CAPS capsule Take 500 mg by mouth daily       lisinopril (PRINIVIL/ZESTRIL) 10 MG tablet Take 1 tablet (10 mg) by mouth daily 90 tablet 1     mirtazapine (REMERON) 15 MG tablet One tab at bedtime as needed 30 tablet 8     Multiple Vitamins-Minerals (ENERGY BOOSTER PO)        naproxen sodium (ALEVE) 220 MG tablet Take 220 mg by mouth nightly as needed for moderate pain       NEW MED Beat root  1000mg       Omega-3 Fatty Acids (FISH OIL) 1200 MG capsule Take 1,200 mg by mouth daily       Probiotic Product (ADVANCED PROBIOTIC PO)        Psyllium (METAMUCIL FIBER PO)        Turmeric 500 MG CAPS Take by mouth daily       UNABLE TO FIND daily MEDICATION NAME: Super Vitamin B       Allergies   Allergen Reactions     Nkda [No Known Drug Allergies]      Recent Labs   Lab Test 10/17/19  1018 04/19/19  1058 03/01/18  0807 08/03/17  0916 07/31/17 03/10/16  1343  01/19/15  1301   *  --  106*  --   --  128*   < >  --    HDL 35*  --  58  --   --   --   --   --    TRIG 180*  --  108  --   --   --   --   --    ALT  --   --  18 40  --   --   --   --    CR 1.24 1.36* 1.11 1.11 1.57* 1.28*   < >  --    GFRESTIMATED 50* 45* 62 62 44 53*   < >  --    GFRESTBLACK 58* 52* 75 75  --  64   < >  --    POTASSIUM 4.8 4.3 4.7 4.7  --  4.4   < >  --    TSH  --   --   --   --  3.460  --   --  2.68    < > = values in this  interval not displayed.      BP Readings from Last 3 Encounters:   02/04/20 93/56   01/21/20 118/61   12/31/19 130/64    Wt Readings from Last 3 Encounters:   02/04/20 77.1 kg (170 lb)   01/21/20 76.7 kg (169 lb)   12/31/19 78.5 kg (173 lb)                    Reviewed and updated as needed this visit by Provider         Review of Systems   ROS COMP: Constitutional, HEENT, cardiovascular, pulmonary, gi and gu systems are negative, except as otherwise noted.      Objective    BP 93/56 (BP Location: Left arm, Patient Position: Sitting, Cuff Size: Adult Regular)   Pulse 80   Temp 96.5  F (35.8  C) (Oral)   Wt 77.1 kg (170 lb)   SpO2 96%   BMI 26.63 kg/m    Body mass index is 26.63 kg/m .     Physical Exam     GENERAL: alert, no distress, elderly and fatigued  ABDOMEN: soft, nontender, no hepatosplenomegaly, no masses and bowel sounds normal  MS: no gross musculoskeletal defects noted, no edema  MS: mild crepitus both knees during range of motion.  Mild effusion right knee.  No redness or gouty appearances.   SKIN: no suspicious lesions or rashes    Diagnostic Test Results:  Labs reviewed in Epic    This procedure has been fully reviewed with the patient and written informed consent has been obtained.  After cleaning area with betadine, a steroid injection was performed at  Medial aspect of BOTH knees using 1% plain Lidocaine (1.  cc) and  40 mg of K40 (1  cc). This was well tolerated.          Assessment & Plan       ICD-10-CM    1. Loose stools R19.5    2. Hypertension goal BP (blood pressure) < 140/90 I10    3. Chronic kidney disease, stage 3 (moderate) (H) N18.3    4. Primary osteoarthritis of right knee M17.11 triamcinolone (KENALOG-40) injection 40 mg     DRAIN/INJECT LARGE JOINT/BURSA   5. Primary osteoarthritis of left knee M17.12 triamcinolone (KENALOG-40) injection 40 mg     DRAIN/INJECT LARGE JOINT/BURSA            Return in about 6 months (around 8/4/2020) for Physical Exam.    Jessa Alston,  MD  [facility]

## 2020-02-16 ENCOUNTER — HEALTH MAINTENANCE LETTER (OUTPATIENT)
Age: 85
End: 2020-02-16

## 2020-07-20 ENCOUNTER — VIRTUAL VISIT (OUTPATIENT)
Dept: FAMILY MEDICINE | Facility: CLINIC | Age: 85
End: 2020-07-20
Payer: COMMERCIAL

## 2020-07-20 DIAGNOSIS — M17.0 PRIMARY OSTEOARTHRITIS OF BOTH KNEES: Primary | ICD-10-CM

## 2020-07-20 PROCEDURE — 99213 OFFICE O/P EST LOW 20 MIN: CPT | Mod: 95 | Performed by: FAMILY MEDICINE

## 2020-07-20 RX ORDER — SENNOSIDES 8.6 MG
1300 CAPSULE ORAL EVERY 8 HOURS PRN
Qty: 360 TABLET | Refills: 3 | Status: SHIPPED | OUTPATIENT
Start: 2020-07-20 | End: 2021-09-07

## 2020-07-20 RX ORDER — PREDNISONE 20 MG/1
20 TABLET ORAL DAILY
Qty: 5 TABLET | Refills: 0 | Status: SHIPPED | OUTPATIENT
Start: 2020-07-20 | End: 2020-09-15

## 2020-07-20 NOTE — PROGRESS NOTES
"Bambi Feldman is a 89 year old male who is being evaluated via a billable telephone visit.      The patient has been notified of following:     \"This telephone visit will be conducted via a call between you and your physician/provider. We have found that certain health care needs can be provided without the need for a physical exam.  This service lets us provide the care you need with a short phone conversation.  If a prescription is necessary we can send it directly to your pharmacy.  If lab work is needed we can place an order for that and you can then stop by our lab to have the test done at a later time.    Telephone visits are billed at different rates depending on your insurance coverage. During this emergency period, for some insurers they may be billed the same as an in-person visit.  Please reach out to your insurance provider with any questions.    If during the course of the call the physician/provider feels a telephone visit is not appropriate, you will not be charged for this service.\"    Patient has given verbal consent for Telephone visit?  Yes    What phone number would you like to be contacted at? [phone]    How would you like to obtain your AVS? Mail a copy    Subjective     Bambi Feldman is a 89 year old male who presents via phone visit today for the following health issues:    HPI    Joint Pain    Onset: over a couple of years    Description:   Location: left knee, right knee, left hip and right hip  Character: Sharp, Dull ache and Stabbing    Intensity: severe    Progression of Symptoms: worse, intermittent    Accompanying Signs & Symptoms:  Other symptoms: tingling    History:   Previous similar pain: YES      Precipitating factors:   Trauma or overuse: no     Alleviating factors:  Improved by: rest/inactivity    Therapies Tried and outcome: Aleve and Advil, works for only an hour.      Patient Active Problem List   Diagnosis     Hypertension goal BP (blood pressure) < 140/90     Other " malignant neoplasm of skin, site unspecified     HYPERLIPIDEMIA LDL GOAL <130     Advanced directives, counseling/discussion     Elevated serum creatinine     Primary osteoarthritis of both knees     Trochanteric bursitis of right hip     Chronic kidney disease, stage 3 (moderate) (H)     Ganglion cyst of wrist, left     Past Surgical History:   Procedure Laterality Date     SUPRAPUBIC PROSTATECTOMY       SURGICAL HISTORY OF -       skin cancer right neck and lip       Social History     Tobacco Use     Smoking status: Former Smoker     Last attempt to quit: 1970     Years since quittin.5     Smokeless tobacco: Never Used   Substance Use Topics     Alcohol use: Yes     Comment: 1 drink per night      Family History   Problem Relation Age of Onset     Alzheimer Disease Sister      C.A.D. No family hx of      Diabetes No family hx of      Hypertension No family hx of      Cerebrovascular Disease No family hx of      Breast Cancer No family hx of      Cancer - colorectal No family hx of      Prostate Cancer No family hx of          Current Outpatient Medications   Medication Sig Dispense Refill     acetaminophen (TYLENOL) 650 MG CR tablet Take 2 tablets (1,300 mg) by mouth every 8 hours as needed for mild pain or fever 360 tablet 3     cholecalciferol (VITAMIN D3) 20533 UNIT capsule Take 1 capsule by mouth daily.       co-enzyme Q-10 (COQ10) 100 MG CAPS Take by mouth daily 200mg       cyanocolbalamin (VITAMIN  B-12) 500 MCG tablet Take 500 mcg by mouth daily 2500 mg       diclofenac (VOLTAREN) 1 % topical gel Apply 2 gram to knees qid as needed 100 g 3     Flaxseed, Linseed, (FLAXSEED OIL) 1200 MG CAPS        Garlic 2000 MG CAPS Take 500 mg by mouth 500mg       Ginseng 250 MG CAPS        glucosamine 500 MG CAPS capsule Take 500 mg by mouth daily       lisinopril (PRINIVIL/ZESTRIL) 10 MG tablet Take 1 tablet (10 mg) by mouth daily 90 tablet 1     mirtazapine (REMERON) 15 MG tablet One tab at bedtime as needed  30 tablet 8     Multiple Vitamins-Minerals (ENERGY BOOSTER PO)        NEW MED Beat root  1000mg       Omega-3 Fatty Acids (FISH OIL) 1200 MG capsule Take 1,200 mg by mouth daily       predniSONE (DELTASONE) 20 MG tablet Take 1 tablet (20 mg) by mouth daily 5 tablet 0     Probiotic Product (ADVANCED PROBIOTIC PO)        Psyllium (METAMUCIL FIBER PO)        Turmeric 500 MG CAPS Take by mouth daily       UNABLE TO FIND daily MEDICATION NAME: Super Vitamin B       Allergies   Allergen Reactions     Nkda [No Known Drug Allergies]      Simvastatin Muscle Pain (Myalgia)       Reviewed and updated as needed this visit by Provider         Review of Systems   Constitutional, HEENT, cardiovascular, pulmonary, gi and gu systems are negative, except as otherwise noted.       Objective   Reported vitals:  There were no vitals taken for this visit.   healthy, alert and no distress  PSYCH: Alert and oriented times 3; coherent speech, normal   rate and volume, able to articulate logical thoughts, able   to abstract reason, no tangential thoughts, no hallucinations   or delusions  His affect is normal and pleasant  RESP: No cough, no audible wheezing, able to talk in full sentences  Remainder of exam unable to be completed due to telephone visits    Diagnostic Test Results:  Labs reviewed in Epic  none         Assessment/Plan:    1. Primary osteoarthritis of both knees  Dis  cussed with patient and his wife.  This does not work he may consider cortisone injection.  - predniSONE (DELTASONE) 20 MG tablet; Take 1 tablet (20 mg) by mouth daily  Dispense: 5 tablet; Refill: 0  - diclofenac (VOLTAREN) 1 % topical gel; Apply 2 gram to knees qid as needed  Dispense: 100 g; Refill: 3  - acetaminophen (TYLENOL) 650 MG CR tablet; Take 2 tablets (1,300 mg) by mouth every 8 hours as needed for mild pain or fever  Dispense: 360 tablet; Refill: 3    No follow-ups on file.      Phone call duration:  8 minutes    Purnima Nassar MD

## 2020-07-23 DIAGNOSIS — I10 HYPERTENSION GOAL BP (BLOOD PRESSURE) < 140/90: ICD-10-CM

## 2020-07-27 RX ORDER — LISINOPRIL 10 MG/1
TABLET ORAL
Qty: 90 TABLET | Refills: 2 | Status: SHIPPED | OUTPATIENT
Start: 2020-07-27 | End: 2020-09-15

## 2020-09-11 ENCOUNTER — TELEPHONE (OUTPATIENT)
Dept: FAMILY MEDICINE | Facility: CLINIC | Age: 85
End: 2020-09-11

## 2020-09-11 NOTE — TELEPHONE ENCOUNTER
Reason for call:  Patient reporting a symptom    Symptom or request:  Rash on nose    Duration (how long have symptoms been present): Several days    Have you been treated for this before? Yes    Additional comments: Patient is calling to request a rx for an ointment for a rash on his nose.  Patient states that he has used a OTC ointment and it isn't working.  He is trying for something that does nor scab over  Please send to      [address]       Phone Number patient can be reached at: \ [phone]    Best Time:  Any    Can we leave a detailed message on this number:  YES    Call taken on 9/11/2020 at 1:23 PM by Kavita Billy

## 2020-09-14 NOTE — TELEPHONE ENCOUNTER
Called and spoke to patient, rash has been present for about a week.  Patient advised that it would be best to be seen in person to determine the cause of rash, especially if OTC cream is not helping.  He asked that I speak with daughter regarding scheduling. Marissa (daughter) was agreeable to bring Bambi to appointment tomorrow at 12pm.    Victoria Saucedo RN

## 2020-09-15 ENCOUNTER — OFFICE VISIT (OUTPATIENT)
Dept: FAMILY MEDICINE | Facility: CLINIC | Age: 85
End: 2020-09-15
Payer: COMMERCIAL

## 2020-09-15 VITALS
WEIGHT: 173 LBS | BODY MASS INDEX: 27.15 KG/M2 | DIASTOLIC BLOOD PRESSURE: 73 MMHG | HEART RATE: 75 BPM | HEIGHT: 67 IN | SYSTOLIC BLOOD PRESSURE: 137 MMHG | TEMPERATURE: 97.4 F

## 2020-09-15 DIAGNOSIS — I10 HYPERTENSION GOAL BP (BLOOD PRESSURE) < 140/90: Primary | ICD-10-CM

## 2020-09-15 DIAGNOSIS — L24.89 IRRITANT CONTACT DERMATITIS DUE TO OTHER AGENTS: ICD-10-CM

## 2020-09-15 DIAGNOSIS — M17.0 PRIMARY OSTEOARTHRITIS OF BOTH KNEES: ICD-10-CM

## 2020-09-15 DIAGNOSIS — G47.09 OTHER INSOMNIA: ICD-10-CM

## 2020-09-15 DIAGNOSIS — M70.61 TROCHANTERIC BURSITIS OF RIGHT HIP: ICD-10-CM

## 2020-09-15 PROCEDURE — 80048 BASIC METABOLIC PNL TOTAL CA: CPT | Performed by: FAMILY MEDICINE

## 2020-09-15 PROCEDURE — 90662 IIV NO PRSV INCREASED AG IM: CPT | Performed by: FAMILY MEDICINE

## 2020-09-15 PROCEDURE — G0008 ADMIN INFLUENZA VIRUS VAC: HCPCS | Performed by: FAMILY MEDICINE

## 2020-09-15 PROCEDURE — 99214 OFFICE O/P EST MOD 30 MIN: CPT | Mod: 25 | Performed by: FAMILY MEDICINE

## 2020-09-15 PROCEDURE — 36415 COLL VENOUS BLD VENIPUNCTURE: CPT | Performed by: FAMILY MEDICINE

## 2020-09-15 RX ORDER — LISINOPRIL 10 MG/1
10 TABLET ORAL DAILY
Qty: 90 TABLET | Refills: 1 | Status: SHIPPED | OUTPATIENT
Start: 2020-09-15 | End: 2021-04-16

## 2020-09-15 RX ORDER — TRAMADOL HYDROCHLORIDE 50 MG/1
50 TABLET ORAL EVERY 6 HOURS PRN
Qty: 10 TABLET | Refills: 0 | Status: SHIPPED | OUTPATIENT
Start: 2020-09-15 | End: 2020-09-18

## 2020-09-15 RX ORDER — TRIAMCINOLONE ACETONIDE 1 MG/G
OINTMENT TOPICAL 2 TIMES DAILY
Qty: 30 G | Refills: 1 | Status: SHIPPED | OUTPATIENT
Start: 2020-09-15 | End: 2021-09-17

## 2020-09-15 RX ORDER — MIRTAZAPINE 15 MG/1
TABLET, FILM COATED ORAL
Qty: 90 TABLET | Refills: 3 | Status: SHIPPED | OUTPATIENT
Start: 2020-09-15 | End: 2021-09-17

## 2020-09-15 ASSESSMENT — MIFFLIN-ST. JEOR: SCORE: 1393.35

## 2020-09-15 NOTE — PROGRESS NOTES
"Subjective     Bambi Feldman is a 89 year old male who presents to clinic today for the following health issues:    HPI       Rash  Onset/Duration:  5-6 months   Description  Location: Nose and both ears  Character: bumpy  Itching: mild  Intensity:  moderate  Progression of Symptoms:  same  Accompanying signs and symptoms:   Fever: no  Body aches or joint pain: no  Sore throat symptoms: no  Recent cold symptoms: no  History:           Previous episodes of similar rash: None  New exposures:  None  Recent travel: no  Exposure to similar rash: no  Precipitating or alleviating factors:   Therapies tried and outcome: OTC anti-itch cream helps a little before bed for 3 months or so    Hypertension Follow-up      Do you check your blood pressure regularly outside of the clinic? No     Are you following a low salt diet? Yes    Are your blood pressures ever more than 140 on the top number (systolic) OR more   than 90 on the bottom number (diastolic), for example 140/90? No   Lisinopril 10 mg qd    He is on remeron for sleep which is helping a lot.  He is taking 1/2 tab.      He has pain in his hips and knees when he tries to sleep.  He was given prednisone and it didn't help much.  He tried 8 hour tyelnol.      Review of Systems   Constitutional, HEENT, cardiovascular, pulmonary, gi and gu systems are negative, except as otherwise noted.      Objective    /73 (BP Location: Right arm, Patient Position: Chair, Cuff Size: Adult Regular)   Pulse 75   Temp 97.4  F (36.3  C) (Oral)   Ht 1.702 m (5' 7\")   Wt 78.5 kg (173 lb)   BMI 27.10 kg/m    Body mass index is 27.1 kg/m .  Physical Exam   GENERAL: alert, no distress and elderly  NECK: no adenopathy, no asymmetry, masses, or scars and thyroid normal to palpation  RESP: lungs clear to auscultation - no rales, rhonchi or wheezes  CV: regular rate and rhythm, normal S1 S2, no S3 or S4, no murmur, click or rub, no peripheral edema and peripheral pulses strong  MS: arthritis " "of the knees bilaterally  SKIN: Erythema and scaling on the right side of his nose bridge also behind his right ear  PSYCH: mentation appears normal, affect normal/bright    No results found for this or any previous visit (from the past 24 hour(s)).        Assessment & Plan     Bambi was seen today for derm problem and health maintenance.    Diagnoses and all orders for this visit:    Hypertension goal BP (blood pressure) < 140/90  -     lisinopril (ZESTRIL) 10 MG tablet; Take 1 tablet (10 mg) by mouth daily  -     Basic metabolic panel  (Ca, Cl, CO2, Creat, Gluc, K, Na, BUN)    Other insomnia  -     mirtazapine (REMERON) 15 MG tablet; One tab at bedtime as needed    Irritant contact dermatitis due to other agents  -     triamcinolone (KENALOG) 0.1 % external ointment; Apply topically 2 times daily    Primary osteoarthritis of both knees  -     traMADol (ULTRAM) 50 MG tablet; Take 1 tablet (50 mg) by mouth every 6 hours as needed for severe pain    Trochanteric bursitis of right hip  -     traMADol (ULTRAM) 50 MG tablet; Take 1 tablet (50 mg) by mouth every 6 hours as needed for severe pain    Other orders  -     INFLUENZA (HIGH DOSE) 3 VALENT VACCINE [17849]         This patient is only sleeping 2 hours at a time due to his pain from arthritis.  Due to his age he is not a surgical candidate to get both knees replaced.  He will need to not take the Remeron the nights he takes tramadol due to risk of serotonin syndrome    Patient Instructions   Do not take the remeron while you are taking the tramadol.    Send me a [employer] message in a few days to let me know how it is going with the tramadol    Chandirka Cordova D.OAwilda         BMI:   Estimated body mass index is 27.1 kg/m  as calculated from the following:    Height as of this encounter: 1.702 m (5' 7\").    Weight as of this encounter: 78.5 kg (173 lb).       Regular exercise    No follow-ups on file.    Chandrika Cordova,   [facility]    "

## 2020-09-16 LAB
ANION GAP SERPL CALCULATED.3IONS-SCNC: 2 MMOL/L (ref 3–14)
BUN SERPL-MCNC: 17 MG/DL (ref 7–30)
CALCIUM SERPL-MCNC: 8.8 MG/DL (ref 8.5–10.1)
CHLORIDE SERPL-SCNC: 102 MMOL/L (ref 94–109)
CO2 SERPL-SCNC: 27 MMOL/L (ref 20–32)
CREAT SERPL-MCNC: 1.33 MG/DL (ref 0.66–1.25)
GFR SERPL CREATININE-BSD FRML MDRD: 46 ML/MIN/{1.73_M2}
GLUCOSE SERPL-MCNC: 114 MG/DL (ref 70–99)
POTASSIUM SERPL-SCNC: 4.9 MMOL/L (ref 3.4–5.3)
SODIUM SERPL-SCNC: 131 MMOL/L (ref 133–144)

## 2020-09-17 ENCOUNTER — TELEPHONE (OUTPATIENT)
Dept: FAMILY MEDICINE | Facility: CLINIC | Age: 85
End: 2020-09-17

## 2020-09-17 DIAGNOSIS — E87.1 HYPONATREMIA: Primary | ICD-10-CM

## 2020-09-17 NOTE — TELEPHONE ENCOUNTER
Reji Lacy,    Thank you for getting your labs done.  Your labs showed your sodium level is a little low and your kidney function is a little diminished.  It is okay to have salt to taste on your food.  Do not go overboard but do increase your salt and we will recheck you in a month.   Feel free to email or call if you have any questions.    Take care,    Chandrika Cordova D.O.

## 2021-03-16 ENCOUNTER — IMMUNIZATION (OUTPATIENT)
Dept: NURSING | Facility: CLINIC | Age: 86
End: 2021-03-16
Payer: COMMERCIAL

## 2021-03-16 PROCEDURE — 91300 PR COVID VAC PFIZER DIL RECON 30 MCG/0.3 ML IM: CPT

## 2021-03-16 PROCEDURE — 0001A PR COVID VAC PFIZER DIL RECON 30 MCG/0.3 ML IM: CPT

## 2021-04-04 ENCOUNTER — HEALTH MAINTENANCE LETTER (OUTPATIENT)
Age: 86
End: 2021-04-04

## 2021-04-06 ENCOUNTER — IMMUNIZATION (OUTPATIENT)
Dept: NURSING | Facility: CLINIC | Age: 86
End: 2021-04-06
Attending: FAMILY MEDICINE
Payer: COMMERCIAL

## 2021-04-06 PROCEDURE — 91300 PR COVID VAC PFIZER DIL RECON 30 MCG/0.3 ML IM: CPT

## 2021-04-06 PROCEDURE — 0002A PR COVID VAC PFIZER DIL RECON 30 MCG/0.3 ML IM: CPT

## 2021-04-15 DIAGNOSIS — I10 HYPERTENSION GOAL BP (BLOOD PRESSURE) < 140/90: ICD-10-CM

## 2021-04-16 RX ORDER — LISINOPRIL 10 MG/1
TABLET ORAL
Qty: 90 TABLET | Refills: 0 | Status: SHIPPED | OUTPATIENT
Start: 2021-04-16 | End: 2021-07-30

## 2021-04-16 NOTE — TELEPHONE ENCOUNTER
Routing refill request to provider for review/approval because:  Labs not current:    Creatinine   Date Value Ref Range Status   09/15/2020 1.33 (H) 0.66 - 1.25 mg/dL Final     Marilu Boyce RN

## 2021-07-29 DIAGNOSIS — I10 HYPERTENSION GOAL BP (BLOOD PRESSURE) < 140/90: ICD-10-CM

## 2021-07-30 RX ORDER — LISINOPRIL 10 MG/1
10 TABLET ORAL DAILY
Qty: 90 TABLET | Refills: 0 | Status: SHIPPED | OUTPATIENT
Start: 2021-07-30 | End: 2021-09-17

## 2021-08-27 ENCOUNTER — OFFICE VISIT (OUTPATIENT)
Dept: FAMILY MEDICINE | Facility: CLINIC | Age: 86
End: 2021-08-27
Payer: COMMERCIAL

## 2021-08-27 VITALS
HEART RATE: 76 BPM | BODY MASS INDEX: 27.41 KG/M2 | OXYGEN SATURATION: 97 % | TEMPERATURE: 97.4 F | RESPIRATION RATE: 18 BRPM | SYSTOLIC BLOOD PRESSURE: 120 MMHG | WEIGHT: 175 LBS | DIASTOLIC BLOOD PRESSURE: 60 MMHG

## 2021-08-27 DIAGNOSIS — N18.31 STAGE 3A CHRONIC KIDNEY DISEASE (H): ICD-10-CM

## 2021-08-27 DIAGNOSIS — M17.11 PRIMARY OSTEOARTHRITIS OF RIGHT KNEE: ICD-10-CM

## 2021-08-27 DIAGNOSIS — M17.12 PRIMARY OSTEOARTHRITIS OF LEFT KNEE: Primary | ICD-10-CM

## 2021-08-27 PROCEDURE — 20610 DRAIN/INJ JOINT/BURSA W/O US: CPT | Mod: 50 | Performed by: FAMILY MEDICINE

## 2021-08-27 PROCEDURE — 99213 OFFICE O/P EST LOW 20 MIN: CPT | Mod: 25 | Performed by: FAMILY MEDICINE

## 2021-08-27 RX ORDER — TRIAMCINOLONE ACETONIDE 40 MG/ML
40 INJECTION, SUSPENSION INTRA-ARTICULAR; INTRAMUSCULAR ONCE
Status: COMPLETED | OUTPATIENT
Start: 2021-08-27 | End: 2021-08-27

## 2021-08-27 RX ADMIN — TRIAMCINOLONE ACETONIDE 40 MG: 40 INJECTION, SUSPENSION INTRA-ARTICULAR; INTRAMUSCULAR at 12:12

## 2021-08-27 ASSESSMENT — PAIN SCALES - GENERAL: PAINLEVEL: EXTREME PAIN (8)

## 2021-08-27 NOTE — PROGRESS NOTES
Assessment & Plan     Primary osteoarthritis of left knee     After discussion of various treatment options, and after reviewing risks and benefits and potential complications. And after signing a consent form, we elected to proceed with a cortisone injection for both knee .  The lateral aspect of knee was cleansed , Betadine and Alcohol, then injected with 40 mg of Kenalog suspension along with 3 cc of 2% Lidocaine.  Patient tolerated injection well, givne post injection instructions.       - triamcinolone (KENALOG-40) injection 40 mg  - DRAIN/INJECT LARGE JOINT/BURSA    Primary osteoarthritis of right knee  Tolerated well  - triamcinolone (KENALOG-40) injection 40 mg  - DRAIN/INJECT LARGE JOINT/BURSA    Stage 3a chronic kidney disease  Stable, continue with current medications      Work on weight loss  Regular exercise    No follow-ups on file.    Purnima Nassar MD  [facility]    Genoveva Lacy is a 89 year old who presents for the following health issues  accompanied by his daughter:    HPI     Patient came in for bilateral knee injection due to chronic pain that is causing him not to sleep.  History of osteoarthritis of both of his knees, he reportedly has had cortisone injection, in the past    Continue take Tylenol as needed.    In the past few weeks he has been eating more stiffness in his knees, more pain, especially at night when he sleeps.    Review of Systems   Constitutional, HEENT, cardiovascular, pulmonary, gi and gu systems are negative, except as otherwise noted.      Objective    There were no vitals taken for this visit.  There is no height or weight on file to calculate BMI.  Physical Exam   GENERAL: healthy, alert and no distress  MS: Both knees exam, no swelling, no effusion.  Crepitation with full extension of both knees  SKIN: no suspicious lesions or rashes  PSYCH: mentation appears normal, affect normal/bright    Purnima Nassar MD

## 2021-09-07 ENCOUNTER — OFFICE VISIT (OUTPATIENT)
Dept: ORTHOPEDICS | Facility: CLINIC | Age: 86
End: 2021-09-07
Payer: COMMERCIAL

## 2021-09-07 ENCOUNTER — ANCILLARY PROCEDURE (OUTPATIENT)
Dept: GENERAL RADIOLOGY | Facility: CLINIC | Age: 86
End: 2021-09-07
Attending: FAMILY MEDICINE
Payer: COMMERCIAL

## 2021-09-07 ENCOUNTER — TELEPHONE (OUTPATIENT)
Dept: FAMILY MEDICINE | Facility: CLINIC | Age: 86
End: 2021-09-07

## 2021-09-07 VITALS
DIASTOLIC BLOOD PRESSURE: 75 MMHG | WEIGHT: 175 LBS | OXYGEN SATURATION: 98 % | BODY MASS INDEX: 25.92 KG/M2 | HEART RATE: 65 BPM | HEIGHT: 69 IN | SYSTOLIC BLOOD PRESSURE: 161 MMHG

## 2021-09-07 DIAGNOSIS — R07.81 RIB PAIN ON LEFT SIDE: ICD-10-CM

## 2021-09-07 DIAGNOSIS — R07.81 RIB PAIN ON LEFT SIDE: Primary | ICD-10-CM

## 2021-09-07 DIAGNOSIS — M17.0 PRIMARY OSTEOARTHRITIS OF BOTH KNEES: ICD-10-CM

## 2021-09-07 PROCEDURE — 99204 OFFICE O/P NEW MOD 45 MIN: CPT | Performed by: FAMILY MEDICINE

## 2021-09-07 PROCEDURE — 71101 X-RAY EXAM UNILAT RIBS/CHEST: CPT | Mod: LT | Performed by: RADIOLOGY

## 2021-09-07 RX ORDER — SENNOSIDES 8.6 MG
1300 CAPSULE ORAL EVERY 8 HOURS PRN
Qty: 360 TABLET | Refills: 3 | Status: SHIPPED | OUTPATIENT
Start: 2021-09-07 | End: 2023-04-24

## 2021-09-07 RX ORDER — NABUMETONE 500 MG/1
500 TABLET, FILM COATED ORAL 2 TIMES DAILY
Qty: 14 TABLET | Refills: 0 | Status: SHIPPED | OUTPATIENT
Start: 2021-09-07 | End: 2021-09-17

## 2021-09-07 ASSESSMENT — PAIN SCALES - GENERAL: PAINLEVEL: MILD PAIN (3)

## 2021-09-07 ASSESSMENT — MIFFLIN-ST. JEOR: SCORE: 1429.17

## 2021-09-07 NOTE — TELEPHONE ENCOUNTER
Reason for Call:  Other     Detailed comments: Patient feel yesterday and hurt rib and would like to be seen today.     Phone Number  Pau Feldman (DAUGHTER) [phone]         Best Time:     Can we leave a detailed message on this number? YES    Call taken on 9/7/2021 at 8:44 AM by Latoya Snyder

## 2021-09-07 NOTE — LETTER
9/7/2021         RE: Bambi Feldman  [address] [address]        Dear Colleague,    Thank you for referring your patient, Bambi Feldman, to the [facility]. Please see a copy of my visit note below.    ASSESSMENT & PLAN    Bambi was seen today for pain.    Diagnoses and all orders for this visit:    Rib pain on left side  -     XR Ribs & Chest LT G/E 3vw; Future  -     nabumetone (RELAFEN) 500 MG tablet; Take 1 tablet (500 mg) by mouth 2 times daily for 7 days    Primary osteoarthritis of both knees  -     acetaminophen (TYLENOL) 650 MG CR tablet; Take 2 tablets (1,300 mg) by mouth every 8 hours as needed for mild pain or fever      This issue is acute and Worsening.    # Left Rib Injury: Notable after a fall from standing injury to left side with notable pain over left ribs.  He does have tenderness to palpation over the left mid axillary region with normal O2 levels as well as lungs clear to auscultation bilaterally.  Reviewed x-ray today showing no significant rib fracture, no pneumothorax.  Etiology likely nondisplaced rib fracture versus contusion.  Given this plan to treat as below and follow-up if not improving.  Image Findings: no rib fracture  Treatment: Activities as tolerated, home exercises given today  Medications/Injections: Tylenol 1300 mg three times per day, Relafen two times per day  Follow-up: Primary care physician on 9/17/21.  Otherwise with sports medicine as needed.       Mikhail Murrell MD  [facility]    -----  Chief Complaint   Patient presents with     Chest - Pain     Left rib       SUBJECTIVE  Bambi Feldman is a/an 89 year old male who is seen as a self referral for evaluation of left side/rib.     The patient is seen with their daughter.    Onset: 1 day(s) ago. Patient describes injury as a fall after a dizzy spell and landed on his left side on concrete.   Location of Pain: left side,  "rib  Worsened by:  Pain increases getting up out of a chair and laying on the left side  Better with: NSAIDs - Tylenol and Aleve PM  Treatments tried: rest/activity avoidance, ice, Tylenol and Aleve  Associated symptoms: no distal numbness or tingling; denies swelling or warmth    Orthopedic/Surgical history: NO  Social History/Occupation: Retired    No family history pertinent to patient's problem today.     REVIEW OF SYSTEMS:  Review of Systems  Constitutional, HEENT, cardiovascular, pulmonary, GI, , musculoskeletal, neuro, skin, endocrine and psych systems are negative, except as otherwise noted.    OBJECTIVE:  BP (!) 161/75   Pulse 65   Ht 1.753 m (5' 9\")   Wt 79.4 kg (175 lb)   SpO2 98%   BMI 25.84 kg/m     General: healthy, alert and in no distress  HEENT: no scleral icterus or conjunctival erythema  Skin: no suspicious lesions or rash. No jaundice.  CV: distal perfusion intact    Resp: normal respiratory effort without conversational dyspnea, lungs clear to auscultation bilaterally  Psych: normal mood and affect  Gait: normal steady gait with appropriate coordination and balance   Neuro: Normal light sensory exam of left upper extremity     LEFT SHOULDER   Inspection:    no swelling, bruising, discoloration, or obvious deformity or asymmetry  Palpation:  TTP over the left axillary region  Active Range of Motion:     Abduction 1650, FF 1650, , IR L1.    Strength:    Scapular plane abduction 5/5,  ER 5/5, IR 5/5, biceps 5/5, triceps 5/5     RADIOLOGY:  I independently ordered, visualized and reviewed these images with the patient  No rib fracture, no pneumothorax    Review of the result(s) of each unique test - left rib x-[id number]             Again, thank you for allowing me to participate in the care of your patient.        Sincerely,        Mikhail Murrell MD    "

## 2021-09-07 NOTE — TELEPHONE ENCOUNTER
Called daughter-     Patient fell while exercising on carpet, he stood up and felt dizziness, and ended up falling on the concrete.  He did not hit is head.  He is having pain on his left side.    Pain on left side ribs 7/10.  He took some tylenol 650 mg.  No swelling, no discoloration, having a little bit harder time breathing due to the pain.    He has been taking aleve PM to go bed at night, been taking this for a couple of weeks. Has not noticed that this has affected his orthostatic hypotension.    [facility] ortho clinic for evaluation.     Victoria Saucedo RN

## 2021-09-07 NOTE — PROGRESS NOTES
ASSESSMENT & PLAN    Bambi was seen today for pain.    Diagnoses and all orders for this visit:    Rib pain on left side  -     XR Ribs & Chest LT G/E 3vw; Future  -     nabumetone (RELAFEN) 500 MG tablet; Take 1 tablet (500 mg) by mouth 2 times daily for 7 days    Primary osteoarthritis of both knees  -     acetaminophen (TYLENOL) 650 MG CR tablet; Take 2 tablets (1,300 mg) by mouth every 8 hours as needed for mild pain or fever      This issue is acute and Worsening.    # Left Rib Injury: Notable after a fall from standing injury to left side with notable pain over left ribs.  He does have tenderness to palpation over the left mid axillary region with normal O2 levels as well as lungs clear to auscultation bilaterally.  Reviewed x-ray today showing no significant rib fracture, no pneumothorax.  Etiology likely nondisplaced rib fracture versus contusion.  Given this plan to treat as below and follow-up if not improving.  Image Findings: no rib fracture  Treatment: Activities as tolerated, home exercises given today  Medications/Injections: Tylenol 1300 mg three times per day, Relafen two times per day  Follow-up: Primary care physician on 9/17/21.  Otherwise with sports medicine as needed.       Mikhail Murrell MD  [facility] [place]    -----  Chief Complaint   Patient presents with     Chest - Pain     Left rib       SUBJECTIVE  Bambi Feldman is a/an 89 year old male who is seen as a self referral for evaluation of left side/rib.     The patient is seen with their daughter.    Onset: 1 day(s) ago. Patient describes injury as a fall after a dizzy spell and landed on his left side on concrete.   Location of Pain: left side, rib  Worsened by:  Pain increases getting up out of a chair and laying on the left side  Better with: NSAIDs - Tylenol and Aleve PM  Treatments tried: rest/activity avoidance, ice, Tylenol and Aleve  Associated symptoms: no distal numbness or tingling; denies  "swelling or warmth    Orthopedic/Surgical history: NO  Social History/Occupation: Retired    No family history pertinent to patient's problem today.     REVIEW OF SYSTEMS:  Review of Systems  Constitutional, HEENT, cardiovascular, pulmonary, GI, , musculoskeletal, neuro, skin, endocrine and psych systems are negative, except as otherwise noted.    OBJECTIVE:  BP (!) 161/75   Pulse 65   Ht 1.753 m (5' 9\")   Wt 79.4 kg (175 lb)   SpO2 98%   BMI 25.84 kg/m     General: healthy, alert and in no distress  HEENT: no scleral icterus or conjunctival erythema  Skin: no suspicious lesions or rash. No jaundice.  CV: distal perfusion intact    Resp: normal respiratory effort without conversational dyspnea, lungs clear to auscultation bilaterally  Psych: normal mood and affect  Gait: normal steady gait with appropriate coordination and balance   Neuro: Normal light sensory exam of left upper extremity     LEFT SHOULDER   Inspection:    no swelling, bruising, discoloration, or obvious deformity or asymmetry  Palpation:  TTP over the left axillary region  Active Range of Motion:     Abduction 1650, FF 1650, , IR L1.    Strength:    Scapular plane abduction 5/5,  ER 5/5, IR 5/5, biceps 5/5, triceps 5/5     RADIOLOGY:  I independently ordered, visualized and reviewed these images with the patient  No rib fracture, no pneumothorax    Review of the result(s) of each unique test - left rib [id number]         "

## 2021-09-07 NOTE — PATIENT INSTRUCTIONS
# Left Rib Injury: Notable after a fall from standing injury to left side with notable pain over left ribs.  He does have tenderness to palpation over the left mid axillary region with normal O2 levels as well as lungs clear to auscultation bilaterally.  Reviewed x-ray today showing no significant rib fracture, no pneumothorax.  Etiology likely nondisplaced rib fracture versus contusion.  Given this plan to treat as below and follow-up if not improving.  Image Findings: no rib fracture  Treatment: Activities as tolerated, home exercises given today  Medications/Injections: Tylenol 1300 mg three times per day, Relafen two times per day  Follow-up: Primary care physician on 9/17/21.  Otherwise with sports medicine as needed.     Please call [phone]   Ask for my team if you have any questions or concerns    It was great seeing you today!    Mikhail Murrell MD, [facility]     Patient Education     Rib Contusion or Minor Fracture    A rib contusion is a bruise to one or more rib bones. It may cause pain, tenderness, swelling, and a purplish color to the skin. There may be a sharp pain with each breath. A rib contusion takes anywhere from a few days to a few weeks to heal. A minor rib fracture or break may cause the same symptoms as a rib contusion. The small crack may not be seen on a regular chest X-ray. Treatment for both problems is basically the same.  Home care    You may use over-the-counter pain medicine to control pain, unless another pain medicine was prescribed. If you have chronic liver or kidney disease or ever had a stomach ulcer or GI (gastrointestinal) bleeding, talk with your healthcare provider before using these medicines.    Rest. Don't lift anything heavy or do any activity that causes pain.    Apply an ice pack over the injured area for 15 to 20 minutes every 1 to 2 hours. You should do this for the first 24 to 48 hours. To make an ice pack, put ice cubes in a plastic bag that seals at the top.  Wrap the bag in a clean, thin towel or cloth. Never put ice or an ice pack directly on the skin. Continue with ice packs as needed for the relief of pain and swelling.    The first 3 to 4 weeks of healing will be the most painful. If your pain is not under control with the treatment given, call your healthcare provider. Sometimes a stronger pain medicine may be needed. A nerve block can be done in case of severe pain. It will numb the nerve between the ribs.  Follow-up care  Follow up with your healthcare provider, or as advised.  If X-rays were taken, you will be told of any new findings that may affect your care.  Call 911  Call 911 if you have:    Dizziness, weakness or fainting    Shortness of breath with or without chest discomfort    New or worsening pain  When to seek medical advice  Call your healthcare provider right away if any of these occur:    Fever of 100.4 F (38 C) or higher, or as directed by your healthcare provider    Chills    Stomach pain, vomiting  [employer] last reviewed this educational content on 6/1/2018 2000-2021 [employer]. All rights reserved. This information is not intended as a substitute for professional medical care. Always follow your healthcare professional's instructions.

## 2021-09-17 ENCOUNTER — OFFICE VISIT (OUTPATIENT)
Dept: FAMILY MEDICINE | Facility: CLINIC | Age: 86
End: 2021-09-17
Payer: COMMERCIAL

## 2021-09-17 VITALS
DIASTOLIC BLOOD PRESSURE: 70 MMHG | HEIGHT: 67 IN | WEIGHT: 174 LBS | TEMPERATURE: 97.9 F | OXYGEN SATURATION: 96 % | RESPIRATION RATE: 20 BRPM | HEART RATE: 82 BPM | SYSTOLIC BLOOD PRESSURE: 134 MMHG | BODY MASS INDEX: 27.31 KG/M2

## 2021-09-17 DIAGNOSIS — I10 HYPERTENSION GOAL BP (BLOOD PRESSURE) < 140/90: ICD-10-CM

## 2021-09-17 DIAGNOSIS — M17.0 PRIMARY OSTEOARTHRITIS OF BOTH KNEES: ICD-10-CM

## 2021-09-17 DIAGNOSIS — S20.212D CONTUSION OF RIB ON LEFT SIDE, SUBSEQUENT ENCOUNTER: ICD-10-CM

## 2021-09-17 DIAGNOSIS — G47.09 OTHER INSOMNIA: ICD-10-CM

## 2021-09-17 DIAGNOSIS — N18.30 STAGE 3 CHRONIC KIDNEY DISEASE, UNSPECIFIED WHETHER STAGE 3A OR 3B CKD (H): ICD-10-CM

## 2021-09-17 DIAGNOSIS — K59.00 CONSTIPATION, UNSPECIFIED CONSTIPATION TYPE: ICD-10-CM

## 2021-09-17 DIAGNOSIS — Z00.00 ENCOUNTER FOR MEDICARE ANNUAL WELLNESS EXAM: Primary | ICD-10-CM

## 2021-09-17 PROBLEM — F41.9 ANXIETY: Status: ACTIVE | Noted: 2021-09-17

## 2021-09-17 PROBLEM — M67.432 GANGLION CYST OF WRIST, LEFT: Status: RESOLVED | Noted: 2018-03-22 | Resolved: 2021-09-17

## 2021-09-17 PROBLEM — L71.9 ROSACEA: Status: ACTIVE | Noted: 2021-09-17

## 2021-09-17 PROBLEM — N40.0 BENIGN PROSTATIC HYPERPLASIA: Status: ACTIVE | Noted: 2021-09-17

## 2021-09-17 PROBLEM — C44.91 BASAL CELL CARCINOMA OF SKIN: Status: ACTIVE | Noted: 2021-09-17

## 2021-09-17 PROBLEM — D04.9 SQUAMOUS CELL CARCINOMA IN SITU (SCCIS) OF SKIN: Status: ACTIVE | Noted: 2021-09-17

## 2021-09-17 PROBLEM — R97.20 HIGH PROSTATE SPECIFIC ANTIGEN (PSA): Status: ACTIVE | Noted: 2021-09-17

## 2021-09-17 PROBLEM — G47.9 DYSSOMNIA: Status: ACTIVE | Noted: 2021-09-17

## 2021-09-17 LAB
ANION GAP SERPL CALCULATED.3IONS-SCNC: 7 MMOL/L (ref 3–14)
BUN SERPL-MCNC: 20 MG/DL (ref 7–30)
CALCIUM SERPL-MCNC: 8.6 MG/DL (ref 8.5–10.1)
CHLORIDE BLD-SCNC: 91 MMOL/L (ref 94–109)
CO2 SERPL-SCNC: 24 MMOL/L (ref 20–32)
CREAT SERPL-MCNC: 1.17 MG/DL (ref 0.66–1.25)
GFR SERPL CREATININE-BSD FRML MDRD: 53 ML/MIN/1.73M2
GLUCOSE BLD-MCNC: 105 MG/DL (ref 70–99)
POTASSIUM BLD-SCNC: 4.9 MMOL/L (ref 3.4–5.3)
SODIUM SERPL-SCNC: 122 MMOL/L (ref 133–144)

## 2021-09-17 PROCEDURE — 80048 BASIC METABOLIC PNL TOTAL CA: CPT | Performed by: FAMILY MEDICINE

## 2021-09-17 PROCEDURE — 36415 COLL VENOUS BLD VENIPUNCTURE: CPT | Performed by: FAMILY MEDICINE

## 2021-09-17 PROCEDURE — 99397 PER PM REEVAL EST PAT 65+ YR: CPT | Mod: 25 | Performed by: FAMILY MEDICINE

## 2021-09-17 PROCEDURE — G0008 ADMIN INFLUENZA VIRUS VAC: HCPCS | Performed by: FAMILY MEDICINE

## 2021-09-17 PROCEDURE — 99214 OFFICE O/P EST MOD 30 MIN: CPT | Mod: 25 | Performed by: FAMILY MEDICINE

## 2021-09-17 PROCEDURE — 90662 IIV NO PRSV INCREASED AG IM: CPT | Performed by: FAMILY MEDICINE

## 2021-09-17 RX ORDER — TRAMADOL HYDROCHLORIDE 50 MG/1
50 TABLET ORAL 2 TIMES DAILY PRN
Qty: 30 TABLET | Refills: 0 | Status: SHIPPED | OUTPATIENT
Start: 2021-09-17 | End: 2021-11-02

## 2021-09-17 RX ORDER — LISINOPRIL 10 MG/1
10 TABLET ORAL DAILY
Qty: 90 TABLET | Refills: 1 | Status: SHIPPED | OUTPATIENT
Start: 2021-09-17 | End: 2022-03-28

## 2021-09-17 RX ORDER — DIPHENHYDRAMINE CITRATE AND IBUPROFEN 38; 200 MG/1; MG/1
1 TABLET, COATED ORAL
COMMUNITY
End: 2023-04-24

## 2021-09-17 RX ORDER — POLYETHYLENE GLYCOL 3350 17 G/17G
1 POWDER, FOR SOLUTION ORAL DAILY
Qty: 507 G | Refills: 11 | Status: SHIPPED | OUTPATIENT
Start: 2021-09-17

## 2021-09-17 RX ORDER — MIRTAZAPINE 15 MG/1
TABLET, FILM COATED ORAL
Qty: 90 TABLET | Refills: 3 | Status: SHIPPED | OUTPATIENT
Start: 2021-09-17 | End: 2021-11-02

## 2021-09-17 ASSESSMENT — MIFFLIN-ST. JEOR: SCORE: 1392.89

## 2021-09-17 ASSESSMENT — PAIN SCALES - GENERAL: PAINLEVEL: MODERATE PAIN (4)

## 2021-09-17 NOTE — PROGRESS NOTES
"  SUBJECTIVE:   Bambi Feldman is a 89 year old male who presents for Preventive Visit.    Patient has been advised of split billing requirements and indicates understanding: Yes  Are you in the first 12 months of your Medicare Part B coverage?  No    Physical Health:    In general, how would you rate your overall physical health? poor    Outside of work, how many days during the week do you exercise? none    Outside of work, approximately how many minutes a day do you exercise?not applicable    If you drink alcohol do you typically have >3 drinks per day or >7 drinks per week? No    Do you usually eat at least 4 servings of fruit and vegetables a day, include whole grains & fiber and avoid regularly eating high fat or \"junk\" foods? Yes    Do you have any problems taking medications regularly?  No    Do you have any side effects from medications? none    Needs assistance for the following daily activities: no assistance needed    Which of the following safety concerns are present in your home?  none identified     Hearing impairment: No    In the past 6 months, have you been bothered by leaking of urine? no    Mental Health:    In general, how would you rate your overall mental or emotional health? fair  PHQ-2 Score:      Do you feel safe in your environment? Yes    Have you ever done Advance Care Planning? (For example, a Health Directive, POLST, or a discussion with a medical provider or your loved ones about your wishes): No, advance care planning information given to patient to review.  Patient plans to discuss their wishes with loved ones or provider.      Additional concerns to address?  YES- Follow-up on fall, continues having left sided rib pain- saw Sports Ortho on 9/7/21.     The patient reports that he got dizzy and fell onto the concrete on his left side.  He has had pain in the rib cage since the fall on 9/6/2021.  He had negative x-rays when he was seen by Ortho on 9/7/2021.  The patient was given a " muscle relaxer and Tylenol for pain.  It is hard to sleep.  Tyelnol helps a few hours and he is taking advil pm.      Would like to get rx for Miralax    Fall risk:  Fallen 2 or more times in the past year?: No  Any fall with injury in the past year?: Yes    Cognitive Screenin) Repeat 3 items (Leader, Season, Table)    2) Clock draw: NORMAL  3) 3 item recall: Recalls 2 objects   Results: NORMAL clock, 1-2 items recalled: COGNITIVE IMPAIRMENT LESS LIKELY    Mini-CogTM Copyright TAMIKA Howell. Licensed by the author for use in [employer]; reprinted with permission (diamante@[facility]). All rights reserved.      Do you have sleep apnea, excessive snoring or daytime drowsiness?: no      Hypertension Follow-up      Do you check your blood pressure regularly outside of the clinic? No     Are you following a low salt diet? Yes    Are your blood pressures ever more than 140 on the top number (systolic) OR more   than 90 on the bottom number (diastolic), for example 140/90? No   Lisinopril 10 mg daily    The patient takes Remeron 15 mg at night for sleep every night.      Reviewed and updated as needed this visit by clinical staff  Tobacco  Allergies  Meds   Med Hx  Surg Hx  Fam Hx  Soc Hx        Reviewed and updated as needed this visit by Provider                Social History     Tobacco Use     Smoking status: Former Smoker     Quit date: 1970     Years since quittin.6     Smokeless tobacco: Never Used   Substance Use Topics     Alcohol use: Yes     Comment: 1 drink per night                            Current providers sharing in care for this patient include:   Patient Care Team:  Chandrika Cordova DO as PCP - General (Family Practice)  Chandrika Cordova DO as Assigned PCP    The following health maintenance items are reviewed in Epic and correct as of today:  Health Maintenance   Topic Date Due     ANNUAL REVIEW OF HM ORDERS  Never done     ZOSTER IMMUNIZATION (2 of 3) 2009     LIPID  10/17/2020  "    FALL RISK ASSESSMENT  02/04/2021     INFLUENZA VACCINE (1) 09/01/2021     BMP  09/15/2021     MEDICARE ANNUAL WELLNESS VISIT  09/17/2022     ADVANCE CARE PLANNING  08/08/2023     DTAP/TDAP/TD IMMUNIZATION (4 - Td or Tdap) 10/30/2028     PHQ-2  Completed     Pneumococcal Vaccine: 65+ Years  Completed     COVID-19 Vaccine  Completed     IPV IMMUNIZATION  Aged Out     MENINGITIS IMMUNIZATION  Aged Out     HEPATITIS B IMMUNIZATION  Aged Out     BP Readings from Last 3 Encounters:   09/17/21 134/70   09/07/21 (!) 161/75   08/27/21 120/60    Wt Readings from Last 3 Encounters:   09/17/21 78.9 kg (174 lb)   09/07/21 79.4 kg (175 lb)   08/27/21 79.4 kg (175 lb)                  Pneumonia Vaccine:Adults age 65+ who received Pneumovax (PPSV23) at 65 years or older: Should be given PCV13 > 1 year after their most recent PPSV23    ROS:  Constitutional, HEENT, cardiovascular, pulmonary, GI, , musculoskeletal, neuro, skin, endocrine and psych systems are negative, except as otherwise noted.    OBJECTIVE:   /70 (BP Location: Right arm, Patient Position: Sitting, Cuff Size: Adult Large)   Pulse 82   Temp 97.9  F (36.6  C) (Oral)   Resp 20   Ht 1.702 m (5' 7\")   Wt 78.9 kg (174 lb)   SpO2 96%   BMI 27.25 kg/m   Estimated body mass index is 27.25 kg/m  as calculated from the following:    Height as of this encounter: 1.702 m (5' 7\").    Weight as of this encounter: 78.9 kg (174 lb).  EXAM:    GENERAL: alert, no distress and elderly  EYES: Eyes grossly normal to inspection, PERRL and conjunctivae and sclerae normal  HENT: ear canals and TM's normal, nose and mouth without ulcers or lesions  NECK: no adenopathy, no asymmetry, masses, or scars and thyroid normal to palpation  RESP: lungs clear to auscultation - no rales, rhonchi or wheezes  CV: regular rate and rhythm, normal S1 S2, no S3 or S4, no murmur, click or rub, no peripheral edema and peripheral pulses strong  ABDOMEN: soft, nontender, no hepatosplenomegaly, " no masses and bowel sounds normal  MS: tenderness to palpation ribs 7 and 8 on the left side  SKIN: no suspicious lesions or rashes  NEURO: Normal strength and tone, mentation intact and speech normal  PSYCH: mentation appears normal, affect normal/bright    Diagnostic Test Results:  Labs reviewed in Epic  No results found for this or any previous visit (from the past 24 hour(s)).    ASSESSMENT / PLAN:   (Z00.00) Encounter for Medicare annual wellness exam  (primary encounter diagnosis)  Comment:   Plan:     (S20.212D) Contusion of rib on left side, subsequent encounter  Comment: The patient has not sleeping due to pain.  He is taking Advil PM despite have chronic kidney disease.  Tramadol is going to be safer for him than NSAID and Benadryl.  Benadryl is associated with hallucinations in the elderly  Plan: traMADol (ULTRAM) 50 MG tablet            (I10) Hypertension goal BP (blood pressure) < 140/90  Comment: The current medical regimen is effective;  continue present plan and medications.    Plan: BASIC METABOLIC PANEL, lisinopril (ZESTRIL) 10         MG tablet, OFFICE/OUTPT VISIT,EST,LEVL IV            (M17.0) Primary osteoarthritis of both knees  Comment: Since responded well to exercise.  He does not exercise bike   Plan: OFFICE/OUTPT VISIT,EST,LEVL IV            (N18.30) Stage 3 chronic kidney disease, unspecified whether stage 3a or 3b CKD  Comment: I have suggested the patient avoid NSAIDs  Plan: OFFICE/OUTPT VISIT,EST,LEVL IV            (K59.00) Constipation, unspecified constipation type  Comment: The patient is stable on MiraLAX daily  Plan: polyethylene glycol (MIRALAX) 17 GM/Dose         powder, OFFICE/OUTPT VISIT,EST,LEVL IV            (G47.09) Other insomnia  Comment: Tramadol and Remeron have a slight drug interaction so we will monitor for serotonin symptoms  Plan: mirtazapine (REMERON) 15 MG tablet,         OFFICE/OUTPT VISIT,EST,LEVL IV                  COUNSELING:  Reviewed preventive health  "counseling, as reflected in patient instructions       Healthy diet/nutrition    Estimated body mass index is 27.25 kg/m  as calculated from the following:    Height as of this encounter: 1.702 m (5' 7\").    Weight as of this encounter: 78.9 kg (174 lb).        He reports that he quit smoking about 51 years ago. He has never used smokeless tobacco.    Appropriate preventive services were discussed with this patient, including applicable screening as appropriate for cardiovascular disease, diabetes, osteopenia/osteoporosis, and glaucoma.  As appropriate for age/gender, discussed screening for colorectal cancer, prostate cancer, breast cancer, and cervical cancer. Checklist reviewing preventive services available has been given to the patient.    Reviewed patients plan of care and provided an AVS. The Basic Care Plan (routine screening as documented in Health Maintenance) for Bambi meets the Care Plan requirement. This Care Plan has been established and reviewed with the Patient and daughter.    Counseling Resources:  ATP IV Guidelines  Pooled Cohorts Equation Calculator  Breast Cancer Risk Calculator  BRCA-Related Cancer Risk Assessment: FHS-7 Tool  FRAX Risk Assessment  ICSI Preventive Guidelines  Dietary Guidelines for Americans, 2010  USDA's MyPlate  ASA Prophylaxis  Lung CA Screening    DO TIERRA Sanchez [facility]  "

## 2021-09-17 NOTE — PATIENT INSTRUCTIONS
Patient Education   Personalized Prevention Plan  You are due for the preventive services outlined below.  Your care team is available to assist you in scheduling these services.  If you have already completed any of these items, please share that information with your care team to update in your medical record.  Health Maintenance Due   Topic Date Due     ANNUAL REVIEW OF HM ORDERS  Never done     Zoster (Shingles) Vaccine (2 of 3) 05/12/2009     Annual Wellness Visit  03/10/2017     Cholesterol Lab  10/17/2020     FALL RISK ASSESSMENT  02/04/2021     Flu Vaccine (1) 09/01/2021     Basic Metabolic Panel  09/15/2021     Preventive Health Recommendations  See your health care provider every year to    Review health changes.     Discuss preventive care.      Review your medicines if your doctor has prescribed any.    Talk with your health care provider about whether you should have a test to screen for prostate cancer (PSA).    Every 3 years, have a diabetes test (fasting glucose). If you are at risk for diabetes, you should have this test more often.    Every 5 years, have a cholesterol test. Have this test more often if you are at risk for high cholesterol or heart disease.     Every 10 years, have a colonoscopy. Or, have a yearly FIT test (stool test). These exams will check for colon cancer.    Talk to with your health care provider about screening for Abdominal Aortic Aneurysm if you have a family history of AAA or have a history of smoking.    Shots:     Get a flu shot each year.     Get a tetanus shot every 10 years.     Talk to your doctor about your pneumonia vaccines. There are now two you should receive - Pneumovax (PPSV 23) and Prevnar (PCV 13).    Talk to your pharmacist about a shingles vaccine.     Talk to your doctor about the hepatitis B vaccine.    Nutrition:     Eat at least 5 servings of fruits and vegetables each day.     Eat whole-grain bread, whole-wheat pasta and brown rice instead of white  grains and rice.     Get adequate Calcium and Vitamin D.     Lifestyle    Exercise for at least 150 minutes a week (30 minutes a day, 5 days a week). This will help you control your weight and prevent disease.     Limit alcohol to one drink per day.     No smoking.     Wear sunscreen to prevent skin cancer.     See your dentist every six months for an exam and cleaning.     See your eye doctor every 1 to 2 years to screen for conditions such as glaucoma, macular degeneration and cataracts.    Personalized Prevention Plan  You are due for the preventive services outlined below.  Your care team is available to assist you in scheduling these services.  If you have already completed any of these items, please share that information with your care team to update in your medical record.  Health Maintenance   Topic Date Due     ANNUAL REVIEW OF HM ORDERS  Never done     ZOSTER IMMUNIZATION (2 of 3) 05/12/2009     MEDICARE ANNUAL WELLNESS VISIT  03/10/2017     LIPID  10/17/2020     FALL RISK ASSESSMENT  02/04/2021     INFLUENZA VACCINE (1) 09/01/2021     BMP  09/15/2021     ADVANCE CARE PLANNING  08/08/2023     DTAP/TDAP/TD IMMUNIZATION (4 - Td or Tdap) 10/30/2028     PHQ-2  Completed     Pneumococcal Vaccine: 65+ Years  Completed     COVID-19 Vaccine  Completed     IPV IMMUNIZATION  Aged Out     MENINGITIS IMMUNIZATION  Aged Out     HEPATITIS B IMMUNIZATION  Aged Out

## 2021-09-19 ENCOUNTER — TELEPHONE (OUTPATIENT)
Dept: FAMILY MEDICINE | Facility: CLINIC | Age: 86
End: 2021-09-19

## 2021-09-20 NOTE — TELEPHONE ENCOUNTER
Please call this patient.  His sodium is very low.  But he is not on any medication that would cause his sodium to be low.  I want him to [place] get this redrawn to see if it was a mistake.  Also please triage him for symptoms of hyponatremia but I have copied and pasted from up-to-date below    Mild to moderate symptoms - Mild to moderate symptoms of hyponatremia are relatively nonspecific and include headache, fatigue, lethargy, nausea, vomiting, dizziness, gait disturbances, forgetfulness, confusion, and muscle cramps     If he is having any of these symptoms he should go to the emergency room to get retested and further evaluated.    Have him reduce how much water he is drinking to no more than a liter a day while we are working this up.    If the family have any questions please call me out of a room to talk to them.    Chandrika Cordova DO

## 2021-09-20 NOTE — TELEPHONE ENCOUNTER
Spoke with pt's daughter (pt gave verbal permission)    He has been having diarrhea for the past 4-5 days, and having hard time sleeping, he is feeling tired as well.    The pain in his side is improving with Tramadol, he denies dizziness, muscle cramps, headaches...    PCP present when during phone conversation- recommend going to to ED. With diarrhea symptoms and previous hyponatremia, there is a concern patient will decline in health quickly.  Encouraged patient to go to ED, initially states [facility] but has never been there.  May be best due to patient history [facility] so all [facility] are there.  They are agreeable to this plan.  Call back if further questions arise.    Victoria Saucedo RN

## 2021-11-02 ENCOUNTER — OFFICE VISIT (OUTPATIENT)
Dept: FAMILY MEDICINE | Facility: CLINIC | Age: 86
End: 2021-11-02
Payer: COMMERCIAL

## 2021-11-02 VITALS
BODY MASS INDEX: 27.08 KG/M2 | HEART RATE: 92 BPM | SYSTOLIC BLOOD PRESSURE: 130 MMHG | TEMPERATURE: 97.6 F | OXYGEN SATURATION: 98 % | WEIGHT: 172.9 LBS | RESPIRATION RATE: 24 BRPM | DIASTOLIC BLOOD PRESSURE: 70 MMHG

## 2021-11-02 DIAGNOSIS — M17.12 PRIMARY OSTEOARTHRITIS OF LEFT KNEE: Primary | ICD-10-CM

## 2021-11-02 DIAGNOSIS — S20.212D CONTUSION OF RIB ON LEFT SIDE, SUBSEQUENT ENCOUNTER: ICD-10-CM

## 2021-11-02 DIAGNOSIS — M17.11 PRIMARY OSTEOARTHRITIS OF RIGHT KNEE: ICD-10-CM

## 2021-11-02 PROCEDURE — 20610 DRAIN/INJ JOINT/BURSA W/O US: CPT | Mod: 50 | Performed by: FAMILY MEDICINE

## 2021-11-02 PROCEDURE — 99213 OFFICE O/P EST LOW 20 MIN: CPT | Mod: 25 | Performed by: FAMILY MEDICINE

## 2021-11-02 RX ORDER — TRIAMCINOLONE ACETONIDE 40 MG/ML
40 INJECTION, SUSPENSION INTRA-ARTICULAR; INTRAMUSCULAR ONCE
Status: COMPLETED | OUTPATIENT
Start: 2021-11-02 | End: 2021-11-02

## 2021-11-02 RX ORDER — TRAMADOL HYDROCHLORIDE 50 MG/1
50 TABLET ORAL 2 TIMES DAILY PRN
Qty: 30 TABLET | Refills: 0 | Status: SHIPPED | OUTPATIENT
Start: 2021-11-02 | End: 2021-11-02

## 2021-11-02 RX ORDER — PHENOL 1.4 %
5 AEROSOL, SPRAY (ML) MUCOUS MEMBRANE
COMMUNITY

## 2021-11-02 RX ORDER — TRAMADOL HYDROCHLORIDE 50 MG/1
TABLET ORAL
Qty: 30 TABLET | Refills: 0 | Status: SHIPPED | OUTPATIENT
Start: 2021-11-02 | End: 2022-09-26

## 2021-11-02 RX ORDER — GABAPENTIN 300 MG/1
300 CAPSULE ORAL AT BEDTIME
Qty: 30 CAPSULE | Refills: 3 | Status: SHIPPED | OUTPATIENT
Start: 2021-11-02 | End: 2022-09-26

## 2021-11-02 RX ADMIN — TRIAMCINOLONE ACETONIDE 40 MG: 40 INJECTION, SUSPENSION INTRA-ARTICULAR; INTRAMUSCULAR at 18:01

## 2021-11-02 RX ADMIN — TRIAMCINOLONE ACETONIDE 40 MG: 40 INJECTION, SUSPENSION INTRA-ARTICULAR; INTRAMUSCULAR at 18:00

## 2021-11-02 ASSESSMENT — PAIN SCALES - GENERAL: PAINLEVEL: EXTREME PAIN (9)

## 2021-11-02 NOTE — PROGRESS NOTES
Assessment & Plan     Primary osteoarthritis of left knee     After discussion of various treatment options, and after reviewing risks and benefits and potential complications. And after signing a consent form, we elected to proceed with a cortisone injection for both knees.  The lateral aspect of  both knees were cleansed , Betadine and Alcohol, then injected with 40 mg of Kenalog suspension along with 3 cc of 2% Lidocaine., each knee.  Patient tolerated injection well, givne post injection instructions.       - gabapentin (NEURONTIN) 300 MG capsule; Take 1 capsule (300 mg) by mouth At Bedtime  - traMADol (ULTRAM) 50 MG tablet; One tab daily as needed  - triamcinolone (KENALOG-40) injection 40 mg  - DRAIN/INJECT LARGE JOINT/BURSA    Primary osteoarthritis of right knee  As above  - gabapentin (NEURONTIN) 300 MG capsule; Take 1 capsule (300 mg) by mouth At Bedtime  - traMADol (ULTRAM) 50 MG tablet; One tab daily as needed  - triamcinolone (KENALOG-40) injection 40 mg  - DRAIN/INJECT LARGE JOINT/BURSA    Contusion of rib on left side, subsequent encounter    - traMADol (ULTRAM) 50 MG tablet; One tab daily as needed    Patient reports continued to have pain especially at the end of the day, at night with his knees, unable to sleep.  He has been taking Tylenol only during the daytime.  In addition, take  Remeron is not helping.    Discontinued Remeron,  Started gabapentin, 300 mg 1 tablet at bedtime.  In addition, Tylenol 3 times a day as needed.  In addition tramadol 1 tablet as needed during the daytime with severe knees pain       No follow-ups on file.    Purnima Nassar MD  [facility]    Genoveva Lacy is a 89 year old who presents for the following health issues  accompanied by his daughter.    Patient wants cortison injection in both knees due to arthritis pain. He stated that the pain is causing lack of sleep.  Patient with history of osteoarthritis of both knees, last time  he had cortisone injection was almost 10 weeks ago.  He reported did help for at least 6 weeks.  Now for the past 4 weeks he has been having increased pain in his knees, has been having difficulty sleeping at night because of the pain.  He has been taking Tylenol 1000 mg one time during day time.    No recent falls    Review of Systems   Constitutional, HEENT, cardiovascular, pulmonary, gi and gu systems are negative, except as otherwise noted.      Objective    There were no vitals taken for this visit.  There is no height or weight on file to calculate BMI.  Physical Exam   GENERAL: healthy, alert and no distress  PSYCH: mentation appears normal, affect normal/bright    Orders Placed This Encounter   Procedures     DRAIN/INJECT LARGE JOINT/BURSA           Purnima Nassar MD

## 2021-12-28 ENCOUNTER — TELEPHONE (OUTPATIENT)
Dept: FAMILY MEDICINE | Facility: CLINIC | Age: 86
End: 2021-12-28
Payer: COMMERCIAL

## 2021-12-28 DIAGNOSIS — G47.09 OTHER INSOMNIA: ICD-10-CM

## 2021-12-28 RX ORDER — MIRTAZAPINE 15 MG/1
TABLET, FILM COATED ORAL
Qty: 90 TABLET | Refills: 3 | OUTPATIENT
Start: 2021-12-28

## 2021-12-28 NOTE — TELEPHONE ENCOUNTER
[id number] faxed RX Image re: mirtazapine (REMERON) 15 MG tablet    Pt looking for refill.    2nd Request 12/27    mirtazapine (REMERON) 15 MG tablet  DISCONTINUED        Last Written Prescription Date:  9/17/2021  Last Fill Quantity: 90 tablet,   # refills: 3  Last Office Visit: 11/2/2021    Future Office visit:       Routing refill request to provider for review/approval because:  Drug not active on patient's medication list

## 2022-01-11 ENCOUNTER — TELEPHONE (OUTPATIENT)
Dept: FAMILY MEDICINE | Facility: CLINIC | Age: 87
End: 2022-01-11
Payer: COMMERCIAL

## 2022-01-11 DIAGNOSIS — G47.09 OTHER INSOMNIA: ICD-10-CM

## 2022-01-11 RX ORDER — MIRTAZAPINE 15 MG/1
TABLET, FILM COATED ORAL
Qty: 90 TABLET | Refills: 0 | OUTPATIENT
Start: 2022-01-11

## 2022-01-12 RX ORDER — MIRTAZAPINE 15 MG/1
TABLET, FILM COATED ORAL
Qty: 90 TABLET | Refills: 1 | Status: SHIPPED | OUTPATIENT
Start: 2022-01-12 | End: 2022-09-28

## 2022-01-12 NOTE — TELEPHONE ENCOUNTER
"Daughter, Pau calling.  She stated that gabapentin did not work so patient stopped taking \"for a while now.\"  He has been back on mirtazapine 15 mg    Requesting a refill on the mirtazapine and a return call once completed.      Claudy Caldwell RN  [facility]    "

## 2022-01-28 ENCOUNTER — OFFICE VISIT (OUTPATIENT)
Dept: FAMILY MEDICINE | Facility: CLINIC | Age: 87
End: 2022-01-28
Payer: COMMERCIAL

## 2022-01-28 VITALS
OXYGEN SATURATION: 98 % | DIASTOLIC BLOOD PRESSURE: 74 MMHG | SYSTOLIC BLOOD PRESSURE: 134 MMHG | RESPIRATION RATE: 20 BRPM | WEIGHT: 173 LBS | TEMPERATURE: 98.9 F | HEART RATE: 84 BPM | BODY MASS INDEX: 27.15 KG/M2 | HEIGHT: 67 IN

## 2022-01-28 DIAGNOSIS — M54.50 ACUTE BILATERAL LOW BACK PAIN WITHOUT SCIATICA: Primary | ICD-10-CM

## 2022-01-28 DIAGNOSIS — N18.30 STAGE 3 CHRONIC KIDNEY DISEASE, UNSPECIFIED WHETHER STAGE 3A OR 3B CKD (H): ICD-10-CM

## 2022-01-28 PROCEDURE — 99215 OFFICE O/P EST HI 40 MIN: CPT | Performed by: FAMILY MEDICINE

## 2022-01-28 ASSESSMENT — MIFFLIN-ST. JEOR: SCORE: 1383.35

## 2022-01-28 ASSESSMENT — PAIN SCALES - GENERAL: PAINLEVEL: MODERATE PAIN (5)

## 2022-01-28 NOTE — PROGRESS NOTES
Assessment & Plan     Acute bilateral low back pain without sciatica  The patient does not have symptoms of compression fracture so no x-ray was done at this time.  I am concerned about lumbar stenosis.  I have ordered a MRI and referred him to pain management for an injection.  We will give him Tylenol three for pain control while he tries to sleep.    - MR Lumbar Spine w/o Contrast; Future  - Pain Management Referral; Future  - acetaminophen-codeine (TYLENOL #3) 300-30 MG tablet; Take 1 tablet by mouth every 6 hours as needed for severe pain    Stage 3 chronic kidney disease, unspecified whether stage 3a or 3b CKD (H)  Needs to avoid NSAIDs due to his kidney disease      40 minutes spent on the date of the encounter doing chart review, history and exam, documentation and further activities per the note         Return in about 2 weeks (around 2/11/2022) for pain.    Chandrika Cordova DO  [facility]   Bambi is a 89 year old who presents for the following health issues  accompanied by his daughter.    HPI     Pain History:  When did you first notice your pain? - 1 to 6 weeks   Have you seen any provider previously for this issue? No  How has your pain affected your ability to work? Not applicable  Where in your body do you have pain? Back Pain  Onset/Duration: 1-2 weeks  Description:   Location of pain: low back bilateral  Character of pain: sharp  Pain radiation: none  New numbness or weakness in legs, not attributed to pain: no   Intensity: Currently 5/10, At its worst 9-10/10  Progression of Symptoms: same and intermittent  History:   Specific cause: none  Pain interferes with job: no  History of back problems: no prior back problems  Any previous MRI or X-rays: None  Sees a specialist for back pain: No  Alleviating factors:   Improved by: acetaminophen (Tylenol)    Precipitating factors:  Worsened by: Standing, Sitting and Lying Flat  Therapies tried and outcome:  "acetaminophen (Tylenol) 8 hour, Tramadol    Accompanying Signs & Symptoms:  Risk of Fracture: Age no trauma  Risk of Cauda Equina: None  Risk of Infection: None  Risk of Cancer: None  Risk of Ankylosing Spondylitis: Onset at age <35, male, AND morning back stiffness  no         Review of Systems   Constitutional, HEENT, cardiovascular, pulmonary, gi and gu systems are negative, except as otherwise noted.      Objective    /74 (BP Location: Right arm, Patient Position: Sitting, Cuff Size: Adult Regular)   Pulse 84   Temp 98.9  F (37.2  C) (Tympanic)   Resp 20   Ht 1.702 m (5' 7\")   Wt 78.5 kg (173 lb)   SpO2 98%   BMI 27.10 kg/m    Body mass index is 27.1 kg/m .  Physical Exam   GENERAL: alert, no distress, frail and elderly  RESP: lungs clear to auscultation - no rales, rhonchi or wheezes  CV: regular rate and rhythm, normal S1 S2, no S3 or S4, no murmur, click or rub, no peripheral edema and peripheral pulses strong  MS: no gross musculoskeletal defects noted, no edema  SKIN: no suspicious lesions or rashes  NEURO: Normal strength and tone, mentation intact and speech normal  Comprehensive back pain exam:  Tenderness of Paraspinal muscles bilaterally and maximum tenderness at the sacral lumbar junction.  No spinous process tenderness and Reduce extension and flexion  PSYCH: mentation appears normal, affect normal/bright      "

## 2022-02-01 ENCOUNTER — TELEPHONE (OUTPATIENT)
Dept: ANESTHESIOLOGY | Facility: CLINIC | Age: 87
End: 2022-02-01
Payer: COMMERCIAL

## 2022-02-01 DIAGNOSIS — M47.816 LUMBAR SPONDYLOSIS: Primary | ICD-10-CM

## 2022-02-01 NOTE — TELEPHONE ENCOUNTER
I spoke with Dr. Stephenson about this patient's pain and she is planning on  getting him again sooner for some injections.  Her office will contact him to set this up.    Chandrika Cordova DO

## 2022-02-01 NOTE — TELEPHONE ENCOUNTER
Dr Stephenson with pain clinic    Pain med consultation- pt called scheduling line-     Consultation appointment are 3 months out.  Does this patient need to be seen sooner? Pt may be able to get injection sooner depending on how she was presenting and then have a consultation.    Dr Stephenson prefers to speak directly with PCP- she will be available till 4pm     Can call directly on cell number [phone]       Victoria Saucedo RN

## 2022-02-01 NOTE — TELEPHONE ENCOUNTER
[facility] Call Center    Phone Message    May a detailed message be left on voicemail: yes     Reason for Call: Appointment Intake    Referring Provider Name: Chandrika Cordova DO Location:   [facility]    Referral #:[id number]     Diagnosis and/or Symptoms: Acute bilateral low back pain without sciatica [M54.50]    The patient needs to get scheduled for his consultation asap I spoke with Aracely and she said she ll review and follow up, they will get the MRI scheduled asap so that can available for the pain clinic to review if needed prior to the consultation thank you.    Action Taken: Message routed to:  [facility] pain    Travel Screening: Not Applicable

## 2022-02-02 ENCOUNTER — TELEPHONE (OUTPATIENT)
Dept: ANESTHESIOLOGY | Facility: CLINIC | Age: 87
End: 2022-02-02
Payer: COMMERCIAL

## 2022-02-02 PROBLEM — M47.816 LUMBAR SPONDYLOSIS: Status: ACTIVE | Noted: 2022-02-02

## 2022-02-02 NOTE — TELEPHONE ENCOUNTER
Called and spoke with pt to schedule NB. Pt's daughter makes appt's for her father. I tried to schedule but she stated that it was advised that a MRI is done first and then if needed there NB. Provided my contact information, I asked that she call me once the MRI has been schd.

## 2022-02-04 DIAGNOSIS — Z11.59 ENCOUNTER FOR SCREENING FOR OTHER VIRAL DISEASES: Primary | ICD-10-CM

## 2022-02-07 ENCOUNTER — ANCILLARY PROCEDURE (OUTPATIENT)
Dept: MRI IMAGING | Facility: CLINIC | Age: 87
End: 2022-02-07
Attending: FAMILY MEDICINE
Payer: COMMERCIAL

## 2022-02-07 DIAGNOSIS — M54.50 ACUTE BILATERAL LOW BACK PAIN WITHOUT SCIATICA: ICD-10-CM

## 2022-02-07 PROCEDURE — 72148 MRI LUMBAR SPINE W/O DYE: CPT | Mod: TC | Performed by: RADIOLOGY

## 2022-02-11 ENCOUNTER — LAB (OUTPATIENT)
Dept: LAB | Facility: CLINIC | Age: 87
End: 2022-02-11
Payer: COMMERCIAL

## 2022-02-11 DIAGNOSIS — Z11.59 ENCOUNTER FOR SCREENING FOR OTHER VIRAL DISEASES: ICD-10-CM

## 2022-02-11 PROCEDURE — U0003 INFECTIOUS AGENT DETECTION BY NUCLEIC ACID (DNA OR RNA); SEVERE ACUTE RESPIRATORY SYNDROME CORONAVIRUS 2 (SARS-COV-2) (CORONAVIRUS DISEASE [COVID-19]), AMPLIFIED PROBE TECHNIQUE, MAKING USE OF HIGH THROUGHPUT TECHNOLOGIES AS DESCRIBED BY CMS-2020-01-R: HCPCS

## 2022-02-11 PROCEDURE — U0005 INFEC AGEN DETEC AMPLI PROBE: HCPCS

## 2022-02-12 LAB — SARS-COV-2 RNA RESP QL NAA+PROBE: NEGATIVE

## 2022-02-15 ENCOUNTER — HOSPITAL ENCOUNTER (OUTPATIENT)
Facility: AMBULATORY SURGERY CENTER | Age: 87
Discharge: HOME | End: 2022-02-15
Attending: ANESTHESIOLOGY | Admitting: ANESTHESIOLOGY
Payer: COMMERCIAL

## 2022-02-15 ENCOUNTER — ANCILLARY PROCEDURE (OUTPATIENT)
Dept: RADIOLOGY | Facility: AMBULATORY SURGERY CENTER | Age: 87
End: 2022-02-15
Attending: ANESTHESIOLOGY
Payer: COMMERCIAL

## 2022-02-15 VITALS
HEIGHT: 69 IN | RESPIRATION RATE: 16 BRPM | OXYGEN SATURATION: 97 % | DIASTOLIC BLOOD PRESSURE: 80 MMHG | WEIGHT: 170 LBS | HEART RATE: 90 BPM | SYSTOLIC BLOOD PRESSURE: 151 MMHG | TEMPERATURE: 97.5 F | BODY MASS INDEX: 25.18 KG/M2

## 2022-02-15 DIAGNOSIS — M47.816 LUMBAR SPONDYLOSIS: ICD-10-CM

## 2022-02-15 DIAGNOSIS — R52 PAIN: ICD-10-CM

## 2022-02-15 PROCEDURE — 64493 INJ PARAVERT F JNT L/S 1 LEV: CPT | Mod: 50 | Performed by: ANESTHESIOLOGY

## 2022-02-15 PROCEDURE — 64494 INJ PARAVERT F JNT L/S 2 LEV: CPT | Mod: 50 | Performed by: ANESTHESIOLOGY

## 2022-02-15 PROCEDURE — 64493 INJ PARAVERT F JNT L/S 1 LEV: CPT | Mod: RT

## 2022-02-15 RX ORDER — METHYLPREDNISOLONE ACETATE 40 MG/ML
INJECTION, SUSPENSION INTRA-ARTICULAR; INTRALESIONAL; INTRAMUSCULAR; SOFT TISSUE PRN
Status: DISCONTINUED | OUTPATIENT
Start: 2022-02-15 | End: 2022-02-15 | Stop reason: HOSPADM

## 2022-02-15 RX ORDER — IOPAMIDOL 408 MG/ML
INJECTION, SOLUTION INTRAVASCULAR PRN
Status: DISCONTINUED | OUTPATIENT
Start: 2022-02-15 | End: 2022-02-15 | Stop reason: HOSPADM

## 2022-02-15 RX ORDER — BUPIVACAINE HYDROCHLORIDE 2.5 MG/ML
INJECTION, SOLUTION INFILTRATION; PERINEURAL PRN
Status: DISCONTINUED | OUTPATIENT
Start: 2022-02-15 | End: 2022-02-15 | Stop reason: HOSPADM

## 2022-02-15 ASSESSMENT — MIFFLIN-ST. JEOR: SCORE: 1401.49

## 2022-02-15 NOTE — DISCHARGE INSTRUCTIONS
PAIN INJECTION HOME CARE INSTRUCTIONS  Activity  -You may resume most normal activity levels with the exception of strenuous activity. It may help to move in ways that hurt before the injection, to see if the pain is still there, but do not overdo it. Take it easy for the rest of the day.    -DO NOT remove bandaid for 24 hours  -DO NOT shower for 24 hours      Pain  -You may feel immediate pain relief and numbness for a period of time after the injection. This may indicate the medication has reached the right spot.  -Your pain may return after this short pain-free period, or may even be a little worse for a day or two. It may be caused by needle irritation or by the medication itself. The medications usually take two or three days to start working, but can take as long as a week.    -You may use an ice pack for 20 minutes every 2 hours for the first 24 hours  -You may use a heating pad after the first 24 hours  -You may use Tylenol (acetaminophen) every 4 hours or other pain medicines as directed by your physician      DID YOU RECEIVE STEROIDS TODAY?  Yes    Common side effects of steroids:  Not everyone will experience corticosteroid side effects. If side effects are experienced, they will gradually subside in the 7-10 day period following an injection. Most common side effects include:  -Flushed face and/or chest  -Feeling of warmth, particularly in the face but could be an overall feeling of warmth  -Increased blood sugar in diabetic patients  -Menstrual irregularities my occur. If taking hormone-based birth control an alternate method of birth control is recommended  -Sleep disturbances and/or mood swings are possible  -Leg cramps    PLEASE KEEP TRACK OF YOUR SYMPTOMS AND NOTE ANY CHANGES FOR YOUR DOCTOR.       Please contact us if you have:  -Severe pain  -Fever more than 101.5 degrees Fahrenheit  -Signs of infection at the injection site (redness, swelling, or drainage)      FOR PAIN CENTER PATIENTS:  If you  have questions, please contact the Pain Clinic at [phone] Option #1 between the hours of 7:00 am and 3:00 pm Monday through Friday. After office hours you can contact the on call provider by dialing [phone]. If you need immediate attention, we recommend that you go to a hospital emergency room or dial [phone].      FOR [facility] PATIENTS:  For patients seen by the PM and R service, please call [phone]. If you need to fax a pain diary to PM&R the fax number is [phone]. If you are unable to fax, uploading to [employer] is encouraged, then send to provider. If you have procedure scheduling questions please call [phone] Option #2

## 2022-02-15 NOTE — OP NOTE
"Pain Procedure Note    Preoperative Diagnosis:  1.Lumbar spondylosis without myelopathy  2. Lumbar facet arthropathy  3. Lumbago    Postoperative Diagnosis:  1.Lumbar spondylosis without myelopathy  2. Lumbar facet arthropathy  3. Lumbago    Procedure :  1. Injection steroid of the facet joint bilateral L4/5 and L5/S1  2. Fluoroscopy for needle guidance.  3. Arthrography    Surgeon/Staff: FERNANDO Malhotra    Anesthesia: Local    Indications: The patient's history and physical findings include axial low back pain  consistant with Lumbar spondylosis without myelopathy . The patient is here for  injection of steroid into the painful facet joints for diagnosis and pain relief. The pain  has been present for greater than 3 months. The patient has failed conservative  therapy. History and physical exam are consistent with lumbar spondylosis with axial  pain. The procedure is designed to be diagnostic and therapeutic.    Procedure in detail: Written informed consent was obtained. The chart was reviewed,  questions were answered and the patient wished to proceed. The patient had no  contraindications to the procedure. Vital signs were stable. Standard monitoring was  applied. The patient, the procedure nurse, and the physician confirmed the site of  injection after an official \"time out.\" The skin over the injection site was also marked  and confirmed as the site of the injection.    Localization Time Out: An additional intraoperative timeout, specifically to confirm accurate localization, was conducted by the attending physician. The patient remained awake and alert throughout the procedure. The patient was placed in the prone position. The skin was prepped with chlorhexidine and sterile drapes were applied. The skin and soft tissues were anesthetized with Lidocaine 1% at each site. At each level a 22 gauge 3.5 inch needle was inserted percutaneously and advanced under fluoroscopic guidance using dorsal, lateral, and " oblique projections to its radiographic target. For each of the lumbar levels, the tip of the needle was placed into the facet joint. No paraesthesias occurred and aspiration was negative. Next, isovue contrast was given and contrast spread was noted to outline the corresponding joint capsule. The facet joints were injected with a solution of methylprednisolone 40mg/mL mixed with 0.25% bupivacaine equally divided between the injection sites. The needles were removed after flushing with lidocaine 1%. Sterile bandages were applied. The patient did not experience any hemodynamic or neurologic sequelae. The patient was transferred to the recovery area. Post operative instructions were explained and given to the patient. The patient was discharged in stable health and given follow up instructions.

## 2022-02-15 NOTE — H&P
Bambi Feldman  [id number]  male  89 year old      Reason for procedure/surgery: lumbar spondylosis    Patient Active Problem List   Diagnosis     Essential hypertension     Other malignant neoplasm of skin, site unspecified     Osteoarthritis     Other and unspecified hyperlipidemia     Low back pain     Primary osteoarthritis of both knees     Trochanteric bursitis of right hip     Chronic kidney disease, stage 3 (moderate)     Anxiety     Basal cell carcinoma of skin     Benign prostatic hyperplasia     Dyssomnia     High prostate specific antigen (PSA)     Rosacea     Squamous cell carcinoma in situ (SCCIS) of skin     Lumbar spondylosis       Past Surgical History:    Past Surgical History:   Procedure Laterality Date     SUPRAPUBIC PROSTATECTOMY       SURGICAL HISTORY OF -       skin cancer right neck and lip       Past Medical History:   Past Medical History:   Diagnosis Date     BPH (benign prostatic hyperplasia)      Elevated cholesterol      HTN (hypertension)      Other malignant neoplasm of skin, site unspecified     Non-melanoma skin cancer       Social History:   Social History     Tobacco Use     Smoking status: Former Smoker     Quit date: 1970     Years since quittin.1     Smokeless tobacco: Never Used   Substance Use Topics     Alcohol use: Yes     Comment: 1 drink per night        Family History:   Family History   Problem Relation Age of Onset     Alzheimer Disease Sister      C.A.D. No family hx of      Diabetes No family hx of      Hypertension No family hx of      Cerebrovascular Disease No family hx of      Breast Cancer No family hx of      Cancer - colorectal No family hx of      Prostate Cancer No family hx of        Allergies:   Allergies   Allergen Reactions     Nkda [No Known Drug Allergies]      Simvastatin Muscle Pain (Myalgia)       Active Medications:   Current Outpatient Medications   Medication Sig Dispense Refill     acetaminophen (TYLENOL) 650 MG CR tablet Take 2 tablets  (1,300 mg) by mouth every 8 hours as needed for mild pain or fever 360 tablet 3     cholecalciferol (VITAMIN D3) 56008 UNIT capsule Take 1 capsule by mouth daily.       co-enzyme Q-10 (COQ10) 100 MG CAPS Take by mouth daily 200mg       cyanocolbalamin (VITAMIN  B-12) 500 MCG tablet Take 500 mcg by mouth daily 2500 mg       diclofenac (VOLTAREN) 1 % topical gel Apply 2 gram to knees qid as needed 100 g 3     Flaxseed, Linseed, (FLAXSEED OIL) 1200 MG CAPS        gabapentin (NEURONTIN) 300 MG capsule Take 1 capsule (300 mg) by mouth At Bedtime 30 capsule 3     Garlic 2000 MG CAPS Take 500 mg by mouth 500mg       Ginseng 250 MG CAPS        glucosamine 500 MG CAPS capsule Take 500 mg by mouth daily       Ibuprofen-diphenhydrAMINE Cit (ADVIL PM) 200-38 MG TABS Take 1 tablet by mouth nightly as needed       lisinopril (ZESTRIL) 10 MG tablet Take 1 tablet (10 mg) by mouth daily 90 tablet 1     Melatonin 10 MG TABS tablet Take 10 mg by mouth nightly as needed for sleep       mirtazapine (REMERON) 15 MG tablet One tab at bedtime as needed 90 tablet 1     Multiple Vitamins-Minerals (ENERGY BOOSTER PO)        NEW MED Beat root  1000mg       Omega-3 Fatty Acids (FISH OIL) 1200 MG capsule Take 1,200 mg by mouth daily       polyethylene glycol (MIRALAX) 17 GM/Dose powder Take 17 g (1 capful) by mouth daily 507 g 11     Probiotic Product (ADVANCED PROBIOTIC PO)        Psyllium (METAMUCIL FIBER PO)        traMADol (ULTRAM) 50 MG tablet One tab daily as needed 30 tablet 0     Turmeric 500 MG CAPS Take by mouth daily       UNABLE TO FIND daily MEDICATION NAME: Super Vitamin B         Systemic Review:   CONSTITUTIONAL: NEGATIVE for fever, chills, change in weight  ENT/MOUTH: NEGATIVE for ear, mouth and throat problems  RESP: NEGATIVE for significant cough or SOB  CV: NEGATIVE for chest pain, palpitations or peripheral edema    Physical Examination:   Vital Signs: BP (!) 151/80   Pulse 90   Temp 97.5  F (36.4  C) (Temporal)   Resp 16  "  Ht 1.753 m (5' 9\")   Wt 77.1 kg (170 lb)   SpO2 97%   BMI 25.10 kg/m    GENERAL: healthy, alert and no distress  NECK: no adenopathy, no asymmetry, masses, or scars  RESP: lungs clear to auscultation - no rales, rhonchi or wheezes  CV: regular rate and rhythm, normal S1 S2, no S3 or S4, no murmur, click or rub, no peripheral edema and peripheral pulses strong  ABDOMEN: soft, nontender, no hepatosplenomegaly, no masses and bowel sounds normal  MS: no gross musculoskeletal defects noted, no edema    Plan: Appropriate to proceed as scheduled.      Kaylee Stephenson MD  2/15/2022          "

## 2022-02-17 PROBLEM — N18.30 STAGE 3 CHRONIC KIDNEY DISEASE (H): Status: ACTIVE | Noted: 2018-02-25

## 2022-03-28 DIAGNOSIS — I10 HYPERTENSION GOAL BP (BLOOD PRESSURE) < 140/90: ICD-10-CM

## 2022-03-28 RX ORDER — LISINOPRIL 10 MG/1
TABLET ORAL
Qty: 90 TABLET | Refills: 1 | Status: SHIPPED | OUTPATIENT
Start: 2022-03-28 | End: 2022-10-31

## 2022-03-28 NOTE — TELEPHONE ENCOUNTER
Routing refill request to provider for review/approval because:  BP    BP Readings from Last 3 Encounters:   02/15/22 (!) 151/80   01/28/22 134/74   11/02/21 130/70              Pending Prescriptions:                       Disp   Refills    lisinopril (ZESTRIL) 10 MG tablet [Pharmac*90 tab*0        Sig: TAKE ONE TABLET BY MOUTH ONE TIME DAILY        Lj Mueller RN

## 2022-09-26 ENCOUNTER — OFFICE VISIT (OUTPATIENT)
Dept: FAMILY MEDICINE | Facility: CLINIC | Age: 87
End: 2022-09-26
Payer: COMMERCIAL

## 2022-09-26 VITALS
BODY MASS INDEX: 27.12 KG/M2 | TEMPERATURE: 98.1 F | SYSTOLIC BLOOD PRESSURE: 140 MMHG | OXYGEN SATURATION: 98 % | RESPIRATION RATE: 20 BRPM | HEIGHT: 67 IN | DIASTOLIC BLOOD PRESSURE: 76 MMHG | WEIGHT: 172.8 LBS | HEART RATE: 82 BPM

## 2022-09-26 DIAGNOSIS — F32.1 CURRENT MODERATE EPISODE OF MAJOR DEPRESSIVE DISORDER, UNSPECIFIED WHETHER RECURRENT (H): ICD-10-CM

## 2022-09-26 DIAGNOSIS — F41.9 ANXIETY: ICD-10-CM

## 2022-09-26 DIAGNOSIS — N18.30 STAGE 3 CHRONIC KIDNEY DISEASE, UNSPECIFIED WHETHER STAGE 3A OR 3B CKD (H): ICD-10-CM

## 2022-09-26 DIAGNOSIS — M17.12 PRIMARY OSTEOARTHRITIS OF LEFT KNEE: ICD-10-CM

## 2022-09-26 DIAGNOSIS — I10 PRIMARY HYPERTENSION: ICD-10-CM

## 2022-09-26 DIAGNOSIS — Z13.220 SCREENING FOR HYPERLIPIDEMIA: ICD-10-CM

## 2022-09-26 DIAGNOSIS — M17.11 PRIMARY OSTEOARTHRITIS OF RIGHT KNEE: ICD-10-CM

## 2022-09-26 DIAGNOSIS — Z00.00 ENCOUNTER FOR MEDICARE ANNUAL WELLNESS EXAM: Primary | ICD-10-CM

## 2022-09-26 LAB — HGB BLD-MCNC: 14 G/DL (ref 13.3–17.7)

## 2022-09-26 PROCEDURE — G0439 PPPS, SUBSEQ VISIT: HCPCS | Performed by: FAMILY MEDICINE

## 2022-09-26 PROCEDURE — 80048 BASIC METABOLIC PNL TOTAL CA: CPT | Performed by: FAMILY MEDICINE

## 2022-09-26 PROCEDURE — 85018 HEMOGLOBIN: CPT | Performed by: FAMILY MEDICINE

## 2022-09-26 PROCEDURE — 82043 UR ALBUMIN QUANTITATIVE: CPT | Performed by: FAMILY MEDICINE

## 2022-09-26 PROCEDURE — 20610 DRAIN/INJ JOINT/BURSA W/O US: CPT | Mod: 50 | Performed by: FAMILY MEDICINE

## 2022-09-26 PROCEDURE — 36415 COLL VENOUS BLD VENIPUNCTURE: CPT | Performed by: FAMILY MEDICINE

## 2022-09-26 RX ORDER — MIRTAZAPINE 30 MG/1
30 TABLET, FILM COATED ORAL AT BEDTIME
Qty: 30 TABLET | Refills: 1 | Status: SHIPPED | OUTPATIENT
Start: 2022-09-26 | End: 2023-04-24

## 2022-09-26 RX ORDER — MULTIVIT WITH MINERALS/LUTEIN
250 TABLET ORAL DAILY
COMMUNITY

## 2022-09-26 RX ORDER — TRIAMCINOLONE ACETONIDE 40 MG/ML
40 INJECTION, SUSPENSION INTRA-ARTICULAR; INTRAMUSCULAR ONCE
Status: COMPLETED | OUTPATIENT
Start: 2022-09-26 | End: 2022-10-10

## 2022-09-26 RX ORDER — TRIAMCINOLONE ACETONIDE 40 MG/ML
40 INJECTION, SUSPENSION INTRA-ARTICULAR; INTRAMUSCULAR ONCE
Status: ACTIVE | OUTPATIENT
Start: 2022-09-26

## 2022-09-26 ASSESSMENT — ANXIETY QUESTIONNAIRES
GAD7 TOTAL SCORE: 3
IF YOU CHECKED OFF ANY PROBLEMS ON THIS QUESTIONNAIRE, HOW DIFFICULT HAVE THESE PROBLEMS MADE IT FOR YOU TO DO YOUR WORK, TAKE CARE OF THINGS AT HOME, OR GET ALONG WITH OTHER PEOPLE: NOT DIFFICULT AT ALL
2. NOT BEING ABLE TO STOP OR CONTROL WORRYING: NOT AT ALL
1. FEELING NERVOUS, ANXIOUS, OR ON EDGE: SEVERAL DAYS
7. FEELING AFRAID AS IF SOMETHING AWFUL MIGHT HAPPEN: NOT AT ALL
GAD7 TOTAL SCORE: 3
5. BEING SO RESTLESS THAT IT IS HARD TO SIT STILL: NOT AT ALL
3. WORRYING TOO MUCH ABOUT DIFFERENT THINGS: NOT AT ALL
6. BECOMING EASILY ANNOYED OR IRRITABLE: MORE THAN HALF THE DAYS

## 2022-09-26 ASSESSMENT — PATIENT HEALTH QUESTIONNAIRE - PHQ9
5. POOR APPETITE OR OVEREATING: NOT AT ALL
SUM OF ALL RESPONSES TO PHQ QUESTIONS 1-9: 12

## 2022-09-26 ASSESSMENT — PAIN SCALES - GENERAL: PAINLEVEL: EXTREME PAIN (8)

## 2022-09-26 NOTE — PROGRESS NOTES
"  SUBJECTIVE:   Bambi Feldman is a 89 year old male who presents for Preventive Visit.    Patient has been advised of split billing requirements and indicates understanding: Yes  Are you in the first 12 months of your Medicare Part B coverage?  No    Physical Health:    In general, how would you rate your overall physical health? fair    Outside of work, how many days during the week do you exercise? 2-3 days/week    Outside of work, approximately how many minutes a day do you exercise?45-60 minutes    If you drink alcohol do you typically have >3 drinks per day or >7 drinks per week? No    Do you usually eat at least 4 servings of fruit and vegetables a day, include whole grains & fiber and avoid regularly eating high fat or \"junk\" foods? Yes    Do you have any problems taking medications regularly?  No    Do you have any side effects from medications? none    Needs assistance for the following daily activities: no assistance needed    Which of the following safety concerns are present in your home?  none identified     Hearing impairment: Yes, Difficulty following a conversation in a noisy restaurant or crowded room.    In the past 6 months, have you been bothered by leaking of urine? no    Mental Health:    In general, how would you rate your overall mental or emotional health? fair  PHQ-2 Score:      Do you feel safe in your environment? YES    Have you ever done Advance Care Planning? (For example, a Health Directive, POLST, or a discussion with a medical provider or your loved ones about your wishes): No, advance care planning information given to patient to review.  Patient declined advance care planning discussion at this time.    Additional concerns to address?  YES- follow up on hip and knee pain and wondering what else he can take or use.  He had a bilateral L4-L5 and L5-S1 facet joint injection February 2022.    He had an x-ray that showed moderate right hip arthritis in 2019 and an x-ray that showed " moderate to severe knee arthritis bilaterally in 2017    - some mood changes.  He is on mirteazapam 15 mg daily at night.      Fall risk:  Fallen 2 or more times in the past year?: No  Any fall with injury in the past year?: No    Cognitive Screenin) Repeat 3 items (Leader, Season, Table)    2) Clock draw: NORMAL  3) 3 item recall: Recalls 2 objects   Results: NORMAL clock, 1-2 items recalled: COGNITIVE IMPAIRMENT LESS LIKELY    Mini-CogTM Copyright TAMIKA Howell. Licensed by the author for use in [employer]; reprinted with permission (diamante@[email or web address]). All rights reserved.          Hyperlipidemia Follow-Up      Are you regularly taking any medication or supplement to lower your cholesterol?   No    Are you having muscle aches or other side effects that you think could be caused by your cholesterol lowering medication?  No   LDL Cholesterol Calculated   Date Value Ref Range Status   10/17/2019 153 (H) <100 mg/dL Final     Comment:     Above desirable:  100-129 mg/dl  Borderline High:  130-159 mg/dL  High:             160-189 mg/dL  Very high:       >189 mg/dl             Hypertension Follow-up      Do you check your blood pressure regularly outside of the clinic? No     Are you following a low salt diet? Yes    Are your blood pressures ever more than 140 on the top number (systolic) OR more   than 90 on the bottom number (diastolic), for example 140/90? Yes   Lisinopril 10 mg daily    Chronic Kidney Disease Follow-up      Do you take any over the counter pain medicine?: No   Creatinine   Date Value Ref Range Status   2021 1.17 0.66 - 1.25 mg/dL Final   09/15/2020 1.33 (H) 0.66 - 1.25 mg/dL Final       Reviewed and updated as needed this visit by clinical staff   Tobacco  Allergies  Meds   Med Hx  Surg Hx  Fam Hx  Soc Hx          Reviewed and updated as needed this visit by Provider                   Social History     Tobacco Use     Smoking status: Former Smoker     Quit date: 1970      Years since quittin.7     Smokeless tobacco: Never Used   Substance Use Topics     Alcohol use: Yes     Comment: 1 drink per night                            Current providers sharing in care for this patient include:   Patient Care Team:  Chandrika Cordova DO as PCP - General (Family Practice)  Mikhail Murrell MD as Assigned Musculoskeletal Provider  Chandrika Cordova DO as Assigned PCP    The following health maintenance items are reviewed in Epic and correct as of today:  Health Maintenance   Topic Date Due     MICROALBUMIN  Never done     URINALYSIS  Never done     ZOSTER IMMUNIZATION (2 of 3) 2009     HEMOGLOBIN  2018     LIPID  10/17/2020     BMP  2022     MEDICARE ANNUAL WELLNESS VISIT  2023     ANNUAL REVIEW OF HM ORDERS  2023     FALL RISK ASSESSMENT  2023     ADVANCE CARE PLANNING  2027     DTAP/TDAP/TD IMMUNIZATION (4 - Td or Tdap) 10/30/2028     PHQ-2 (once per calendar year)  Completed     INFLUENZA VACCINE  Completed     Pneumococcal Vaccine: 65+ Years  Completed     COVID-19 Vaccine  Completed     IPV IMMUNIZATION  Aged Out     MENINGITIS IMMUNIZATION  Aged Out     HEPATITIS B IMMUNIZATION  Aged Out     BP Readings from Last 3 Encounters:   22 (!) 140/76   02/15/22 (!) 151/80   22 134/74    Wt Readings from Last 3 Encounters:   22 78.4 kg (172 lb 12.8 oz)   02/15/22 77.1 kg (170 lb)   22 78.5 kg (173 lb)                  Pneumonia Vaccine:For adults 65 years or older who do not have an immunocompromising condition, cerebrospinal fluid leak, or cochlear implant and want to receive PPSV23 ONLY: Administer 1 dose of PPSV23. Anyone who received any doses of PPSV23 before age 65 should receive 1 final dose of the vaccine at age 65 or older. Administer this last dose at least 5 years after the prior PPSV23 dose.    ROS:  Constitutional, HEENT, cardiovascular, pulmonary, GI, , musculoskeletal, neuro, skin, endocrine and psych systems are  "negative, except as otherwise noted.    OBJECTIVE:   BP (!) 140/76   Pulse 82   Temp 98.1  F (36.7  C) (Oral)   Resp 20   Ht 1.702 m (5' 7\")   Wt 78.4 kg (172 lb 12.8 oz)   SpO2 98%   BMI 27.06 kg/m   Estimated body mass index is 27.06 kg/m  as calculated from the following:    Height as of this encounter: 1.702 m (5' 7\").    Weight as of this encounter: 78.4 kg (172 lb 12.8 oz).  EXAM:   GENERAL: healthy, alert and no distress  EYES: Eyes grossly normal to inspection, PERRL and conjunctivae and sclerae normal  HENT: ear canals and TM's normal, nose and mouth without ulcers or lesions  NECK: no adenopathy, no asymmetry, masses, or scars and thyroid normal to palpation  RESP: lungs clear to auscultation - no rales, rhonchi or wheezes  CV: regular rate and rhythm, normal S1 S2, no S3 or S4, no murmur, click or rub, no peripheral edema and peripheral pulses strong  ABDOMEN: soft, nontender, no hepatosplenomegaly, no masses and bowel sounds normal  MS: arthritis of the knees and hips bilaterally   SKIN: no suspicious lesions or rashes  NEURO: Normal strength and tone, mentation intact and speech normal  PSYCH: mentation appears normal, affect normal/bright      Procedure:  Aspiration/Intracortico-steroid injection left knee     Written consent    Sterile technique, patient cleansed with alcohol     Lateral approach  0 fluid removed    1 cc Kenalog 40 mg and 4 cc 1% Lidocaine injected into the joint space lot number     A band-aid was placed over the injection site    The patient tolerated the procedure well      Procedure:  Aspiration/Intracortico-steroid injection right knee     Written consent    Sterile technique, patient cleansed with alcohol     Lateral approach  0 fluid removed    1 cc Kenalog 40 mg and 4 cc 1% Lidocaine injected into the joint space lot number     A band-aid was placed over the injection site    The patient tolerated the procedure well        Diagnostic Test Results:  Labs reviewed in " "Epic  No results found for this or any previous visit (from the past 24 hour(s)).    ASSESSMENT / PLAN:   (Z00.00) Encounter for Medicare annual wellness exam  (primary encounter diagnosis)  Comment:   Plan:     (Z13.220) Screening for hyperlipidemia  Comment:   Plan:     (M17.12) Primary osteoarthritis of left knee  Comment: tyenol and cbd cream also  Plan: triamcinolone (KENALOG-40) injection 40 mg,         DRAIN/INJECT LARGE JOINT/BURSA            (M17.11) Primary osteoarthritis of right knee  Comment: tyelnol   Plan: triamcinolone (KENALOG-40) injection 40 mg,         DRAIN/INJECT LARGE JOINT/BURSA            (F41.9) Anxiety  Comment: increased remeron from 15 mg to 30 mg daily   Plan: mirtazapine (REMERON) 30 MG tablet        Recheck in 4 weeks     (F32.1) Current moderate episode of major depressive disorder, unspecified whether recurrent (H)  Comment: increased   Plan: mirtazapine (REMERON) 30 MG tablet            (I10) Primary hypertension  Comment: will recheck in a month   Plan: BASIC METABOLIC PANEL            (N18.30) Stage 3 chronic kidney disease, unspecified whether stage 3a or 3b CKD (H)  Comment: monitor with labs today   Plan: Albumin Random Urine Quantitative with Creat         Ratio, Hemoglobin              COUNSELING:  Reviewed preventive health counseling, as reflected in patient instructions       Regular exercise       Healthy diet/nutrition    Estimated body mass index is 27.06 kg/m  as calculated from the following:    Height as of this encounter: 1.702 m (5' 7\").    Weight as of this encounter: 78.4 kg (172 lb 12.8 oz).        He reports that he quit smoking about 52 years ago. He has never used smokeless tobacco.    Appropriate preventive services were discussed with this patient, including applicable screening as appropriate for cardiovascular disease, diabetes, osteopenia/osteoporosis, and glaucoma.  As appropriate for age/gender, discussed screening for colorectal cancer, prostate " cancer, breast cancer, and cervical cancer. Checklist reviewing preventive services available has been given to the patient.    Reviewed patients plan of care and provided an AVS. The Basic Care Plan (routine screening as documented in Health Maintenance) for Bambi meets the Care Plan requirement. This Care Plan has been established and reviewed with the Patient and daughter.    Counseling Resources:  ATP IV Guidelines  Pooled Cohorts Equation Calculator  Breast Cancer Risk Calculator  BRCA-Related Cancer Risk Assessment: FHS-7 Tool  FRAX Risk Assessment  ICSI Preventive Guidelines  Dietary Guidelines for Americans, 2010  USDA's MyPlate  ASA Prophylaxis  Lung CA Screening    DO TIERRA [facility]

## 2022-09-26 NOTE — PATIENT INSTRUCTIONS
Tylenol 500 mg two tabs 3 times a day as needed    Take every night before bed           Patient Education   Personalized Prevention Plan  You are due for the preventive services outlined below.  Your care team is available to assist you in scheduling these services.  If you have already completed any of these items, please share that information with your care team to update in your medical record.  Health Maintenance Due   Topic Date Due    Kidney Microalbumin Urine Test  Never done    Urine Test  Never done    Zoster (Shingles) Vaccine (2 of 3) 05/12/2009    Hemoglobin  08/03/2018    Cholesterol Lab  10/17/2020    Basic Metabolic Panel  09/17/2022    ANNUAL REVIEW OF HM ORDERS  09/17/2022    FALL RISK ASSESSMENT  09/17/2022    Annual Wellness Visit  09/17/2022     Preventive Health Recommendations  See your health care provider every year to  Review health changes.   Discuss preventive care.    Review your medicines if your doctor has prescribed any.  Talk with your health care provider about whether you should have a test to screen for prostate cancer (PSA).  Every 3 years, have a diabetes test (fasting glucose). If you are at risk for diabetes, you should have this test more often.  Every 5 years, have a cholesterol test. Have this test more often if you are at risk for high cholesterol or heart disease.   Every 10 years, have a colonoscopy. Or, have a yearly FIT test (stool test). These exams will check for colon cancer.  Talk to with your health care provider about screening for Abdominal Aortic Aneurysm if you have a family history of AAA or have a history of smoking.    Shots:   Get a flu shot each year.   Get a tetanus shot every 10 years.   Talk to your doctor about your pneumonia vaccines. There are now two you should receive - Pneumovax (PPSV 23) and Prevnar (PCV 13).  Talk to your pharmacist about a shingles vaccine.   Talk to your doctor about the hepatitis B vaccine.    Nutrition:   Eat at least 5  servings of fruits and vegetables each day.   Eat whole-grain bread, whole-wheat pasta and brown rice instead of white grains and rice.   Get adequate Calcium and Vitamin D.     Lifestyle  Exercise for at least 150 minutes a week (30 minutes a day, 5 days a week). This will help you control your weight and prevent disease.   Limit alcohol to one drink per day.   No smoking.   Wear sunscreen to prevent skin cancer.   See your dentist every six months for an exam and cleaning.   See your eye doctor every 1 to 2 years to screen for conditions such as glaucoma, macular degeneration and cataracts.    Personalized Prevention Plan  You are due for the preventive services outlined below.  Your care team is available to assist you in scheduling these services.  If you have already completed any of these items, please share that information with your care team to update in your medical record.  Health Maintenance   Topic Date Due    MICROALBUMIN  Never done    URINALYSIS  Never done    ZOSTER IMMUNIZATION (2 of 3) 05/12/2009    HEMOGLOBIN  08/03/2018    LIPID  10/17/2020    BMP  09/17/2022    ANNUAL REVIEW OF HM ORDERS  09/17/2022    FALL RISK ASSESSMENT  09/17/2022    MEDICARE ANNUAL WELLNESS VISIT  09/17/2022    ADVANCE CARE PLANNING  09/17/2026    DTAP/TDAP/TD IMMUNIZATION (4 - Td or Tdap) 10/30/2028    PHQ-2 (once per calendar year)  Completed    INFLUENZA VACCINE  Completed    Pneumococcal Vaccine: 65+ Years  Completed    COVID-19 Vaccine  Completed    IPV IMMUNIZATION  Aged Out    MENINGITIS IMMUNIZATION  Aged Out    HEPATITIS B IMMUNIZATION  Aged Out

## 2022-09-27 LAB
ANION GAP SERPL CALCULATED.3IONS-SCNC: 9 MMOL/L (ref 3–14)
BUN SERPL-MCNC: 22 MG/DL (ref 7–30)
CALCIUM SERPL-MCNC: 9 MG/DL (ref 8.5–10.1)
CHLORIDE BLD-SCNC: 98 MMOL/L (ref 94–109)
CO2 SERPL-SCNC: 24 MMOL/L (ref 20–32)
CREAT SERPL-MCNC: 1.3 MG/DL (ref 0.66–1.25)
CREAT UR-MCNC: 38 MG/DL
GFR SERPL CREATININE-BSD FRML MDRD: 51 ML/MIN/1.73M2
GLUCOSE BLD-MCNC: 96 MG/DL (ref 70–99)
MICROALBUMIN UR-MCNC: <5 MG/L
MICROALBUMIN/CREAT UR: NORMAL MG/G{CREAT}
POTASSIUM BLD-SCNC: 4.8 MMOL/L (ref 3.4–5.3)
SODIUM SERPL-SCNC: 131 MMOL/L (ref 133–144)

## 2022-09-28 ENCOUNTER — TELEPHONE (OUTPATIENT)
Dept: FAMILY MEDICINE | Facility: CLINIC | Age: 87
End: 2022-09-28

## 2022-09-28 DIAGNOSIS — G47.09 OTHER INSOMNIA: ICD-10-CM

## 2022-09-28 RX ORDER — MIRTAZAPINE 15 MG/1
TABLET, FILM COATED ORAL
Qty: 90 TABLET | Refills: 0 | Status: SHIPPED | OUTPATIENT
Start: 2022-09-28 | End: 2023-01-27

## 2022-09-28 NOTE — TELEPHONE ENCOUNTER
Routing to PCP    Give 30 mg more time?    RN did pend 15 mg if agreeable    Patient requesting to back to 15 mg of Mirtazapine.    Per patients wife, patient is having increased difficulty sleeping with increase.    Last office visit 9/26    F41.9) Anxiety  Comment: increased remeron from 15 mg to 30 mg daily   Plan: mirtazapine (REMERON) 30 MG tablet        Recheck in 4 weeks     RN received call from patients wife regarding above.    No consent to communicate on file.  RN did not share PHI.    Arturo Gray, RN, BSN, PHN  [facility]

## 2022-09-29 NOTE — TELEPHONE ENCOUNTER
RN called patient/family and relayed provider's message. Patient gave verbal OK to speak with daughter. Patient/family verbalized understanding.     Vandana Brown RN, BSN, PHN  [facility] [place]

## 2022-10-10 RX ADMIN — TRIAMCINOLONE ACETONIDE 40 MG: 40 INJECTION, SUSPENSION INTRA-ARTICULAR; INTRAMUSCULAR at 15:46

## 2022-10-28 DIAGNOSIS — I10 HYPERTENSION GOAL BP (BLOOD PRESSURE) < 140/90: ICD-10-CM

## 2022-10-31 RX ORDER — LISINOPRIL 10 MG/1
TABLET ORAL
Qty: 90 TABLET | Refills: 1 | Status: SHIPPED | OUTPATIENT
Start: 2022-10-31 | End: 2023-04-24

## 2023-04-24 ENCOUNTER — OFFICE VISIT (OUTPATIENT)
Dept: FAMILY MEDICINE | Facility: CLINIC | Age: 88
End: 2023-04-24
Payer: COMMERCIAL

## 2023-04-24 VITALS
TEMPERATURE: 97.5 F | DIASTOLIC BLOOD PRESSURE: 76 MMHG | BODY MASS INDEX: 27.94 KG/M2 | WEIGHT: 178 LBS | HEART RATE: 91 BPM | RESPIRATION RATE: 16 BRPM | HEIGHT: 67 IN | OXYGEN SATURATION: 98 % | SYSTOLIC BLOOD PRESSURE: 138 MMHG

## 2023-04-24 DIAGNOSIS — F32.1 CURRENT MODERATE EPISODE OF MAJOR DEPRESSIVE DISORDER, UNSPECIFIED WHETHER RECURRENT (H): ICD-10-CM

## 2023-04-24 DIAGNOSIS — I10 HYPERTENSION GOAL BP (BLOOD PRESSURE) < 140/90: ICD-10-CM

## 2023-04-24 DIAGNOSIS — G47.09 OTHER INSOMNIA: ICD-10-CM

## 2023-04-24 DIAGNOSIS — M10.071 ACUTE IDIOPATHIC GOUT INVOLVING TOE OF RIGHT FOOT: Primary | ICD-10-CM

## 2023-04-24 DIAGNOSIS — Z23 NEED FOR SHINGLES VACCINE: ICD-10-CM

## 2023-04-24 DIAGNOSIS — R01.1 NEWLY RECOGNIZED MURMUR: ICD-10-CM

## 2023-04-24 DIAGNOSIS — N18.30 STAGE 3 CHRONIC KIDNEY DISEASE, UNSPECIFIED WHETHER STAGE 3A OR 3B CKD (H): ICD-10-CM

## 2023-04-24 LAB
ANION GAP SERPL CALCULATED.3IONS-SCNC: 13 MMOL/L (ref 7–15)
BUN SERPL-MCNC: 14.8 MG/DL (ref 8–23)
CALCIUM SERPL-MCNC: 9.4 MG/DL (ref 8.2–9.6)
CHLORIDE SERPL-SCNC: 98 MMOL/L (ref 98–107)
CREAT SERPL-MCNC: 1.38 MG/DL (ref 0.67–1.17)
DEPRECATED HCO3 PLAS-SCNC: 21 MMOL/L (ref 22–29)
GFR SERPL CREATININE-BSD FRML MDRD: 47 ML/MIN/1.73M2
GLUCOSE SERPL-MCNC: 99 MG/DL (ref 70–99)
POTASSIUM SERPL-SCNC: 4.7 MMOL/L (ref 3.4–5.3)
SODIUM SERPL-SCNC: 132 MMOL/L (ref 136–145)
URATE SERPL-MCNC: 7.3 MG/DL (ref 3.4–7)

## 2023-04-24 PROCEDURE — 99214 OFFICE O/P EST MOD 30 MIN: CPT | Performed by: FAMILY MEDICINE

## 2023-04-24 PROCEDURE — 36415 COLL VENOUS BLD VENIPUNCTURE: CPT | Performed by: FAMILY MEDICINE

## 2023-04-24 PROCEDURE — 84550 ASSAY OF BLOOD/URIC ACID: CPT | Performed by: FAMILY MEDICINE

## 2023-04-24 PROCEDURE — 80048 BASIC METABOLIC PNL TOTAL CA: CPT | Performed by: FAMILY MEDICINE

## 2023-04-24 RX ORDER — PREDNISONE 20 MG/1
TABLET ORAL
Qty: 12 TABLET | Refills: 0 | Status: SHIPPED | OUTPATIENT
Start: 2023-04-24

## 2023-04-24 RX ORDER — MIRTAZAPINE 15 MG/1
TABLET, FILM COATED ORAL
Qty: 90 TABLET | Refills: 1 | Status: SHIPPED | OUTPATIENT
Start: 2023-04-24 | End: 2024-01-03

## 2023-04-24 RX ORDER — LISINOPRIL 10 MG/1
10 TABLET ORAL DAILY
Qty: 90 TABLET | Refills: 1 | Status: SHIPPED | OUTPATIENT
Start: 2023-04-24 | End: 2023-11-07

## 2023-04-24 ASSESSMENT — ANXIETY QUESTIONNAIRES
2. NOT BEING ABLE TO STOP OR CONTROL WORRYING: NOT AT ALL
GAD7 TOTAL SCORE: 0
5. BEING SO RESTLESS THAT IT IS HARD TO SIT STILL: NOT AT ALL
1. FEELING NERVOUS, ANXIOUS, OR ON EDGE: NOT AT ALL
GAD7 TOTAL SCORE: 0
6. BECOMING EASILY ANNOYED OR IRRITABLE: NOT AT ALL
3. WORRYING TOO MUCH ABOUT DIFFERENT THINGS: NOT AT ALL
IF YOU CHECKED OFF ANY PROBLEMS ON THIS QUESTIONNAIRE, HOW DIFFICULT HAVE THESE PROBLEMS MADE IT FOR YOU TO DO YOUR WORK, TAKE CARE OF THINGS AT HOME, OR GET ALONG WITH OTHER PEOPLE: NOT DIFFICULT AT ALL
7. FEELING AFRAID AS IF SOMETHING AWFUL MIGHT HAPPEN: NOT AT ALL

## 2023-04-24 ASSESSMENT — PATIENT HEALTH QUESTIONNAIRE - PHQ9
5. POOR APPETITE OR OVEREATING: NOT AT ALL
SUM OF ALL RESPONSES TO PHQ QUESTIONS 1-9: 6

## 2023-04-24 ASSESSMENT — PAIN SCALES - GENERAL: PAINLEVEL: EXTREME PAIN (8)

## 2023-04-24 NOTE — PROGRESS NOTES
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  He denies any trauma, fever or other signs of illness.  We will treat with prednisone taper.  I chosen prednisone in order to avoid NSAIDs because of his CKD.  Wanted to avoid colchicine because of his advanced age.    - Uric acid; Future  - Uric acid  - predniSONE (DELTASONE) 20 MG tablet; 2 tabs daily x 3 days, then 1 tab daily x 3 days, then 1/2 tab daily x 3 days.    Newly recognized murmur  Patient was directed to get echo to determine the cause of this new heart murmur    - Echocardiogram Complete; Future    Hypertension goal BP (blood pressure) < 140/90  The current medical regimen is effective;  continue present plan and medications.    - lisinopril (ZESTRIL) 10 MG tablet; Take 1 tablet (10 mg) by mouth daily  - Basic metabolic panel  (Ca, Cl, CO2, Creat, Gluc, K, Na, BUN); Future  - Basic metabolic panel  (Ca, Cl, CO2, Creat, Gluc, K, Na, BUN)    Stage 3 chronic kidney disease, unspecified whether stage 3a or 3b CKD (H)  Monitor with labs today  - Basic metabolic panel  (Ca, Cl, CO2, Creat, Gluc, K, Na, BUN); Future  - Basic metabolic panel  (Ca, Cl, CO2, Creat, Gluc, K, Na, BUN)    Other insomnia    - mirtazapine (REMERON) 15 MG tablet; TAKE ONE TABLET BY MOUTH EVERY NIGHT AT BEDTIME AS NEEDED    Current moderate episode of major depressive disorder, unspecified whether recurrent (H)  Stable on Remeron    Need for shingles vaccine  Patient will get this 2 weeks after he has completed his steroids      30 minutes spent by me on the date of the encounter doing chart review, history and exam, documentation and further activities per the note       BMI:   Estimated body mass index is 27.88 kg/m  as calculated from the following:    Height as of this encounter: 1.702 m (5' 7\").    Weight as of this encounter: 80.7 kg (178 lb).         Chandrika Cordova DO  [facility]" "ERMA Lacy is a 89 year old, presenting for the following health issues:  Arthritis (Gout )        4/24/2023     9:56 AM   Additional Questions   Roomed by Christi         4/24/2023     9:56 AM   Patient Reported Additional Medications   Patient reports taking the following new medications Mupirocin Ointment 2%- twice daily     HPI     Gout/ Single Inflamed Joint  Onset/Duration: 1-2 weeks   Description:   Location:  Right foot- big toe   Joint Swelling: YES  Redness: YES  Pain: YES  Intensity: moderate  Progression of Symptoms: worsening  Accompanying Signs & Symptoms:  Fevers: No  History:   Trauma to the area: No  Previous history of gout: YES  Recent illness: No  Alcohol use: No  Diuretic use: No  Precipitating or alleviating factors: None  Therapies tried and outcome: OTC topical pain cream      Hypertension Follow-up      Do you check your blood pressure regularly outside of the clinic? No     Are you following a low salt diet? Yes    Are your blood pressures ever more than 140 on the top number (systolic) OR more   than 90 on the bottom number (diastolic), for example 140/90? No    Lisinopril 10 mg daily    Patient takes mirtazapine 15 mg at at bedtime for sleep this also helps him with his depression    Review of Systems   Constitutional, HEENT, cardiovascular, pulmonary, gi and gu systems are negative, except as otherwise noted.      Objective    /76 (BP Location: Right arm, Patient Position: Sitting, Cuff Size: Adult Regular)   Pulse 91   Temp 97.5  F (36.4  C) (Oral)   Resp 16   Ht 1.702 m (5' 7\")   Wt 80.7 kg (178 lb)   SpO2 98%   BMI 27.88 kg/m    Body mass index is 27.88 kg/m .  Physical Exam   GENERAL: healthy, alert and no distress  NECK: no adenopathy, no asymmetry, masses, or scars and thyroid normal to palpation  RESP: lungs clear to auscultation - no rales, rhonchi or wheezes  CV: regular rates and rhythm, normal S1 S2, no S3 or S4, grade 3/6 systolic murmur heard " best over the sternum, peripheral pulses strong and no peripheral edema  MS: The patient's base of the big toe on the right is inflamed and tender there is slight erythema.  He has no pain to moving his ankle or the other toes  PSYCH: mentation appears normal, affect normal/bright

## 2023-05-05 ENCOUNTER — ANCILLARY PROCEDURE (OUTPATIENT)
Dept: CARDIOLOGY | Facility: CLINIC | Age: 88
End: 2023-05-05
Attending: FAMILY MEDICINE
Payer: COMMERCIAL

## 2023-05-05 DIAGNOSIS — R01.1 NEWLY RECOGNIZED MURMUR: ICD-10-CM

## 2023-05-05 LAB — LVEF ECHO: NORMAL

## 2023-05-05 PROCEDURE — 93306 TTE W/DOPPLER COMPLETE: CPT | Performed by: INTERNAL MEDICINE

## 2023-05-05 PROCEDURE — 99207 PR STATISTIC IV PUSH SINGLE INITIAL SUBSTANCE: CPT | Performed by: INTERNAL MEDICINE

## 2023-05-05 RX ADMIN — Medication 3 ML: at 14:01

## 2023-05-11 ENCOUNTER — TELEPHONE (OUTPATIENT)
Dept: FAMILY MEDICINE | Facility: CLINIC | Age: 88
End: 2023-05-11
Payer: COMMERCIAL

## 2023-05-11 DIAGNOSIS — R93.1 ABNORMAL ECHOCARDIOGRAM: Primary | ICD-10-CM

## 2023-05-11 NOTE — TELEPHONE ENCOUNTER
Spoke with daughter- we will get Authorization to discuss form mailed to their home address, they can fill it out sign it and mail it back to us. Daughter agreed with plan- consent and stamped envelope mailed to patient's home.      Christi Barnes MA

## 2023-05-11 NOTE — TELEPHONE ENCOUNTER
Routing to PCP    Patient and daughter calling about ECHO results.    PCP recommended heart doctor and they are wondering if you have any recommendations. Do they need a referral?    Chelo Calabrese RN

## 2023-05-11 NOTE — TELEPHONE ENCOUNTER
I called and spoke to the patient and his daughter about the results of his echocardiogram.  I have done a referral to the cardiology group at [facility].  The patient and his daughter are requesting a form to be completed so she can call and talk to us about his health care.    Chandrika Cordova DO

## 2023-06-06 ENCOUNTER — OFFICE VISIT (OUTPATIENT)
Dept: FAMILY MEDICINE | Facility: CLINIC | Age: 88
End: 2023-06-06
Payer: COMMERCIAL

## 2023-06-06 ENCOUNTER — NURSE TRIAGE (OUTPATIENT)
Dept: FAMILY MEDICINE | Facility: CLINIC | Age: 88
End: 2023-06-06

## 2023-06-06 VITALS
HEART RATE: 78 BPM | RESPIRATION RATE: 26 BRPM | WEIGHT: 178.8 LBS | DIASTOLIC BLOOD PRESSURE: 73 MMHG | HEIGHT: 67 IN | TEMPERATURE: 97.6 F | SYSTOLIC BLOOD PRESSURE: 148 MMHG | BODY MASS INDEX: 28.06 KG/M2 | OXYGEN SATURATION: 96 %

## 2023-06-06 DIAGNOSIS — R01.1 HEART MURMUR: ICD-10-CM

## 2023-06-06 DIAGNOSIS — I10 PRIMARY HYPERTENSION: ICD-10-CM

## 2023-06-06 DIAGNOSIS — R05.1 ACUTE COUGH: ICD-10-CM

## 2023-06-06 DIAGNOSIS — J02.9 SORE THROAT: ICD-10-CM

## 2023-06-06 DIAGNOSIS — J06.9 VIRAL URI WITH COUGH: Primary | ICD-10-CM

## 2023-06-06 LAB — DEPRECATED S PYO AG THROAT QL EIA: NEGATIVE

## 2023-06-06 PROCEDURE — 99214 OFFICE O/P EST MOD 30 MIN: CPT | Performed by: NURSE PRACTITIONER

## 2023-06-06 PROCEDURE — 87635 SARS-COV-2 COVID-19 AMP PRB: CPT | Performed by: NURSE PRACTITIONER

## 2023-06-06 PROCEDURE — 87651 STREP A DNA AMP PROBE: CPT | Performed by: NURSE PRACTITIONER

## 2023-06-06 RX ORDER — BENZONATATE 100 MG/1
100 CAPSULE ORAL 3 TIMES DAILY PRN
Qty: 30 CAPSULE | Refills: 0 | Status: SHIPPED | OUTPATIENT
Start: 2023-06-06

## 2023-06-06 ASSESSMENT — ENCOUNTER SYMPTOMS
APPETITE CHANGE: 0
RHINORRHEA: 1
WHEEZING: 0
COUGH: 1
HEADACHES: 0
SORE THROAT: 1
FATIGUE: 0
SINUS PRESSURE: 0
FEVER: 0
SHORTNESS OF BREATH: 1

## 2023-06-06 ASSESSMENT — PAIN SCALES - GENERAL: PAINLEVEL: MODERATE PAIN (4)

## 2023-06-06 NOTE — PATIENT INSTRUCTIONS
Your rapid strep test was negative. You have viral pharyngitis.  You may take Ibuprofen and/or Tylenol for pain/fevers.  Other supportive therapy to try: cepacol throat lozenges, salt water gargles, soft and cold foods.  Rest and stay hydrated.  If symptoms worsen or do not improve over the next week, follow-up with your PCP.    Strep test was negative.     COVID test sent off.     Monitor blood pressure at home. Follow-up with PCP if blood pressure is consistently >140/90.

## 2023-06-06 NOTE — PROGRESS NOTES
Assessment & Plan     Viral URI with cough  Symptoms consistent with viral infection. Discussed OTC options.     Acute cough  Benzonatate prescribed for cough. Side effects, risks and benefits of medication were discussed with patient. Discussed how and when to take medication.     - benzonatate (TESSALON) 100 MG capsule; Take 1 capsule (100 mg) by mouth 3 times daily as needed for cough    Sore throat  Rapid strep test. PCR sent off. COVID testing done.     - Streptococcus A Rapid Screen w/Reflex to PCR - Clinic Collect  - Symptomatic COVID-19 Virus (Coronavirus) by PCR Nose  - Group A Streptococcus PCR Throat Swab    Primary hypertension  Slightly elevated in clinic. Likely 2/2 illness. Advised he continue to monitor at home and follow-up if >140/90.     Murmur  Chronic in nature. No concerns today. Daughter state it has been worked up in the past.    Ordering of each unique test  Prescription drug management         Patient Instructions   Your rapid strep test was negative. You have viral pharyngitis.  1. You may take Ibuprofen and/or Tylenol for pain/fevers.  2. Other supportive therapy to try: cepacol throat lozenges, salt water gargles, soft and cold foods.  3. Rest and stay hydrated.  4. If symptoms worsen or do not improve over the next week, follow-up with your PCP.    Strep test was negative.     COVID test sent off.     Monitor blood pressure at home. Follow-up with PCP if blood pressure is consistently >140/90.       Kristi Gallegos DNP  [facility]    Genoveva Lacy is a 89 year old, presenting for the following health issues:  Cough        6/6/2023     1:14 PM   Additional Questions   Roomed by Anitha   Accompanied by Daughter Marissa         6/6/2023     1:14 PM   Patient Reported Additional Medications   Patient reports taking the following new medications none     HPI   Answer Assessment - Initial Assessment Questions     Nose congested, sore throat,  No  "wheezing    1. ONSET: \"When did the cough begin?\"       2-3 days  2. SEVERITY: \"How bad is the cough today?\"       Coughs thru the night and day    3. SPUTUM: \"Describe the color of your sputum\"Productive.    Does not know color  4. HEMOPTYSIS: \"Are you coughing up any blood?\" no        5. DIFFICULTY BREATHING: \"Are you having difficulty breathing?   Has not noticed    6. FEVER: \"Do you have a fever?\" no       Temp yesterday 98.5    7. CARDIAC HISTORY: \"Do you have any history of heart disease?\"   heart murmer  8. LUNG HISTORY: \"Do you have any history of lung disease?\"      no  9. PE RISK FACTORS: \"Do you have a history of blood clots?\"   no  12. TRAVEL no            Additional provider notes: Patient presents in clinic for URI symptoms that started 3 days ago. He is c/o cough, very runny nose, congestion and mild sore throat. He is wanting strep and COVID testing done today.     He received the COVID vaccine on Friday (4 days ago). States he was not sick prior to the vaccines. He denies getting sick after previous vaccines.     HTN: taking lisinopril. Took medication today.     Murmur: states he has a hx of murmur that has been worked up and is benign per the daughter.    Allergies   Allergen Reactions     Nkda [No Known Drug Allergy]      Simvastatin Muscle Pain (Myalgia)       Current Outpatient Medications   Medication     cholecalciferol (VITAMIN D3) 05167 UNIT capsule     co-enzyme Q-10 100 MG CAPS capsule     cyanocolbalamin (VITAMIN  B-12) 500 MCG tablet     diclofenac (VOLTAREN) 1 % topical gel     Flaxseed, Linseed, (FLAXSEED OIL) 1200 MG CAPS     Garlic 2000 MG CAPS     Ginseng 250 MG CAPS     glucosamine 500 MG CAPS capsule     lisinopril (ZESTRIL) 10 MG tablet     Melatonin 10 MG TABS tablet     mirtazapine (REMERON) 15 MG tablet     Multiple Vitamins-Minerals (ENERGY BOOSTER PO)     NEW MED     Omega-3 Fatty Acids (FISH OIL) 1200 MG capsule     polyethylene glycol (MIRALAX) 17 GM/Dose powder     " "Probiotic Product (ADVANCED PROBIOTIC PO)     Psyllium (METAMUCIL FIBER PO)     Turmeric 500 MG CAPS     UNABLE TO FIND     vitamin C (ASCORBIC ACID) 250 MG tablet     predniSONE (DELTASONE) 20 MG tablet     Current Facility-Administered Medications   Medication     triamcinolone (KENALOG-40) injection 40 mg       Past Medical History:   Diagnosis Date     BPH (benign prostatic hyperplasia)      Elevated cholesterol      HTN (hypertension)      Other malignant neoplasm of skin, site unspecified     Non-melanoma skin cancer            Review of Systems   Constitutional: Negative for appetite change, fatigue and fever.   HENT: Positive for congestion, rhinorrhea and sore throat (mild). Negative for sinus pressure.    Respiratory: Positive for cough and shortness of breath (with walking). Negative for wheezing.    Neurological: Negative for headaches.   All other systems reviewed and are negative.           Objective    BP (!) 148/73 (BP Location: Right arm, Patient Position: Sitting, Cuff Size: Adult Regular)   Pulse 78   Temp 97.6  F (36.4  C) (Tympanic)   Resp 26   Ht 1.702 m (5' 7\")   Wt 81.1 kg (178 lb 12.8 oz)   SpO2 96%   BMI 28.00 kg/m    Body mass index is 28 kg/m .  Physical Exam  Vitals reviewed.   Constitutional:       General: He is not in acute distress.     Appearance: Normal appearance. He is not ill-appearing or toxic-appearing.   HENT:      Right Ear: Ear canal and external ear normal. There is no impacted cerumen. Tympanic membrane is bulging.      Left Ear: Ear canal and external ear normal. There is no impacted cerumen. Tympanic membrane is bulging.      Nose: Rhinorrhea present.      Mouth/Throat:      Mouth: Mucous membranes are moist.      Pharynx: Oropharynx is clear. No posterior oropharyngeal erythema.   Cardiovascular:      Rate and Rhythm: Normal rate and regular rhythm.      Pulses: Normal pulses.      Heart sounds: Murmur (very mild, not new) heard.   Pulmonary:      Effort: " Pulmonary effort is normal.      Breath sounds: Normal breath sounds.   Skin:     General: Skin is warm and dry.   Neurological:      Mental Status: He is alert and oriented to person, place, and time.   Psychiatric:         Behavior: Behavior normal.            Results for orders placed or performed in visit on 06/06/23 (from the past 24 hour(s))   Streptococcus A Rapid Screen w/Reflex to PCR - Clinic Collect    Specimen: Throat; Swab   Result Value Ref Range    Group A Strep antigen Negative Negative

## 2023-06-06 NOTE — TELEPHONE ENCOUNTER
CARE ADVICE: BE SN TODAY/TOMMOROW    RN scheduled patient for today.    RN received call from patients daughter.    Consent to communicate on file.    Patient has had a cough and stuffy nose for 3 days.  Also sore throat    Denies wheezing, chest pain, fever.    Cough is productive.    Unsure what color sputum is.    Has not tested for Covid    RN assisted daughter in scheduling appointment today.    Reason for Disposition    Patient wants to be seen    Additional Information    Negative: Bluish (or gray) lips or face    Negative: SEVERE difficulty breathing (e.g., struggling for each breath, speaks in single words)    Negative: Rapid onset of cough and has hives    Negative: Coughing started suddenly after medicine, an allergic food or bee sting    Negative: Difficulty breathing after exposure to flames, smoke, or fumes    Negative: Sounds like a life-threatening emergency to the triager    Negative: MODERATE difficulty breathing (e.g., speaks in phrases, SOB even at rest, pulse 100-120) and still present when not coughing    Negative: Chest pain present when not coughing    Negative: Passed out (i.e., fainted, collapsed and was not responding)    Negative: Patient sounds very sick or weak to the triager    Negative: MILD difficulty breathing (e.g., minimal/no SOB at rest, SOB with walking, pulse <100) and still present when not coughing    Negative: Coughed up > 1 tablespoon (15 ml) blood (Exception: Blood-tinged sputum.)    Negative: Fever > 103 F (39.4 C)    Negative: Fever > 101 F (38.3 C) and over 60 years of age    Negative: Fever > 100.0 F (37.8 C) and has diabetes mellitus or a weak immune system (e.g., HIV positive, cancer chemotherapy, organ transplant, splenectomy, chronic steroids)    Negative: Fever > 100.0 F (37.8 C) and bedridden (e.g., nursing home patient, stroke, chronic illness, recovering from surgery)    Negative: Increasing ankle swelling    Negative: Wheezing is present    Negative: SEVERE  "coughing spells (e.g., whooping sound after coughing, vomiting after coughing)    Negative: Coughing up silvia-colored (reddish-brown) or blood-tinged sputum    Negative: Fever present > 3 days (72 hours)    Negative: Fever returns after gone for over 24 hours and symptoms worse or not improved    Negative: Using nasal washes and pain medicine > 24 hours and sinus pain persists    Negative: Known COPD or other severe lung disease (i.e., bronchiectasis, cystic fibrosis, lung surgery) and worsening symptoms (i.e., increased sputum purulence or amount, increased breathing difficulty)    Answer Assessment - Initial Assessment Questions  1. ONSET: \"When did the cough begin?\"       2-3 days  2. SEVERITY: \"How bad is the cough today?\"       Coughs thru the night and day    3. SPUTUM: \"Describe the color of your sputum\" (none, dry cough; clear, white, yellow, green)      Productive.    Does not know color  4. HEMOPTYSIS: \"Are you coughing up any blood?\" If so ask: \"How much?\" (flecks, streaks, tablespoons, etc.)        5. DIFFICULTY BREATHING: \"Are you having difficulty breathing?\" If Yes, ask: \"How bad is it?\" (e.g., mild, moderate, severe)     - MILD: No SOB at rest, mild SOB with walking, speaks normally in sentences, can lie down, no retractions, pulse < 100.     - MODERATE: SOB at rest, SOB with minimal exertion and prefers to sit, cannot lie down flat, speaks in phrases, mild retractions, audible wheezing, pulse 100-120.     - SEVERE: Very SOB at rest, speaks in single words, struggling to breathe, sitting hunched forward, retractions, pulse > 120       Has not noticed  6. FEVER: \"Do you have a fever?\" If Yes, ask: \"What is your temperature, how was it measured, and when did it start?\"      Temp yesterday 98.5  7. CARDIAC HISTORY: \"Do you have any history of heart disease?\" (e.g., heart attack, congestive heart failure)    heart murmer  8. LUNG HISTORY: \"Do you have any history of lung disease?\"  (e.g., pulmonary " "embolus, asthma, emphysema)      no  9. PE RISK FACTORS: \"Do you have a history of blood clots?\" (or: recent major surgery, recent prolonged travel, bedridden)   no  10. OTHER SYMPTOMS: \"Do you have any other symptoms?\" (e.g., runny nose, wheezing, chest pain)        Nose congested, sore throat,  No wheezing, chest pains  11. PREGNANCY: \"Is there any chance you are pregnant?\" \"When was your last menstrual period?\"        NA  12. TRAVEL: \"Have you traveled out of the country in the last month?\" (e.g., travel history, exposures)        NA    Protocols used: COUGH-A-OH    Arturo Gray, RN, BSN, PHN  [facility]  "

## 2023-06-07 LAB
GROUP A STREP BY PCR: NOT DETECTED
SARS-COV-2 RNA RESP QL NAA+PROBE: NEGATIVE

## 2023-08-28 NOTE — PROGRESS NOTES
Bambi is a 89 year old who is being evaluated via a billable telephone visit.      What phone number would you like to be contacted at?   How would you like to obtain your AVS? Mary    Distant Location (provider location):  On-site    Phone call duration: 20 minutes     HPI: Mr. Bambi Feldman is a 89 year old  male with PMH significant for..    Patient was seen by Dr. 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  Patient was also found to have newly recognized murmur and therefore underwent echocardiogram which showed anteroseptal akinesis with LVEF 50-55%.  No prior echocardiogram to compare.     He is following up with me today to discuss the results of echocardiogram.  This is a telephone visit.  Patient's daughter is also accompanying today's telephone visit.  He lives with his daughter.  Patient tells me that he is feeling well.  Denies chest pain, shortness of breath, dizziness, syncope or lower extremity edema.  He exercises regularly almost every day.  He lifts weights, push-ups exercise, rides his stationary bike for half an hour every day.  He takes aspirin 2 times a week.  Blood pressures well controlled at 138/76 mmHg.  His only complaint is knee pain.    Most recent EKG is from 2017 which shows sinus rhythm, no pathologic Q waves in the anteroseptal region.  Medications:  Lisinopril 10 mg  Patient has no prior cardiac history.  He quit smoking in 1970.  He has been on lisinopril for several years.  No diabetes.  Medications, personal, family, and social history reviewed with patient and revised.    PAST MEDICAL HISTORY:  Past Medical History:   Diagnosis Date    BPH (benign prostatic hyperplasia)     Elevated cholesterol     HTN (hypertension)     Other malignant neoplasm of skin, site unspecified     Non-melanoma skin cancer       CURRENT MEDICATIONS:  Current Outpatient Medications   Medication Sig Dispense Refill    benzonatate (TESSALON) 100 MG  capsule Take 1 capsule (100 mg) by mouth 3 times daily as needed for cough 30 capsule 0    cholecalciferol (VITAMIN D3) 90044 UNIT capsule Take 1 capsule by mouth daily.      co-enzyme Q-10 100 MG CAPS capsule Take by mouth daily 200mg      cyanocolbalamin (VITAMIN  B-12) 500 MCG tablet Take 500 mcg by mouth daily 2500 mg      diclofenac (VOLTAREN) 1 % topical gel Apply 2 gram to knees qid as needed 100 g 3    Flaxseed, Linseed, (FLAXSEED OIL) 1200 MG CAPS       Garlic 2000 MG CAPS Take 500 mg by mouth 500mg      Ginseng 250 MG CAPS       glucosamine 500 MG CAPS capsule Take 500 mg by mouth daily      lisinopril (ZESTRIL) 10 MG tablet Take 1 tablet (10 mg) by mouth daily 90 tablet 1    Melatonin 10 MG TABS tablet Take 5 mg by mouth nightly as needed for sleep      mirtazapine (REMERON) 15 MG tablet TAKE ONE TABLET BY MOUTH EVERY NIGHT AT BEDTIME AS NEEDED 90 tablet 1    Multiple Vitamins-Minerals (ENERGY BOOSTER PO)       NEW MED Beat root  1000mg      Omega-3 Fatty Acids (FISH OIL) 1200 MG capsule Take 1,200 mg by mouth daily      polyethylene glycol (MIRALAX) 17 GM/Dose powder Take 17 g (1 capful) by mouth daily 507 g 11    predniSONE (DELTASONE) 20 MG tablet 2 tabs daily x 3 days, then 1 tab daily x 3 days, then 1/2 tab daily x 3 days. (Patient not taking: Reported on 6/6/2023) 12 tablet 0    Probiotic Product (ADVANCED PROBIOTIC PO)       Psyllium (METAMUCIL FIBER PO)       Turmeric 500 MG CAPS Take by mouth daily      UNABLE TO FIND daily MEDICATION NAME: Super Vitamin B      vitamin C (ASCORBIC ACID) 250 MG tablet Take 250 mg by mouth daily         PAST SURGICAL HISTORY:  Past Surgical History:   Procedure Laterality Date    INJECT BLOCK MEDIAL BRANCH CERVICAL/THORACIC/LUMBAR Bilateral 2/15/2022    Procedure: Bilateral L4-5 and L5-S1 facet joint steroid injection with fluoroscopy;  Surgeon: Kaylee Stephenson MD;  Location: [facility] OR    SUPRAPUBIC PROSTATECTOMY      SURGICAL HISTORY OF -       skin cancer right neck  and lip       ALLERGIES:     Allergies   Allergen Reactions    Nkda [No Known Drug Allergy]     Simvastatin Muscle Pain (Myalgia)       FAMILY HISTORY:  Family History   Problem Relation Age of Onset    Alzheimer Disease Sister     C.A.D. No family hx of     Diabetes No family hx of     Hypertension No family hx of     Cerebrovascular Disease No family hx of     Breast Cancer No family hx of     Cancer - colorectal No family hx of     Prostate Cancer No family hx of          SOCIAL HISTORY:  Social History     Tobacco Use    Smoking status: Former     Types: Cigarettes     Quit date: 1970     Years since quittin.6     Passive exposure: Never    Smokeless tobacco: Never   Vaping Use    Vaping Use: Never used   Substance Use Topics    Alcohol use: Yes     Comment: 1 drink per night     Drug use: No       Exam:  Telephone visit today.  No exam.     I have reviewed the labs and personally reviewed the imaging below and made my comment in the assessment and plan.    Labs:  CBC RESULTS:   Lab Results   Component Value Date    WBC 6.5 2017    RBC 4.41 2017    HGB 14.0 2022    HGB 13.6 2017    HCT 40.5 2017    MCV 92 2017    MCH 30.8 2017    MCHC 33.6 2017    RDW 13.8 2017     2017       BMP RESULTS:  Lab Results   Component Value Date     (L) 2023     (L) 09/15/2020    POTASSIUM 4.7 2023    POTASSIUM 4.8 2022    POTASSIUM 4.9 09/15/2020    CHLORIDE 98 2023    CHLORIDE 98 2022    CHLORIDE 102 09/15/2020    CO2 21 (L) 2023    CO2 24 2022    CO2 27 09/15/2020    ANIONGAP 13 2023    ANIONGAP 9 2022    ANIONGAP 2 (L) 09/15/2020    GLC 99 2023    GLC 96 2022     (H) 09/15/2020    BUN 14.8 2023    BUN 22 2022    BUN 17 09/15/2020    CR 1.38 (H) 2023    CR 1.33 (H) 09/15/2020    GFRESTIMATED 47 (L) 2023    GFRESTIMATED 46 (L) 09/15/2020     GFRESTBLACK 53 (L) 09/15/2020    EMILIO 9.4 04/24/2023    EMILIO 8.8 09/15/2020       Echocardiogram 5/5/2023  The visual ejection fraction is 50-55%.  Anteroseptal wall akinesis is present.  Right ventricular function, chamber size, wall motion, and thickness are  normal.  The inferior vena cava is normal.  No pericardial effusion is present.  No significant valvular abnormalities were noted.      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  I reviewed patient's echocardiogram 5/5/2023 which shows anteroseptal wall akinesis with well-preserved LVEF at 50 to 55%.  No other significant abnormalities.  The wall motion abnormality is likely due to prior silent MI though I do not have any new EKG since 2017.  No prior echocardiogram to compare either.  Despite the fact that he might have had silent MI given his normal functional capacity, asymptomatic status I think I would like to continue conservative medical management with aspirin and lisinopril 10 mg.  I do not think he needs metoprolol at this time.  I did inform patient's daughter who lives with him about the signs of coronary artery disease and heart failure including chest pain, shortness of breath, lower extremity edema.  He does not have any of the symptoms at this time.  He is exercising regularly.    Recommendations:  -Continue daily activity as he is  -Continue lisinopril 10 mg daily  -He is taking aspirin 2 times a week.  I recommended him to take aspirin at least 3-4 times a week.    Return to clinic as needed.    Total time spent 30 minutes including precharting, telephone visit and medical documentation.    Florentin THOMSON MD  [facility] Division of Cardiology  Pager [phone]

## 2023-08-29 ENCOUNTER — VIRTUAL VISIT (OUTPATIENT)
Dept: CARDIOLOGY | Facility: CLINIC | Age: 88
End: 2023-08-29
Attending: FAMILY MEDICINE
Payer: COMMERCIAL

## 2023-08-29 DIAGNOSIS — R93.1 ABNORMAL ECHOCARDIOGRAM: Primary | ICD-10-CM

## 2023-08-29 DIAGNOSIS — I25.2 HISTORY OF MYOCARDIAL INFARCTION: ICD-10-CM

## 2023-08-29 PROCEDURE — 99442 PR PHYSICIAN TELEPHONE EVALUATION 11-20 MIN: CPT | Mod: 95 | Performed by: INTERNAL MEDICINE

## 2023-08-29 NOTE — LETTER
8/29/2023      RE: Bambi Feldman  [address] [address]       Dear Colleague,    Thank you for the opportunity to participate in the care of your patient, Bambi Feldman, at the [facility]. Please see a copy of my visit note below.        Phone call duration: 20 minutes     HPI: Mr. Bambi Feldman is a 89 year old  male with PMH significant for..    Patient was seen by Dr. 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  Patient was also found to have newly recognized murmur and therefore underwent echocardiogram which showed anteroseptal akinesis with LVEF 50-55%.  No prior echocardiogram to compare.     He is following up with me today to discuss the results of echocardiogram.  This is a telephone visit.  Patient's daughter is also accompanying today's telephone visit.  He lives with his daughter.  Patient tells me that he is feeling well.  Denies chest pain, shortness of breath, dizziness, syncope or lower extremity edema.  He exercises regularly almost every day.  He lifts weights, push-ups exercise, rides his stationary bike for half an hour every day.  He takes aspirin 2 times a week.  Blood pressures well controlled at 138/76 mmHg.  His only complaint is knee pain.    Most recent EKG is from 2017 which shows sinus rhythm, no pathologic Q waves in the anteroseptal region.  Medications:  Lisinopril 10 mg  Patient has no prior cardiac history.  He quit smoking in 1970.  He has been on lisinopril for several years.  No diabetes.  Medications, personal, family, and social history reviewed with patient and revised.    PAST MEDICAL HISTORY:  Past Medical History:   Diagnosis Date    BPH (benign prostatic hyperplasia)     Elevated cholesterol     HTN (hypertension)     Other malignant neoplasm of skin, site unspecified     Non-melanoma skin cancer       CURRENT  MEDICATIONS:  Current Outpatient Medications   Medication Sig Dispense Refill    benzonatate (TESSALON) 100 MG capsule Take 1 capsule (100 mg) by mouth 3 times daily as needed for cough 30 capsule 0    cholecalciferol (VITAMIN D3) 23582 UNIT capsule Take 1 capsule by mouth daily.      co-enzyme Q-10 100 MG CAPS capsule Take by mouth daily 200mg      cyanocolbalamin (VITAMIN  B-12) 500 MCG tablet Take 500 mcg by mouth daily 2500 mg      diclofenac (VOLTAREN) 1 % topical gel Apply 2 gram to knees qid as needed 100 g 3    Flaxseed, Linseed, (FLAXSEED OIL) 1200 MG CAPS       Garlic 2000 MG CAPS Take 500 mg by mouth 500mg      Ginseng 250 MG CAPS       glucosamine 500 MG CAPS capsule Take 500 mg by mouth daily      lisinopril (ZESTRIL) 10 MG tablet Take 1 tablet (10 mg) by mouth daily 90 tablet 1    Melatonin 10 MG TABS tablet Take 5 mg by mouth nightly as needed for sleep      mirtazapine (REMERON) 15 MG tablet TAKE ONE TABLET BY MOUTH EVERY NIGHT AT BEDTIME AS NEEDED 90 tablet 1    Multiple Vitamins-Minerals (ENERGY BOOSTER PO)       NEW MED Beat root  1000mg      Omega-3 Fatty Acids (FISH OIL) 1200 MG capsule Take 1,200 mg by mouth daily      polyethylene glycol (MIRALAX) 17 GM/Dose powder Take 17 g (1 capful) by mouth daily 507 g 11    predniSONE (DELTASONE) 20 MG tablet 2 tabs daily x 3 days, then 1 tab daily x 3 days, then 1/2 tab daily x 3 days. (Patient not taking: Reported on 6/6/2023) 12 tablet 0    Probiotic Product (ADVANCED PROBIOTIC PO)       Psyllium (METAMUCIL FIBER PO)       Turmeric 500 MG CAPS Take by mouth daily      UNABLE TO FIND daily MEDICATION NAME: Super Vitamin B      vitamin C (ASCORBIC ACID) 250 MG tablet Take 250 mg by mouth daily         PAST SURGICAL HISTORY:  Past Surgical History:   Procedure Laterality Date    INJECT BLOCK MEDIAL BRANCH CERVICAL/THORACIC/LUMBAR Bilateral 2/15/2022    Procedure: Bilateral L4-5 and L5-S1 facet joint steroid injection with fluoroscopy;  Surgeon: Sachin  MD Kaylee;  Location: [facility] OR    SUPRAPUBIC PROSTATECTOMY      SURGICAL HISTORY OF -       skin cancer right neck and lip       ALLERGIES:     Allergies   Allergen Reactions    Nkda [No Known Drug Allergy]     Simvastatin Muscle Pain (Myalgia)       FAMILY HISTORY:  Family History   Problem Relation Age of Onset    Alzheimer Disease Sister     C.A.D. No family hx of     Diabetes No family hx of     Hypertension No family hx of     Cerebrovascular Disease No family hx of     Breast Cancer No family hx of     Cancer - colorectal No family hx of     Prostate Cancer No family hx of          SOCIAL HISTORY:  Social History     Tobacco Use    Smoking status: Former     Types: Cigarettes     Quit date: 1970     Years since quittin.6     Passive exposure: Never    Smokeless tobacco: Never   Vaping Use    Vaping Use: Never used   Substance Use Topics    Alcohol use: Yes     Comment: 1 drink per night     Drug use: No       Exam:  Telephone visit today.  No exam.     I have reviewed the labs and personally reviewed the imaging below and made my comment in the assessment and plan.    Labs:  CBC RESULTS:   Lab Results   Component Value Date    WBC 6.5 2017    RBC 4.41 2017    HGB 14.0 2022    HGB 13.6 2017    HCT 40.5 2017    MCV 92 2017    MCH 30.8 2017    MCHC 33.6 2017    RDW 13.8 2017     2017       BMP RESULTS:  Lab Results   Component Value Date     (L) 2023     (L) 09/15/2020    POTASSIUM 4.7 2023    POTASSIUM 4.8 2022    POTASSIUM 4.9 09/15/2020    CHLORIDE 98 2023    CHLORIDE 98 2022    CHLORIDE 102 09/15/2020    CO2 21 (L) 2023    CO2 24 2022    CO2 27 09/15/2020    ANIONGAP 13 2023    ANIONGAP 9 2022    ANIONGAP 2 (L) 09/15/2020    GLC 99 2023    GLC 96 2022     (H) 09/15/2020    BUN 14.8 2023    BUN 22 2022    BUN 17 09/15/2020    CR 1.38 (H)  04/24/2023    CR 1.33 (H) 09/15/2020    GFRESTIMATED 47 (L) 04/24/2023    GFRESTIMATED 46 (L) 09/15/2020    GFRESTBLACK 53 (L) 09/15/2020    EMILIO 9.4 04/24/2023    EMILIO 8.8 09/15/2020       Echocardiogram 5/5/2023  The visual ejection fraction is 50-55%.  Anteroseptal wall akinesis is present.  Right ventricular function, chamber size, wall motion, and thickness are  normal.  The inferior vena cava is normal.  No pericardial effusion is present.  No significant valvular abnormalities were noted.      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  I reviewed patient's echocardiogram 5/5/2023 which shows anteroseptal wall akinesis with well-preserved LVEF at 50 to 55%.  No other significant abnormalities.  The wall motion abnormality is likely due to prior silent MI though I do not have any new EKG since 2017.  No prior echocardiogram to compare either.  Despite the fact that he might have had silent MI given his normal functional capacity, asymptomatic status I think I would like to continue conservative medical management with aspirin and lisinopril 10 mg.  I do not think he needs metoprolol at this time.  I did inform patient's daughter who lives with him about the signs of coronary artery disease and heart failure including chest pain, shortness of breath, lower extremity edema.  He does not have any of the symptoms at this time.  He is exercising regularly.    Recommendations:  -Continue daily activity as he is  -Continue lisinopril 10 mg daily  -He is taking aspirin 2 times a week.  I recommended him to take aspirin at least 3-4 times a week.    Return to clinic as needed.    Total time spent 30 minutes including precharting, telephone visit and medical documentation.    Florentin THOMSON MD  [facility] Division of Cardiology  Pager [phone]            Please do not  hesitate to contact me if you have any questions/concerns.     Sincerely,     Florentin Ontiveros MD

## 2023-08-29 NOTE — PATIENT INSTRUCTIONS
Thank you for coming to the [facility] at [place]; please note the following instructions:    1. Follow-up as needed in cardiology        If you have any questions regarding your visit, please contact your care team:     CARDIOLOGY  TELEPHONE NUMBER   Sherri HARVEY, Registered Nurse  Yara COREAS, Registered Nurse  Lilo CHACON, Registered Nurse  Mary JEFFERS, Registered Medical Assistant  Chanda STEINER, Certified Medical Assistant  Deloris HILL, Visit Facilitator [phone] (select option 1)    *After hours: [phone]   For Scheduling Appts:     [phone] (select option 1)    *After hours: [phone]   For the Device Clinic (Pacemakers and ICD's)  Patience LAND, Registered Nurse   During business hours: [phone]    *After business hours:  [phone] (select option 4)      Normal test result notifications will be released via [employer] or mailed within 7 business days.  All other test results, will be communicated via telephone once reviewed by your cardiologist.    If you need a medication refill, please contact your pharmacy.  Please allow 3 business days for your refill to be completed.    As always, thank you for trusting us with your health care needs!

## 2023-08-29 NOTE — NURSING NOTE
Writer unable to go through medications in depth during virtual rooming for cardiology appointment on 8/29/23. Patient daughter stated medications have not changed since last reviewed on 6/6/23 and she could not find her sheet with patient's medications listed. Writer verbally told Dr. Ontiveros who verbalized understanding.    ADELIA Morrell

## 2023-11-02 ENCOUNTER — TELEPHONE (OUTPATIENT)
Dept: FAMILY MEDICINE | Facility: CLINIC | Age: 88
End: 2023-11-02
Payer: COMMERCIAL

## 2023-11-02 DIAGNOSIS — I10 PRIMARY HYPERTENSION: Primary | ICD-10-CM

## 2023-11-02 NOTE — TELEPHONE ENCOUNTER
Patient's daughter, Marissa calling (signed consent to communicate PHI on file)  She noted that patient is due for some labs.    Annual review of  orders not current.      Marissa is requesting any lab orders due  Please call her back once orders are placed    Ok to leave detailed VM on her phone    Claudy Caldwell RN  [facility]

## 2023-11-06 DIAGNOSIS — I10 HYPERTENSION GOAL BP (BLOOD PRESSURE) < 140/90: ICD-10-CM

## 2023-11-07 RX ORDER — LISINOPRIL 10 MG/1
10 TABLET ORAL DAILY
Qty: 90 TABLET | Refills: 1 | Status: SHIPPED | OUTPATIENT
Start: 2023-11-07 | End: 2024-05-14

## 2023-11-25 ENCOUNTER — HEALTH MAINTENANCE LETTER (OUTPATIENT)
Age: 88
End: 2023-11-25

## 2023-12-05 ENCOUNTER — TELEPHONE (OUTPATIENT)
Dept: FAMILY MEDICINE | Facility: CLINIC | Age: 88
End: 2023-12-05
Payer: COMMERCIAL

## 2023-12-05 DIAGNOSIS — N18.30 STAGE 3 CHRONIC KIDNEY DISEASE, UNSPECIFIED WHETHER STAGE 3A OR 3B CKD (H): Primary | ICD-10-CM

## 2023-12-05 NOTE — TELEPHONE ENCOUNTER
Daughter Marissa (consent on file) wanting PCP to place annual labs so he can have these drawn in advance from annual wellness that is scheduled 1/26/23.     She is requesting lab orders for: BMP, Urine for Kidney function, Hemoglobin and cholesterol (if needed).     She would like to be updated when these are placed.     Kassandra Holm RN on 12/5/2023 at 2:40 PM

## 2023-12-07 NOTE — TELEPHONE ENCOUNTER
Daughter Marissa notified that orders have been placed. She will call back to schedule lab only appt once she knows her schedule     Christi Barnes MA

## 2024-01-02 DIAGNOSIS — G47.09 OTHER INSOMNIA: ICD-10-CM

## 2024-01-03 RX ORDER — MIRTAZAPINE 15 MG/1
TABLET, FILM COATED ORAL
Qty: 90 TABLET | Refills: 0 | Status: SHIPPED | OUTPATIENT
Start: 2024-01-03 | End: 2024-03-26

## 2024-01-05 ENCOUNTER — LAB (OUTPATIENT)
Dept: LAB | Facility: CLINIC | Age: 89
End: 2024-01-05
Payer: COMMERCIAL

## 2024-01-05 DIAGNOSIS — N18.30 STAGE 3 CHRONIC KIDNEY DISEASE, UNSPECIFIED WHETHER STAGE 3A OR 3B CKD (H): ICD-10-CM

## 2024-01-05 LAB
ANION GAP SERPL CALCULATED.3IONS-SCNC: 9 MMOL/L (ref 7–15)
BUN SERPL-MCNC: 20.3 MG/DL (ref 8–23)
CALCIUM SERPL-MCNC: 9.5 MG/DL (ref 8.2–9.6)
CHLORIDE SERPL-SCNC: 99 MMOL/L (ref 98–107)
CREAT SERPL-MCNC: 1.47 MG/DL (ref 0.67–1.17)
DEPRECATED HCO3 PLAS-SCNC: 24 MMOL/L (ref 22–29)
EGFRCR SERPLBLD CKD-EPI 2021: 43 ML/MIN/1.73M2
ERYTHROCYTE [DISTWIDTH] IN BLOOD BY AUTOMATED COUNT: 13.2 % (ref 10–15)
GLUCOSE SERPL-MCNC: 104 MG/DL (ref 70–99)
HCT VFR BLD AUTO: 42.4 % (ref 40–53)
HGB BLD-MCNC: 14.2 G/DL (ref 13.3–17.7)
MCH RBC QN AUTO: 30.5 PG (ref 26.5–33)
MCHC RBC AUTO-ENTMCNC: 33.5 G/DL (ref 31.5–36.5)
MCV RBC AUTO: 91 FL (ref 78–100)
PLATELET # BLD AUTO: 298 10E3/UL (ref 150–450)
POTASSIUM SERPL-SCNC: 5 MMOL/L (ref 3.4–5.3)
RBC # BLD AUTO: 4.66 10E6/UL (ref 4.4–5.9)
SODIUM SERPL-SCNC: 132 MMOL/L (ref 135–145)
WBC # BLD AUTO: 9.6 10E3/UL (ref 4–11)

## 2024-01-05 PROCEDURE — 80048 BASIC METABOLIC PNL TOTAL CA: CPT

## 2024-01-05 PROCEDURE — 85027 COMPLETE CBC AUTOMATED: CPT

## 2024-01-05 PROCEDURE — 36415 COLL VENOUS BLD VENIPUNCTURE: CPT

## 2024-02-14 ENCOUNTER — MYC MEDICAL ADVICE (OUTPATIENT)
Dept: FAMILY MEDICINE | Facility: CLINIC | Age: 89
End: 2024-02-14
Payer: COMMERCIAL

## 2024-02-14 NOTE — TELEPHONE ENCOUNTER
Patient Quality Outreach    Patient is due for the following:   Physical Annual Wellness Visit    Next Steps:   Schedule a Annual Wellness Visit    Patient had appt scheduled for 1/26/24 but he cancelled and has not rescheduled.    Type of outreach:    Sent [employer] message.      Questions for provider review:    None           Christi Barnes CMA         Wound Care: Polysporin ointment

## 2024-02-21 NOTE — TELEPHONE ENCOUNTER
Patient/ proxy has read [employer] message     Christi Barnes MA     Doxepin Pregnancy And Lactation Text: This medication is Pregnancy Category C and it isn't known if it is safe during pregnancy. It is also excreted in breast milk and breast feeding isn't recommended.

## 2024-03-26 DIAGNOSIS — G47.09 OTHER INSOMNIA: ICD-10-CM

## 2024-03-26 RX ORDER — MIRTAZAPINE 15 MG/1
TABLET, FILM COATED ORAL
Qty: 90 TABLET | Refills: 0 | Status: SHIPPED | OUTPATIENT
Start: 2024-03-26 | End: 2024-07-30

## 2024-05-14 DIAGNOSIS — I10 HYPERTENSION GOAL BP (BLOOD PRESSURE) < 140/90: ICD-10-CM

## 2024-05-14 RX ORDER — LISINOPRIL 10 MG/1
10 TABLET ORAL DAILY
Qty: 90 TABLET | Refills: 0 | Status: SHIPPED | OUTPATIENT
Start: 2024-05-14 | End: 2024-07-24

## 2024-06-03 ENCOUNTER — TELEPHONE (OUTPATIENT)
Dept: FAMILY MEDICINE | Facility: CLINIC | Age: 89
End: 2024-06-03
Payer: COMMERCIAL

## 2024-06-03 NOTE — TELEPHONE ENCOUNTER
Patient Quality Outreach    Patient is due for the following:   Physical Annual Wellness Visit    Next Steps:   Schedule a Annual Wellness Visit    Type of outreach:    Phone, left message for patient/parent to call back.      Questions for provider review:    None           Christi Barnes CMA

## 2024-06-03 NOTE — LETTER
Sabrina 10, 2024      Bambi Feldman  [address] [facility] [address]      Your healthcare team cares about your health. To provide you with the best care, we have reviewed your chart and based on our findings, we see that you are due to:     1) PREVENTATIVE VISIT: Annual Medicare Wellness: Schedule an Annual Medicare Wellness Exam. Please call your [facility] to set up your appointment.    If you have already completed these items, please contact the clinic via phone or Mychart so your care team can review and update your records.  Thank you for choosing [facility] for your healthcare needs. For any questions, concerns, or to schedule an appointment please contact the clinic.     Healthy Regards,    Your [facility] Care Team

## 2024-07-23 DIAGNOSIS — I10 HYPERTENSION GOAL BP (BLOOD PRESSURE) < 140/90: ICD-10-CM

## 2024-07-24 RX ORDER — LISINOPRIL 10 MG/1
10 TABLET ORAL DAILY
Qty: 90 TABLET | Refills: 0 | Status: SHIPPED | OUTPATIENT
Start: 2024-07-24

## 2024-07-24 NOTE — TELEPHONE ENCOUNTER
Rx for 90 days.  Patient has not been seen over 1 year.  Please schedule an in person preventative visit for future refills with PCP. If possible, please go over any overdue screening questionnaires (PHQ-2, PHQ-9, ACT, etc.).

## 2024-07-25 DIAGNOSIS — G47.09 OTHER INSOMNIA: ICD-10-CM

## 2024-07-25 RX ORDER — MIRTAZAPINE 15 MG/1
TABLET, FILM COATED ORAL
Qty: 90 TABLET | Refills: 0 | OUTPATIENT
Start: 2024-07-25

## 2024-07-25 NOTE — TELEPHONE ENCOUNTER
Called patient and left a voicemail message to call the clinic and schedule an Annual wellness/ med check appointment.      Natalie Faith

## 2024-07-29 DIAGNOSIS — G47.09 OTHER INSOMNIA: ICD-10-CM

## 2024-07-30 RX ORDER — MIRTAZAPINE 15 MG/1
TABLET, FILM COATED ORAL
Qty: 90 TABLET | Refills: 0 | Status: SHIPPED | OUTPATIENT
Start: 2024-07-30

## 2024-07-30 NOTE — TELEPHONE ENCOUNTER
Future Appointments 7/30/2024 - 1/26/2025        Date Visit Type Length Department Provider     10/31/2024 12:30 PM ANNUAL WELLNESS 30 min [facility]/Chandrika Lyle DO    Location Instructions:     [facility] is located at [address], just north of the exit off of [address]. Free parking is available. If traveling north, access the lot directly from [address]; if traveling south on [address], turn east on [address] to access the lot.

## 2024-10-14 ENCOUNTER — TELEPHONE (OUTPATIENT)
Dept: FAMILY MEDICINE | Facility: CLINIC | Age: 89
End: 2024-10-14
Payer: COMMERCIAL

## 2024-10-14 NOTE — TELEPHONE ENCOUNTER
Patient Quality Outreach    Patient is due for the following:   Physical Annual Wellness Visit    Next Steps:   Schedule a Annual Wellness Visit    Type of outreach:    Chart review performed, no outreach needed.      Patient has appt scheduled for 10/31/24    Questions for provider review:    None           Christi Barnes CMA

## 2024-10-15 DIAGNOSIS — I10 HYPERTENSION GOAL BP (BLOOD PRESSURE) < 140/90: ICD-10-CM

## 2024-10-16 RX ORDER — LISINOPRIL 10 MG/1
10 TABLET ORAL DAILY
Qty: 90 TABLET | Refills: 0 | Status: SHIPPED | OUTPATIENT
Start: 2024-10-16 | End: 2024-10-31

## 2024-10-31 ENCOUNTER — OFFICE VISIT (OUTPATIENT)
Dept: FAMILY MEDICINE | Facility: CLINIC | Age: 89
End: 2024-10-31
Payer: COMMERCIAL

## 2024-10-31 VITALS
HEART RATE: 79 BPM | TEMPERATURE: 97.7 F | BODY MASS INDEX: 28.31 KG/M2 | HEIGHT: 67 IN | OXYGEN SATURATION: 95 % | DIASTOLIC BLOOD PRESSURE: 68 MMHG | WEIGHT: 180.4 LBS | SYSTOLIC BLOOD PRESSURE: 134 MMHG | RESPIRATION RATE: 20 BRPM

## 2024-10-31 DIAGNOSIS — H01.005 BLEPHARITIS OF LOWER EYELIDS OF BOTH EYES, UNSPECIFIED TYPE: ICD-10-CM

## 2024-10-31 DIAGNOSIS — Z29.11 NEED FOR VACCINATION AGAINST RESPIRATORY SYNCYTIAL VIRUS: ICD-10-CM

## 2024-10-31 DIAGNOSIS — N18.30 STAGE 3 CHRONIC KIDNEY DISEASE, UNSPECIFIED WHETHER STAGE 3A OR 3B CKD (H): ICD-10-CM

## 2024-10-31 DIAGNOSIS — G47.09 OTHER INSOMNIA: ICD-10-CM

## 2024-10-31 DIAGNOSIS — H01.002 BLEPHARITIS OF LOWER EYELIDS OF BOTH EYES, UNSPECIFIED TYPE: ICD-10-CM

## 2024-10-31 DIAGNOSIS — Z00.00 ENCOUNTER FOR MEDICARE ANNUAL WELLNESS EXAM: Primary | ICD-10-CM

## 2024-10-31 DIAGNOSIS — I10 HYPERTENSION GOAL BP (BLOOD PRESSURE) < 140/90: ICD-10-CM

## 2024-10-31 DIAGNOSIS — I10 PRIMARY HYPERTENSION: ICD-10-CM

## 2024-10-31 DIAGNOSIS — L98.9 SKIN LESION: ICD-10-CM

## 2024-10-31 DIAGNOSIS — M17.10 ARTHRITIS OF KNEE: ICD-10-CM

## 2024-10-31 LAB — HGB BLD-MCNC: 13 G/DL (ref 13.3–17.7)

## 2024-10-31 PROCEDURE — G0438 PPPS, INITIAL VISIT: HCPCS | Performed by: FAMILY MEDICINE

## 2024-10-31 PROCEDURE — 85018 HEMOGLOBIN: CPT | Performed by: FAMILY MEDICINE

## 2024-10-31 PROCEDURE — 82043 UR ALBUMIN QUANTITATIVE: CPT | Performed by: FAMILY MEDICINE

## 2024-10-31 PROCEDURE — 99213 OFFICE O/P EST LOW 20 MIN: CPT | Mod: 25 | Performed by: FAMILY MEDICINE

## 2024-10-31 PROCEDURE — 36415 COLL VENOUS BLD VENIPUNCTURE: CPT | Performed by: FAMILY MEDICINE

## 2024-10-31 PROCEDURE — 82570 ASSAY OF URINE CREATININE: CPT | Performed by: FAMILY MEDICINE

## 2024-10-31 PROCEDURE — 80048 BASIC METABOLIC PNL TOTAL CA: CPT | Performed by: FAMILY MEDICINE

## 2024-10-31 RX ORDER — MIRTAZAPINE 15 MG/1
TABLET, FILM COATED ORAL
Qty: 90 TABLET | Refills: 3 | Status: SHIPPED | OUTPATIENT
Start: 2024-10-31

## 2024-10-31 RX ORDER — ACETAMINOPHEN 500 MG
1000 TABLET ORAL 3 TIMES DAILY PRN
Qty: 100 TABLET | Refills: 5 | Status: SHIPPED | OUTPATIENT
Start: 2024-10-31

## 2024-10-31 RX ORDER — LISINOPRIL 10 MG/1
10 TABLET ORAL DAILY
Qty: 90 TABLET | Refills: 3 | Status: SHIPPED | OUTPATIENT
Start: 2024-10-31

## 2024-10-31 SDOH — HEALTH STABILITY: PHYSICAL HEALTH: ON AVERAGE, HOW MANY MINUTES DO YOU ENGAGE IN EXERCISE AT THIS LEVEL?: 30 MIN

## 2024-10-31 SDOH — HEALTH STABILITY: PHYSICAL HEALTH: ON AVERAGE, HOW MANY DAYS PER WEEK DO YOU ENGAGE IN MODERATE TO STRENUOUS EXERCISE (LIKE A BRISK WALK)?: 7 DAYS

## 2024-10-31 ASSESSMENT — PATIENT HEALTH QUESTIONNAIRE - PHQ9
SUM OF ALL RESPONSES TO PHQ QUESTIONS 1-9: 0
SUM OF ALL RESPONSES TO PHQ QUESTIONS 1-9: 0
10. IF YOU CHECKED OFF ANY PROBLEMS, HOW DIFFICULT HAVE THESE PROBLEMS MADE IT FOR YOU TO DO YOUR WORK, TAKE CARE OF THINGS AT HOME, OR GET ALONG WITH OTHER PEOPLE: NOT DIFFICULT AT ALL

## 2024-10-31 ASSESSMENT — ANXIETY QUESTIONNAIRES
IF YOU CHECKED OFF ANY PROBLEMS ON THIS QUESTIONNAIRE, HOW DIFFICULT HAVE THESE PROBLEMS MADE IT FOR YOU TO DO YOUR WORK, TAKE CARE OF THINGS AT HOME, OR GET ALONG WITH OTHER PEOPLE: NOT DIFFICULT AT ALL
1. FEELING NERVOUS, ANXIOUS, OR ON EDGE: NOT AT ALL
4. TROUBLE RELAXING: NOT AT ALL
GAD7 TOTAL SCORE: 0
GAD7 TOTAL SCORE: 0
3. WORRYING TOO MUCH ABOUT DIFFERENT THINGS: NOT AT ALL
GAD7 TOTAL SCORE: 0
7. FEELING AFRAID AS IF SOMETHING AWFUL MIGHT HAPPEN: NOT AT ALL
6. BECOMING EASILY ANNOYED OR IRRITABLE: NOT AT ALL
8. IF YOU CHECKED OFF ANY PROBLEMS, HOW DIFFICULT HAVE THESE MADE IT FOR YOU TO DO YOUR WORK, TAKE CARE OF THINGS AT HOME, OR GET ALONG WITH OTHER PEOPLE?: NOT DIFFICULT AT ALL
7. FEELING AFRAID AS IF SOMETHING AWFUL MIGHT HAPPEN: NOT AT ALL
2. NOT BEING ABLE TO STOP OR CONTROL WORRYING: NOT AT ALL
5. BEING SO RESTLESS THAT IT IS HARD TO SIT STILL: NOT AT ALL

## 2024-10-31 ASSESSMENT — SOCIAL DETERMINANTS OF HEALTH (SDOH): HOW OFTEN DO YOU GET TOGETHER WITH FRIENDS OR RELATIVES?: ONCE A WEEK

## 2024-10-31 ASSESSMENT — PAIN SCALES - GENERAL: PAINLEVEL_OUTOF10: EXTREME PAIN (8)

## 2024-10-31 NOTE — PATIENT INSTRUCTIONS
Patient Education   Preventive Care Advice   This is general advice given by our system to help you stay healthy. However, your care team may have specific advice just for you. Please talk to your care team about your preventive care needs.  Nutrition  Eat 5 or more servings of fruits and vegetables each day.  Try wheat bread, brown rice and whole grain pasta (instead of white bread, rice, and pasta).  Get enough calcium and vitamin D. Check the label on foods and aim for 100% of the RDA (recommended daily allowance).  Lifestyle  Exercise at least 150 minutes each week  (30 minutes a day, 5 days a week).  Do muscle strengthening activities 2 days a week. These help control your weight and prevent disease.  No smoking.  Wear sunscreen to prevent skin cancer.  Have a dental exam and cleaning every 6 months.  Yearly exams  See your health care team every year to talk about:  Any changes in your health.  Any medicines your care team has prescribed.  Preventive care, family planning, and ways to prevent chronic diseases.  Shots (vaccines)   HPV shots (up to age 26), if you've never had them before.  Hepatitis B shots (up to age 59), if you've never had them before.  COVID-19 shot: Get this shot when it's due.  Flu shot: Get a flu shot every year.  Tetanus shot: Get a tetanus shot every 10 years.  Pneumococcal, hepatitis A, and RSV shots: Ask your care team if you need these based on your risk.  Shingles shot (for age 50 and up)  General health tests  Diabetes screening:  Starting at age 35, Get screened for diabetes at least every 3 years.  If you are younger than age 35, ask your care team if you should be screened for diabetes.  Cholesterol test: At age 39, start having a cholesterol test every 5 years, or more often if advised.  Bone density scan (DEXA): At age 50, ask your care team if you should have this scan for osteoporosis (brittle bones).  Hepatitis C: Get tested at least once in your life.  STIs (sexually  transmitted infections)  Before age 24: Ask your care team if you should be screened for STIs.  After age 24: Get screened for STIs if you're at risk. You are at risk for STIs (including HIV) if:  You are sexually active with more than one person.  You don't use condoms every time.  You or a partner was diagnosed with a sexually transmitted infection.  If you are at risk for HIV, ask about PrEP medicine to prevent HIV.  Get tested for HIV at least once in your life, whether you are at risk for HIV or not.  Cancer screening tests  Cervical cancer screening: If you have a cervix, begin getting regular cervical cancer screening tests starting at age 21.  Breast cancer scan (mammogram): If you've ever had breasts, begin having regular mammograms starting at age 40. This is a scan to check for breast cancer.  Colon cancer screening: It is important to start screening for colon cancer at age 45.  Have a colonoscopy test every 10 years (or more often if you're at risk) Or, ask your provider about stool tests like a FIT test every year or Cologuard test every 3 years.  To learn more about your testing options, visit:   .  For help making a decision, visit:   https://bit.ly/ax76281.  Prostate cancer screening test: If you have a prostate, ask your care team if a prostate cancer screening test (PSA) at age 55 is right for you.  Lung cancer screening: If you are a current or former smoker ages 50 to 80, ask your care team if ongoing lung cancer screenings are right for you.  For informational purposes only. Not to replace the advice of your health care provider. Copyright   2023 [facility]. All rights reserved. Clinically reviewed by the [facility]. [employer] 400128 - REV 01/24.   Learning About Risk for Heart Attack and Stroke (01:56)  Your health professional recommends that you watch this short online health video.  Learn what raises your risk for having a heart attack or stroke and  how you can lower your risk.   Purpose: Explains risk factors for heart attack and stroke and ways to lower the risk.  Goal: Users will understand what it means to be at risk for having a heart attack or stroke and what they can do to reduce their risk.    Watch: Scan the QR code or visit the link to view video       https://hwi.se/r/H7xwbapeicncm  Current as of: June 24, 2023  Content Version: 14.2 2024 [facility] [employer].   Care instructions adapted under license by your healthcare professional. If you have questions about a medical condition or this instruction, always ask your healthcare professional. [employer] disclaims any warranty or liability for your use of this information.

## 2024-10-31 NOTE — PROGRESS NOTES
"Preventive Care Visit  [facility]  Chandrika DO Art, Family Medicine  Oct 31, 2024      Assessment & Plan     Encounter for Medicare annual wellness exam      Blepharitis of lower eyelids of both eyes, unspecified type  We discussed washing with baby shampoo and I gave him instructions on this.  If he does not see improvement I would recommend him going to see ophthalmology.    Arthritis of knee  Extra strength Tylenol recommended in addition to the topical Voltaren gel  - acetaminophen (TYLENOL) 500 MG tablet; Take 2 tablets (1,000 mg) by mouth 3 times daily as needed for mild pain.    Stage 3 chronic kidney disease, unspecified whether stage 3a or 3b CKD (H)  Monitor with labs today  - Hemoglobin; Future  - Hemoglobin  - Albumin Random Urine Quantitative with Creat Ratio    Other insomnia  The current medical regimen is effective;  continue present plan and medications.    - mirtazapine (REMERON) 15 MG tablet; TAKE ONE TABLET BY MOUTH EVERY NIGHT AT BEDTIME AS NEEDED    Hypertension goal BP (blood pressure) < 140/90  The current medical regimen is effective;  continue present plan and medications.    - lisinopril (ZESTRIL) 10 MG tablet; Take 1 tablet (10 mg) by mouth daily.    Skin lesion  The skin lesion on his nose is concerning for squamous cell skin cancer.  I have encouraged the patient to see dermatology.  He has a dermatologist he has seen in the past and [place] and will return there  - Adult Dermatology  Referral; Future    Need for vaccination against respiratory syncytial virus    - RSV vaccine, bivalent, ABRYSVO, injection; Inject 0.5 mLs into the muscle once for 1 dose. Pharmacist administered    Primary hypertension    - Basic metabolic panel  (Ca, Cl, CO2, Creat, Gluc, K, Na, BUN)            BMI  Estimated body mass index is 28.25 kg/m  as calculated from the following:    Height as of this encounter: 1.702 m (5' 7\").    Weight as of this encounter: 81.8 kg (180 lb " 6.4 oz).       Counseling  Appropriate preventive services were addressed with this patient via screening, questionnaire, or discussion as appropriate for fall prevention, nutrition, physical activity, Tobacco-use cessation, social engagement, weight loss and cognition.  Checklist reviewing preventive services available has been given to the patient.  Reviewed patient's diet, addressing concerns and/or questions.   The patient was instructed to see the dentist every 6 months.   Discussed possible causes of fatigue. Updated plan of care.  Patient reported difficulty with activities of daily living were addressed today.The patient was provided with written information regarding signs of hearing loss.       Follow-up in 6 months to a year    Subjective   Bambi is a 89 year old, presenting for the following:  Physical        10/31/2024    12:17 PM   Additional Questions   Roomed by Christi MEADOWS   Accompanied by daughter Marissa         10/31/2024    12:17 PM   Patient Reported Additional Medications   Patient reports taking the following new medications none           HPI    -- Athritis pain with both knees.  Voltern gel at times.    -- crusty eyelids in the morning     Hypertension Follow-up    Do you check your blood pressure regularly outside of the clinic? No   Are you following a low salt diet? Yes  Are your blood pressures ever more than 140 on the top number (systolic) OR more   than 90 on the bottom number (diastolic), for example 140/90? Yes    Depression and Anxiety   How are you doing with your depression since your last visit? No change  How are you doing with your anxiety since your last visit?  No change  Are you having other symptoms that might be associated with depression or anxiety? No  Have you had a significant life event? No   Do you have any concerns with your use of alcohol or other drugs? No    Social History     Tobacco Use    Smoking status: Former     Current packs/day: 0.00     Types: Cigarettes      Quit date: 1970     Years since quittin.8     Passive exposure: Never    Smokeless tobacco: Never   Vaping Use    Vaping status: Never Used   Substance Use Topics    Alcohol use: Yes     Comment: 1 drink per night     Drug use: No         2022     1:58 PM 2023    10:53 AM 10/31/2024    12:14 PM   PHQ   PHQ-9 Total Score 12 6 0    Q9: Thoughts of better off dead/self-harm past 2 weeks Not at all Not at all Not at all        Patient-reported         2022     1:58 PM 2023    10:53 AM 10/31/2024    12:14 PM   LOIDA-7 SCORE   Total Score   0 (minimal anxiety)   Total Score 3 0 0        Patient-reported         10/31/2024    12:14 PM   Last PHQ-9   1.  Little interest or pleasure in doing things 0    2.  Feeling down, depressed, or hopeless 0    3.  Trouble falling or staying asleep, or sleeping too much 0    4.  Feeling tired or having little energy 0    5.  Poor appetite or overeating 0    6.  Feeling bad about yourself 0    7.  Trouble concentrating 0    8.  Moving slowly or restless 0    Q9: Thoughts of better off dead/self-harm past 2 weeks 0    PHQ-9 Total Score 0        Patient-reported         10/31/2024    12:14 PM   LOIDA-7    1. Feeling nervous, anxious, or on edge 0    2. Not being able to stop or control worrying 0    3. Worrying too much about different things 0    4. Trouble relaxing 0    5. Being so restless that it is hard to sit still 0    6. Becoming easily annoyed or irritable 0    7. Feeling afraid, as if something awful might happen 0    LOIDA-7 Total Score 0    If you checked any problems, how difficult have they made it for you to do your work, take care of things at home, or get along with other people? Not difficult at all        Patient-reported       Suicide Assessment Five-step Evaluation and Treatment (SAFE-T)        Chronic Kidney Disease Follow-up    Do you take any over the counter pain medicine?: No        Health Care Directive  Patient does not have a Health  Care Directive: Advance Directive received and scanned. Click on Code in the patient header to view.      10/31/2024   General Health   How would you rate your overall physical health? Good   Feel stress (tense, anxious, or unable to sleep) Not at all            10/31/2024   Nutrition   Diet: Regular (no restrictions)            10/31/2024   Exercise   Days per week of moderate/strenous exercise 7 days   Average minutes spent exercising at this level 30 min            10/31/2024   Social Factors   Frequency of gathering with friends or relatives Once a week   Worry food won't last until get money to buy more No   Food not last or not have enough money for food? No   Do you have housing? (Housing is defined as stable permanent housing and does not include staying ouside in a car, in a tent, in an abandoned building, in an overnight shelter, or couch-surfing.) Yes   Are you worried about losing your housing? No   Lack of transportation? No   Unable to get utilities (heat,electricity)? No            10/31/2024   Fall Risk   Fallen 2 or more times in the past year? No     No    Trouble with walking or balance? No     No        Patient-reported    Multiple values from one day are sorted in reverse-chronological order          10/31/2024   Activities of Daily Living- Home Safety   Needs help with the following daily activites Preparing meals    Laundry   Safety concerns in the home None of the above       Multiple values from one day are sorted in reverse-chronological order         10/31/2024   Dental   Dentist two times every year? (!) NO            10/31/2024   Hearing Screening   Hearing concerns? (!) IT'S HARD TO FOLLOW A CONVERSATION IN A NOISY RESTAURANT OR CROWDED ROOM.            10/31/2024   Driving Risk Screening   Patient/family members have concerns about driving No            10/31/2024   General Alertness/Fatigue Screening   Have you been more tired than usual lately? (!) YES            10/31/2024   Urinary  Incontinence Screening   Bothered by leaking urine in past 6 months No            10/31/2024   TB Screening   Were you born outside of the [place]? No          Today's PHQ-9 Score:       10/31/2024    12:14 PM   PHQ-9 SCORE   PHQ-9 Total Score MyChart 0   PHQ-9 Total Score 0        Patient-reported         10/31/2024   Substance Use   Alcohol more than 3/day or more than 7/wk No   Do you have a current opioid prescription? No   How severe/bad is pain from 1 to 10? 8/10   Do you use any other substances recreationally? No        Social History     Tobacco Use    Smoking status: Former     Current packs/day: 0.00     Types: Cigarettes     Quit date: 1970     Years since quittin.8     Passive exposure: Never    Smokeless tobacco: Never   Vaping Use    Vaping status: Never Used   Substance Use Topics    Alcohol use: Yes     Comment: 1 drink per night     Drug use: No             Reviewed and updated as needed this visit by Provider                      Current providers sharing in care for this patient include:  Patient Care Team:  Chandrika Corodva DO as PCP - General (Family Practice)  Chandrika Cordova DO as Assigned PCP  Florentin Ontiveros MD as MD (Cardiovascular Disease)  Florentin Ontiveros MD as MD (Cardiovascular Disease)  Florentin Ontiveros MD as Assigned Heart and Vascular Provider    The following health maintenance items are reviewed in Epic and correct as of today:  Health Maintenance   Topic Date Due    DEPRESSION ACTION PLAN  Never done    URINALYSIS  Never done    RSV VACCINE (1 - 1-dose 75+ series) Never done    ZOSTER IMMUNIZATION (2 of 3) 2009    LIPID  10/17/2020    MEDICARE ANNUAL WELLNESS VISIT  2023    MICROALBUMIN  2023    ANNUAL REVIEW OF HM ORDERS  2023    HEMOGLOBIN  2025    BMP  2025    PHQ-9  2025    FALL RISK ASSESSMENT  10/31/2025    ADVANCE CARE PLANNING  10/10/2027    DTAP/TDAP/TD IMMUNIZATION (3 - Td or Tdap) 10/30/2028    INFLUENZA VACCINE  Completed     "Pneumococcal Vaccine: 65+ Years  Completed    COVID-19 Vaccine  Completed    HPV IMMUNIZATION  Aged Out    MENINGITIS IMMUNIZATION  Aged Out    RSV MONOCLONAL ANTIBODY  Aged Out         Review of Systems  Constitutional, HEENT, cardiovascular, pulmonary, gi and gu systems are negative, except as otherwise noted.     Objective    Exam  BP (!) 148/74 (BP Location: Right arm, Patient Position: Sitting, Cuff Size: Adult Regular)   Pulse 79   Temp 97.7  F (36.5  C) (Oral)   Resp 20   Ht 1.702 m (5' 7\")   Wt 81.8 kg (180 lb 6.4 oz)   SpO2 95%   BMI 28.25 kg/m     Estimated body mass index is 28.25 kg/m  as calculated from the following:    Height as of this encounter: 1.702 m (5' 7\").    Weight as of this encounter: 81.8 kg (180 lb 6.4 oz).    Physical Exam  GENERAL: alert and no distress  EYES: PERRL, EOMI, and eyelids-slightly erythematous with possible granulation tissue  HENT: ear canals and TM's normal, nose and mouth without ulcers or lesions  NECK: no adenopathy, no asymmetry, masses, or scars  RESP: lungs clear to auscultation - no rales, rhonchi or wheezes  CV: regular rate and rhythm, normal S1 S2, no S3 or S4, no murmur, click or rub, no peripheral edema  ABDOMEN: soft, nontender, no hepatosplenomegaly, no masses and bowel sounds normal  MS: no gross musculoskeletal defects noted, no edema  SKIN: Scaly scabbed lesion on the left side of his nose approximately 2-1/2 cm x 1 cm  NEURO: Normal strength and tone, mentation intact and speech normal  PSYCH: mentation appears normal, affect normal/bright        10/31/2024   Mini Cog   Mini-Cog Not Completed (choose reason) Patient declines          Patient declines, there are NO concerns for cognitive deficits.           Signed Electronically by: Chandrika Cordova DO    "

## 2024-11-01 ENCOUNTER — TELEPHONE (OUTPATIENT)
Dept: FAMILY MEDICINE | Facility: CLINIC | Age: 89
End: 2024-11-01
Payer: COMMERCIAL

## 2024-11-01 DIAGNOSIS — E87.1 HYPONATREMIA: Primary | ICD-10-CM

## 2024-11-01 LAB
ANION GAP SERPL CALCULATED.3IONS-SCNC: 13 MMOL/L (ref 7–15)
BUN SERPL-MCNC: 24.5 MG/DL (ref 8–23)
CALCIUM SERPL-MCNC: 8.7 MG/DL (ref 8.8–10.4)
CHLORIDE SERPL-SCNC: 94 MMOL/L (ref 98–107)
CREAT SERPL-MCNC: 1.62 MG/DL (ref 0.67–1.17)
CREAT UR-MCNC: 48.5 MG/DL
EGFRCR SERPLBLD CKD-EPI 2021: 39 ML/MIN/1.73M2
GLUCOSE SERPL-MCNC: 108 MG/DL (ref 70–99)
HCO3 SERPL-SCNC: 22 MMOL/L (ref 22–29)
MICROALBUMIN UR-MCNC: <12 MG/L
MICROALBUMIN/CREAT UR: NORMAL MG/G{CREAT}
POTASSIUM SERPL-SCNC: 5.1 MMOL/L (ref 3.4–5.3)
SODIUM SERPL-SCNC: 129 MMOL/L (ref 135–145)

## 2024-11-01 NOTE — TELEPHONE ENCOUNTER
Called and spoke with patient and his daughter.    He is having no symptoms of hyponatremia ais time.    Daughter will have patient add a little more salt to diet this week and he is scheduled for repeat labs 11/6/24.      Reviewed signs of hyponatremia with daughter and she will call if she has any questions or concerns.    Christine M Klisch, RN

## 2024-11-01 NOTE — TELEPHONE ENCOUNTER
Please let the patient know his sodium level is low again.  He is not on any medications that would cause his sodium level to be low.  It is not as low as it was 3 years ago when he had to go to the ER.  Please call and triage him for symptoms of hyponatremia.  Have him increase his salt intake slightly and we can recheck these labs next week.  He is kidney function is also decreased which could be contributing to hyponatremia.    Chandrika Cordova DO

## 2024-11-06 ENCOUNTER — LAB (OUTPATIENT)
Dept: LAB | Facility: CLINIC | Age: 89
End: 2024-11-06
Payer: COMMERCIAL

## 2024-11-06 DIAGNOSIS — Z13.220 SCREENING FOR HYPERLIPIDEMIA: Primary | ICD-10-CM

## 2024-11-06 DIAGNOSIS — E87.1 HYPONATREMIA: ICD-10-CM

## 2024-11-06 PROCEDURE — 36415 COLL VENOUS BLD VENIPUNCTURE: CPT

## 2024-11-06 PROCEDURE — 80061 LIPID PANEL: CPT

## 2024-11-06 PROCEDURE — 80048 BASIC METABOLIC PNL TOTAL CA: CPT

## 2024-11-07 LAB
ANION GAP SERPL CALCULATED.3IONS-SCNC: 12 MMOL/L (ref 7–15)
BUN SERPL-MCNC: 20.3 MG/DL (ref 8–23)
CALCIUM SERPL-MCNC: 8.9 MG/DL (ref 8.8–10.4)
CHLORIDE SERPL-SCNC: 95 MMOL/L (ref 98–107)
CHOLEST SERPL-MCNC: 193 MG/DL
CREAT SERPL-MCNC: 1.49 MG/DL (ref 0.67–1.17)
EGFRCR SERPLBLD CKD-EPI 2021: 43 ML/MIN/1.73M2
FASTING STATUS PATIENT QL REPORTED: YES
FASTING STATUS PATIENT QL REPORTED: YES
GLUCOSE SERPL-MCNC: 84 MG/DL (ref 70–99)
HCO3 SERPL-SCNC: 21 MMOL/L (ref 22–29)
HDLC SERPL-MCNC: 44 MG/DL
LDLC SERPL CALC-MCNC: 108 MG/DL
NONHDLC SERPL-MCNC: 149 MG/DL
POTASSIUM SERPL-SCNC: 4.7 MMOL/L (ref 3.4–5.3)
SODIUM SERPL-SCNC: 128 MMOL/L (ref 135–145)
TRIGL SERPL-MCNC: 205 MG/DL

## 2025-06-12 NOTE — TELEPHONE ENCOUNTER
Medication has been discontinued (*mirtazapine stopped and switched to gabapentin 11/2/21)    Marilu Boyce, RN     No

## (undated) DEVICE — PREP CHLORAPREP W/ORANGE TINT 10.5ML 260715

## (undated) DEVICE — NDL SPINAL 22GA 3.5" QUINCKE 405181

## (undated) DEVICE — TUBING EXTENSION SET MICROBORE 6" LL 2N1194

## (undated) DEVICE — NDL 22GA 1.5"

## (undated) DEVICE — GLOVE PROTEXIS POWDER FREE SMT 6.5  2D72PT65X

## (undated) DEVICE — TRAY PAIN INJECTION 97A 640